# Patient Record
Sex: FEMALE | Race: WHITE | Employment: OTHER | URBAN - METROPOLITAN AREA
[De-identification: names, ages, dates, MRNs, and addresses within clinical notes are randomized per-mention and may not be internally consistent; named-entity substitution may affect disease eponyms.]

---

## 2017-01-23 ENCOUNTER — TELEPHONE (OUTPATIENT)
Dept: INTERNAL MEDICINE CLINIC | Facility: CLINIC | Age: 72
End: 2017-01-23

## 2017-01-25 ENCOUNTER — CHARTING TRANS (OUTPATIENT)
Dept: OTHER | Age: 72
End: 2017-01-25

## 2017-01-25 ASSESSMENT — PAIN SCALES - GENERAL: PAINLEVEL_OUTOF10: 2

## 2017-01-31 ENCOUNTER — OFFICE VISIT (OUTPATIENT)
Dept: INTERNAL MEDICINE CLINIC | Facility: CLINIC | Age: 72
End: 2017-01-31

## 2017-01-31 VITALS
SYSTOLIC BLOOD PRESSURE: 154 MMHG | BODY MASS INDEX: 23 KG/M2 | RESPIRATION RATE: 14 BRPM | TEMPERATURE: 99 F | HEIGHT: 62 IN | HEART RATE: 78 BPM | WEIGHT: 125 LBS | OXYGEN SATURATION: 96 % | DIASTOLIC BLOOD PRESSURE: 90 MMHG

## 2017-01-31 DIAGNOSIS — J31.0 RHINITIS, UNSPECIFIED TYPE: ICD-10-CM

## 2017-01-31 DIAGNOSIS — R03.0 ELEVATED BLOOD PRESSURE, SITUATIONAL: ICD-10-CM

## 2017-01-31 DIAGNOSIS — J06.9 VIRAL UPPER RESPIRATORY INFECTION: Primary | ICD-10-CM

## 2017-01-31 DIAGNOSIS — H69.81 EUSTACHIAN TUBE DYSFUNCTION, RIGHT: ICD-10-CM

## 2017-01-31 PROCEDURE — 99213 OFFICE O/P EST LOW 20 MIN: CPT | Performed by: FAMILY MEDICINE

## 2017-01-31 RX ORDER — PREDNISONE 20 MG/1
20 TABLET ORAL DAILY
Qty: 5 TABLET | Refills: 0 | Status: SHIPPED | OUTPATIENT
Start: 2017-01-31 | End: 2017-02-05

## 2017-01-31 NOTE — PROGRESS NOTES
CC:  URI      Hx of CC:  10 + DAYS WITH COLD SYMPTOMS. MOSTLY NASAL AND RIGHT EAR CONGESTION AT THIS TIME, INTERMITTENT FRONTAL HEADACHE WHICH RESOLVES WITH DECONGESTANT (ON [facility]). MILD RESIDUAL COUGH.     PATIENT HAS H/O SITUATIONAL

## 2017-02-06 ENCOUNTER — OFFICE VISIT (OUTPATIENT)
Dept: INTERNAL MEDICINE CLINIC | Facility: CLINIC | Age: 72
End: 2017-02-06

## 2017-02-06 VITALS
RESPIRATION RATE: 16 BRPM | BODY MASS INDEX: 22.32 KG/M2 | HEART RATE: 73 BPM | SYSTOLIC BLOOD PRESSURE: 150 MMHG | WEIGHT: 126 LBS | HEIGHT: 63 IN | DIASTOLIC BLOOD PRESSURE: 82 MMHG | TEMPERATURE: 98 F | OXYGEN SATURATION: 99 %

## 2017-02-06 DIAGNOSIS — J01.00 SUBACUTE MAXILLARY SINUSITIS: Primary | ICD-10-CM

## 2017-02-06 PROCEDURE — 99213 OFFICE O/P EST LOW 20 MIN: CPT | Performed by: INTERNAL MEDICINE

## 2017-02-06 RX ORDER — AMOXICILLIN 500 MG/1
500 CAPSULE ORAL 3 TIMES DAILY
Qty: 30 CAPSULE | Refills: 0 | Status: SHIPPED | OUTPATIENT
Start: 2017-02-06 | End: 2017-02-16

## 2017-02-06 NOTE — PROGRESS NOTES
HPI:    Patient ID: Ines Jaimes is a 80year old female. HPIpt here for fu on URI. Finished up a small course of prednisone. Mucinex  Was helpful. Review of Systems   HENT: Positive for ear pain, hearing loss and postnasal drip.     Respiratory:

## 2017-03-15 ENCOUNTER — TELEPHONE (OUTPATIENT)
Dept: INTERNAL MEDICINE CLINIC | Facility: CLINIC | Age: 72
End: 2017-03-15

## 2017-03-31 ENCOUNTER — OFFICE VISIT (OUTPATIENT)
Dept: INTERNAL MEDICINE CLINIC | Facility: CLINIC | Age: 72
End: 2017-03-31

## 2017-03-31 VITALS
TEMPERATURE: 98 F | RESPIRATION RATE: 17 BRPM | WEIGHT: 126.81 LBS | BODY MASS INDEX: 22.47 KG/M2 | HEART RATE: 83 BPM | OXYGEN SATURATION: 98 % | DIASTOLIC BLOOD PRESSURE: 68 MMHG | SYSTOLIC BLOOD PRESSURE: 122 MMHG | HEIGHT: 63 IN

## 2017-03-31 DIAGNOSIS — Z00.00 MEDICARE ANNUAL WELLNESS VISIT, SUBSEQUENT: Primary | ICD-10-CM

## 2017-03-31 PROCEDURE — 96160 PT-FOCUSED HLTH RISK ASSMT: CPT | Performed by: INTERNAL MEDICINE

## 2017-03-31 PROCEDURE — G0439 PPPS, SUBSEQ VISIT: HCPCS | Performed by: INTERNAL MEDICINE

## 2017-03-31 RX ORDER — MONTELUKAST SODIUM 10 MG/1
10 TABLET ORAL NIGHTLY
Qty: 30 TABLET | Refills: 1 | Status: SHIPPED | OUTPATIENT
Start: 2017-03-31 | End: 2018-04-11 | Stop reason: ALTCHOICE

## 2017-03-31 NOTE — PROGRESS NOTES
HPI:    Patient ID: Tameka Gross is a 80year old female. HPIPt here for a MA supervisit. Only complaints are of runny nose and congestion. Bp is well controlled off of all rx.     Review of Systems           Current Outpatient Prescriptions:  aspir without help    Managing money/bills: Able without help    Taking medications as prescribed: Able without help    Are you able to afford your medications?: Yes    Hearing Problems?: No     Functional Status     Hearing Problems?: No    Vision Problems? : N (hypertension)    • Allergic rhinitis    • Sinusitis    • Cough due to ACE inhibitor    • Seasonal allergies    • Osteopenia    • Vitamin D deficiency    • Hyperlipidemia    • Insomnia       No past surgical history on file.    Family History   Problem Rela no apparent distress  SKIN: no rashes, no suspicious lesions  HEENT: atraumatic, normocephalic, ears and throat are clear     EYES: PERRLA, EOMI, conjunctiva are clear  Right Eye Visual Acuity:  (pt declined screening)           NECK: supple, no adenopathy flowsheet data found. Glaucoma Screening      Ophthalmology Visit Annually     Bone Density Screening      Dexascan Every two years No results found for this or any previous visit. No flowsheet data found.     Pap and Pelvic      Pap  Annually if high ri No flowsheet data found. Dilated Eye exam  Annually No flowsheet data found. No flowsheet data found. COPD      Spirometry Testing Annually No results found for this or any previous visit. No flowsheet data found.       SCREENING SCHEDULE - FEMAL

## 2017-04-01 ENCOUNTER — NURSE ONLY (OUTPATIENT)
Dept: INTERNAL MEDICINE CLINIC | Facility: CLINIC | Age: 72
End: 2017-04-01

## 2017-04-01 DIAGNOSIS — Z00.00 MEDICARE ANNUAL WELLNESS VISIT, SUBSEQUENT: ICD-10-CM

## 2017-04-01 PROCEDURE — 84443 ASSAY THYROID STIM HORMONE: CPT | Performed by: INTERNAL MEDICINE

## 2017-04-01 PROCEDURE — 80061 LIPID PANEL: CPT | Performed by: INTERNAL MEDICINE

## 2017-04-01 PROCEDURE — 36415 COLL VENOUS BLD VENIPUNCTURE: CPT | Performed by: INTERNAL MEDICINE

## 2017-04-01 PROCEDURE — 85027 COMPLETE CBC AUTOMATED: CPT | Performed by: INTERNAL MEDICINE

## 2017-04-01 PROCEDURE — 80053 COMPREHEN METABOLIC PANEL: CPT | Performed by: INTERNAL MEDICINE

## 2017-04-01 NOTE — PROGRESS NOTES
Pt presented to clinic today for blood draw. Per physician able to draw orders. Orders  documented within chart. Pt tolerated lab draw well.  verified.   Orders drawn include: CBC, CMP, lipid, TSH  Site of draw: right arm  JATIN Ward

## 2017-12-20 ENCOUNTER — OFFICE VISIT (OUTPATIENT)
Dept: INTERNAL MEDICINE CLINIC | Facility: CLINIC | Age: 72
End: 2017-12-20

## 2017-12-20 VITALS
OXYGEN SATURATION: 100 % | RESPIRATION RATE: 17 BRPM | HEART RATE: 68 BPM | SYSTOLIC BLOOD PRESSURE: 118 MMHG | TEMPERATURE: 98 F | WEIGHT: 125 LBS | DIASTOLIC BLOOD PRESSURE: 76 MMHG | BODY MASS INDEX: 23.6 KG/M2 | HEIGHT: 61 IN

## 2017-12-20 DIAGNOSIS — Z23 NEEDS FLU SHOT: ICD-10-CM

## 2017-12-20 DIAGNOSIS — J01.10 SUBACUTE FRONTAL SINUSITIS: Primary | ICD-10-CM

## 2017-12-20 DIAGNOSIS — Z23 NEED FOR VACCINATION FOR STREP PNEUMONIAE: ICD-10-CM

## 2017-12-20 DIAGNOSIS — J31.0 OTHER CHRONIC RHINITIS: ICD-10-CM

## 2017-12-20 PROCEDURE — 90732 PPSV23 VACC 2 YRS+ SUBQ/IM: CPT | Performed by: INTERNAL MEDICINE

## 2017-12-20 PROCEDURE — G0008 ADMIN INFLUENZA VIRUS VAC: HCPCS | Performed by: INTERNAL MEDICINE

## 2017-12-20 PROCEDURE — G0009 ADMIN PNEUMOCOCCAL VACCINE: HCPCS | Performed by: INTERNAL MEDICINE

## 2017-12-20 PROCEDURE — 99213 OFFICE O/P EST LOW 20 MIN: CPT | Performed by: INTERNAL MEDICINE

## 2017-12-20 PROCEDURE — 90653 IIV ADJUVANT VACCINE IM: CPT | Performed by: INTERNAL MEDICINE

## 2017-12-20 RX ORDER — FLUTICASONE PROPIONATE 50 MCG
2 SPRAY, SUSPENSION (ML) NASAL DAILY
Qty: 3 BOTTLE | Refills: 3 | Status: SHIPPED | OUTPATIENT
Start: 2017-12-20 | End: 2018-04-11 | Stop reason: ALTCHOICE

## 2017-12-20 RX ORDER — AMOXICILLIN AND CLAVULANATE POTASSIUM 875; 125 MG/1; MG/1
1 TABLET, FILM COATED ORAL 2 TIMES DAILY
Qty: 14 TABLET | Refills: 0 | Status: SHIPPED | OUTPATIENT
Start: 2017-12-20 | End: 2017-12-30

## 2017-12-20 NOTE — PROGRESS NOTES
HPI:    Patient ID: Alfred Michelle is a 80year old female. HPI     Chronic sinusitis, worsen with weather, winter, fall, spring. No allergy. Nose bleed at times. Has postnasal dri and uncontrollable rhinorrhea. . Tried vaseline topically.  Symptoms to light. No scleral icterus. Neck: Normal range of motion. No thyromegaly present. Cardiovascular: Normal rate, regular rhythm, normal heart sounds and intact distal pulses.     Pulmonary/Chest: Effort normal and breath sounds normal.   Abdominal: Soft

## 2017-12-20 NOTE — PROGRESS NOTES
Vaccination administered in left arm IM  Patient tolerated well no reaction at this time, no blood at injection site band aid applied.   Vaccine given:  PCV-23  Orders per Doctor Co    Flu shot administered in left arm IM  Patient denies allergy to eggs, fl

## 2018-01-16 ENCOUNTER — HOSPITAL ENCOUNTER (EMERGENCY)
Facility: HOSPITAL | Age: 73
Discharge: HOME | End: 2018-01-16
Attending: EMERGENCY MEDICINE
Payer: MEDICARE

## 2018-01-16 VITALS
TEMPERATURE: 98 F | DIASTOLIC BLOOD PRESSURE: 88 MMHG | HEART RATE: 81 BPM | HEIGHT: 61 IN | OXYGEN SATURATION: 97 % | RESPIRATION RATE: 18 BRPM | SYSTOLIC BLOOD PRESSURE: 183 MMHG | BODY MASS INDEX: 23.6 KG/M2 | WEIGHT: 125 LBS
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
## 2018-01-16 DIAGNOSIS — S61.451A DOG BITE OF RIGHT HAND, INITIAL ENCOUNTER: Primary | ICD-10-CM

## 2018-01-16 PROCEDURE — 99283 EMERGENCY DEPT VISIT LOW MDM: CPT

## 2018-01-16 PROCEDURE — 90471 IMMUNIZATION ADMIN: CPT

## 2018-01-16 RX ORDER — AMOXICILLIN AND CLAVULANATE POTASSIUM 875; 125 MG/1; MG/1
1 TABLET, FILM COATED ORAL 2 TIMES DAILY
Qty: 14 TABLET | Refills: 0 | Status: SHIPPED | OUTPATIENT
Start: 2018-01-16 | End: 2018-01-19

## 2018-01-16 NOTE — ED INITIAL ASSESSMENT (HPI)
Pt came in for dog bite to right hand. Pt knows the dog, up to date on immunizations per pt. Last tetanus shot unknown. CMS intact, radial pulse palpable, able to complete ROM.  RR jay and nonlabored, speaking in full sentences, ambulatory with steady gait

## 2018-01-17 NOTE — ED PROVIDER NOTES
Patient Seen in: [facility] AND [facility] Emergency Department    History   Patient presents with:  Bite (integumentary)      HPI    Patient presents after being bit on her right hand by her neighbor's dog. This occurred 1 hour ago.   Patient states the dog is stairs. ROS  Pertinent Positives: Dog bite  All other organ systems are reviewed and are negative. Constitutional and vital signs reviewed.       Social History and Family History elements reviewed from today, pertinent positives to the presenting tears    Medical Record Review: I personally reviewed available prior medical records for any recent pertinent discharge summaries, testing, and procedures and reviewed those reports. Complicating Factors:  The patient already has has White matter diseas

## 2018-01-17 NOTE — ED NOTES
DOG BITE BY HER FRIEND'S DOG AT 2PM TO THE RIGHT WRIST, + PUNCTURE WOUND WITH MILD AMOUNT OF BLEEDING. WOUND CARE DONE BY PATIENT.  PATIENT FILLING OUT DOG BITE PAPERWORK, AWAITS MD EXAM

## 2018-01-19 ENCOUNTER — OFFICE VISIT (OUTPATIENT)
Dept: INTERNAL MEDICINE CLINIC | Facility: CLINIC | Age: 73
End: 2018-01-19

## 2018-01-19 VITALS
OXYGEN SATURATION: 99 % | RESPIRATION RATE: 17 BRPM | SYSTOLIC BLOOD PRESSURE: 128 MMHG | TEMPERATURE: 98 F | WEIGHT: 126 LBS | HEIGHT: 61 IN | DIASTOLIC BLOOD PRESSURE: 70 MMHG | HEART RATE: 74 BPM | BODY MASS INDEX: 23.79 KG/M2

## 2018-01-19 DIAGNOSIS — L03.113 CELLULITIS OF RIGHT HAND: ICD-10-CM
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
## 2018-01-19 PROBLEM — H40.013 OPEN ANGLE WITH BORDERLINE FINDINGS, LOW RISK, BILATERAL: Status: ACTIVE | Noted: 2017-09-29

## 2018-01-19 PROCEDURE — 99213 OFFICE O/P EST LOW 20 MIN: CPT | Performed by: INTERNAL MEDICINE

## 2018-01-19 PROCEDURE — 87205 SMEAR GRAM STAIN: CPT | Performed by: INTERNAL MEDICINE

## 2018-01-19 PROCEDURE — 87070 CULTURE OTHR SPECIMN AEROBIC: CPT | Performed by: INTERNAL MEDICINE

## 2018-01-19 RX ORDER — AMOXICILLIN AND CLAVULANATE POTASSIUM 875; 125 MG/1; MG/1
1 TABLET, FILM COATED ORAL 2 TIMES DAILY
Qty: 14 TABLET | Refills: 0 | Status: SHIPPED | OUTPATIENT
Start: 2018-01-19 | End: 2018-01-26

## 2018-01-19 NOTE — PATIENT INSTRUCTIONS
Dog Bite  A dog bite can cause a wound deep enough to break the skin. In such cases, the wound is cleaned and sometimes closed. If the wound is closed, it is usually not completely closed.  This is so that fluid can drain if the wound becomes infected. Of · If someone’s pet dog has bitten you, it should be kept in a secure area for the next 10 days to watch for signs of illness.  (If the pet owner won’t allow this, contact your local animal control center.) If the dog becomes ill or dies during that time, co Cellulitis is an infection of the deep layers of skin. A break in the skin, such as a cut or scratch, can let bacteria under the skin. If the bacteria get to deep layers of the skin, it can be serious.  If not treated, cellulitis can get into the bloodstrea Date Last Reviewed: 9/1/2016  © 2587-8378 [employer] [address]. [address] [facility], [address] [place]. All rights reserved. This information is not intended as a substitute for professional medical care.  Always follow your healthcare professional'

## 2018-01-19 NOTE — PROGRESS NOTES
HPI:    Patient ID: Jennifer Bowden is a 71year old female. HPI     R hand was bitten  by 's dog 3 days ago. Dog have complete immunization including Rabies. Seen at ED and received Tdap. Prescription of augmentin was given.  Patient had

## 2018-01-23 ENCOUNTER — OFFICE VISIT (OUTPATIENT)
Dept: OTOLARYNGOLOGY | Facility: CLINIC | Age: 73
End: 2018-01-23

## 2018-01-23 VITALS
SYSTOLIC BLOOD PRESSURE: 167 MMHG | BODY MASS INDEX: 23.79 KG/M2 | WEIGHT: 126 LBS | DIASTOLIC BLOOD PRESSURE: 87 MMHG | HEIGHT: 61 IN

## 2018-01-23 DIAGNOSIS — J32.9 CHRONIC SINUSITIS, UNSPECIFIED LOCATION: Primary | ICD-10-CM

## 2018-01-23 PROCEDURE — G0463 HOSPITAL OUTPT CLINIC VISIT: HCPCS | Performed by: OTOLARYNGOLOGY

## 2018-01-23 PROCEDURE — 99203 OFFICE O/P NEW LOW 30 MIN: CPT | Performed by: OTOLARYNGOLOGY

## 2018-01-23 NOTE — PROGRESS NOTES
Alric  is a 71year old female.   Patient presents with:  Sinus Problem: nasal congestion and clear drainage from both nostrils, and post nasal drip for several years       HISTORY OF PRESENT ILLNESS  13 year history of chronic nasal congestion and • Allergic rhinitis    • Bronchitis    • Cough due to ACE inhibitor    • HTN (hypertension)    • Hyperlipidemia    • Insomnia    • Osteopenia    • Seasonal allergies    • Sinusitis    • Vitamin D deficiency        History reviewed.  No pertinent surgical Submandibular. Anterior cervical. Posterior cervical. Supraclavicular.         Nose/Mouth/Throat Normal External nose - Normal. Lips/teeth/gums - Normal. Tonsils - Normal. Oropharynx - Normal.   Nose/Mouth/Throat Normal Nares - Right: Normal Left: Normal. S

## 2018-01-30 ENCOUNTER — HOSPITAL ENCOUNTER (OUTPATIENT)
Dept: CT IMAGING | Facility: HOSPITAL | Age: 73
Discharge: HOME | End: 2018-01-30
Attending: OTOLARYNGOLOGY
Payer: MEDICARE

## 2018-01-30 DIAGNOSIS — J32.9 CHRONIC SINUSITIS, UNSPECIFIED LOCATION: ICD-10-CM

## 2018-01-30 PROCEDURE — 70486 CT MAXILLOFACIAL W/O DYE: CPT | Performed by: OTOLARYNGOLOGY

## 2018-02-02 ENCOUNTER — TELEPHONE (OUTPATIENT)
Dept: INTERNAL MEDICINE CLINIC | Facility: CLINIC | Age: 73
End: 2018-02-02

## 2018-02-02 DIAGNOSIS — Z09 SURGICAL FOLLOWUP: Primary | ICD-10-CM

## 2018-02-08 ENCOUNTER — OFFICE VISIT (OUTPATIENT)
Dept: OTOLARYNGOLOGY | Facility: CLINIC | Age: 73
End: 2018-02-08

## 2018-02-08 VITALS
HEIGHT: 61 IN | DIASTOLIC BLOOD PRESSURE: 80 MMHG | TEMPERATURE: 99 F | SYSTOLIC BLOOD PRESSURE: 160 MMHG | BODY MASS INDEX: 23.79 KG/M2 | WEIGHT: 126 LBS

## 2018-02-08 DIAGNOSIS — J32.9 CHRONIC SINUSITIS, UNSPECIFIED LOCATION: Primary | ICD-10-CM

## 2018-02-08 PROCEDURE — 99214 OFFICE O/P EST MOD 30 MIN: CPT | Performed by: OTOLARYNGOLOGY

## 2018-02-08 PROCEDURE — G0463 HOSPITAL OUTPT CLINIC VISIT: HCPCS | Performed by: OTOLARYNGOLOGY

## 2018-02-08 RX ORDER — METHSCOPOLAMINE BROMIDE 2.5 MG/1
2.5 TABLET ORAL 2 TIMES DAILY
Qty: 60 TABLET | Refills: 3 | Status: SHIPPED | OUTPATIENT
Start: 2018-02-08 | End: 2018-04-11

## 2018-02-19 NOTE — PROGRESS NOTES
David Corey is a 71year old female. Patient presents with: Follow - Up: CT scan result done on 1/30/18      HISTORY OF PRESENT ILLNESS  13 year history of chronic nasal congestion and clear drainage from both nostrils.   Chronic postnasal drip for se Problem Relation Age of Onset   • Heart Attack Father    • Diabetes Father    • Stroke Mother 80   • Arthritis Mother    • High Blood Pressure Mother    • Musculo-skelatal Disorder Mother      osteoporosis   • Cancer Brother        Past Medical History: Oropharynx - Normal.   Ears Normal Inspection - Right: Normal, Left: Normal. Canal - Right: Normal, Left: Normal. TM - Right: Normal, Left: Normal.   Skin Normal Inspection - Normal.        Lymph Detail Normal Submental. Submandibular.  Anterior cervical. P

## 2018-02-21 ENCOUNTER — TELEPHONE (OUTPATIENT)
Dept: INTERNAL MEDICINE CLINIC | Facility: CLINIC | Age: 73
End: 2018-02-21

## 2018-04-11 ENCOUNTER — OFFICE VISIT (OUTPATIENT)
Dept: INTERNAL MEDICINE CLINIC | Facility: CLINIC | Age: 73
End: 2018-04-11

## 2018-04-11 VITALS
DIASTOLIC BLOOD PRESSURE: 66 MMHG | HEART RATE: 71 BPM | HEIGHT: 61 IN | RESPIRATION RATE: 18 BRPM | TEMPERATURE: 99 F | WEIGHT: 126 LBS | BODY MASS INDEX: 23.79 KG/M2 | SYSTOLIC BLOOD PRESSURE: 120 MMHG | OXYGEN SATURATION: 98 %

## 2018-04-11 DIAGNOSIS — J30.0 VASOMOTOR RHINITIS: ICD-10-CM

## 2018-04-11 DIAGNOSIS — Z00.00 MEDICARE ANNUAL WELLNESS VISIT, SUBSEQUENT: Primary | ICD-10-CM

## 2018-04-11 DIAGNOSIS — M85.80 OSTEOPENIA, UNSPECIFIED LOCATION: ICD-10-CM

## 2018-04-11 DIAGNOSIS — Z12.31 SCREENING MAMMOGRAM, ENCOUNTER FOR: ICD-10-CM

## 2018-04-11 DIAGNOSIS — J30.2 SEASONAL ALLERGIC RHINITIS, UNSPECIFIED TRIGGER: ICD-10-CM

## 2018-04-11 PROCEDURE — G0439 PPPS, SUBSEQ VISIT: HCPCS | Performed by: INTERNAL MEDICINE

## 2018-04-11 PROCEDURE — 96160 PT-FOCUSED HLTH RISK ASSMT: CPT | Performed by: INTERNAL MEDICINE

## 2018-04-11 NOTE — PROGRESS NOTES
HPI:    Patient ID: Karthik Aden is a 71year old female. Patient here for a MA supervisit and fu on chronic runny nose and isolated elevated bp in the past.  Saw ent and was prescribed methoscpalamine for the vasomotor rhinitis.     HPI:   Sammie Sultana WEEKS   Little interest or pleasure in doing things (over the last two weeks)?: Not at all    Feeling down, depressed, or hopeless (over the last two weeks)?: Not at all    PHQ-2 SCORE: 0        Advance Directives     Do you have a healthcare power of atto vision or double vision  HEENT: denies nasal congestion, sinus pain or ST  Chronic rhinitis  LUNGS: denies shortness of breath with exertion  CARDIOVASCULAR: denies chest pain on exertion  GI: denies abdominal pain, denies heartburn  : denies dysuria, va old female who presents for a Medicare Assessment.      PLAN SUMMARY:   Diagnoses and all orders for this visit:    Medicare annual wellness visit, subsequent    Vasomotor rhinitis    Screening mammogram, encounter for  -     [facility] SCREENING BILAT (CPT=77067) Update Health Maintenance if applicable   Immunizations      Influenza No orders found for this or any previous visit.  Update Immunization Activity if applicable    Pneumococcal   Orders placed or performed in visit on 12/20/17  -PNEUMOCOCCAL IMM (Chun [place] (H)      Colonoscopy All Patients q10 years Colonoscopy,10 Years due on 12/29/1996   FBS All Patients q1 year No results found for: GLUCOSE   Glaucoma If at high risk q1 year    Pap If at high risk q1 year There are no preventive care reminders to display unspecified location  dexa scan    No orders of the defined types were placed in this encounter.       Meds This Visit:  No prescriptions requested or ordered in this encounter    Imaging & Referrals:  [facility] SCREENING BILAT (CPT=77067)  XR DEXA BONE DENSITOME

## 2018-04-27 ENCOUNTER — HOSPITAL ENCOUNTER (OUTPATIENT)
Dept: MAMMOGRAPHY | Age: 73
Discharge: HOME | End: 2018-04-27
Attending: INTERNAL MEDICINE
Payer: MEDICARE

## 2018-04-27 DIAGNOSIS — Z12.31 SCREENING MAMMOGRAM, ENCOUNTER FOR: ICD-10-CM

## 2018-04-27 PROCEDURE — 77067 SCR MAMMO BI INCL CAD: CPT | Performed by: INTERNAL MEDICINE

## 2018-04-30 ENCOUNTER — LAB ENCOUNTER (OUTPATIENT)
Dept: LAB | Facility: HOSPITAL | Age: 73
End: 2018-04-30
Attending: INTERNAL MEDICINE
Payer: MEDICARE

## 2018-04-30 DIAGNOSIS — Z00.00 MEDICARE ANNUAL WELLNESS VISIT, SUBSEQUENT: ICD-10-CM

## 2018-04-30 PROCEDURE — 36415 COLL VENOUS BLD VENIPUNCTURE: CPT

## 2018-04-30 PROCEDURE — 82306 VITAMIN D 25 HYDROXY: CPT

## 2018-04-30 PROCEDURE — 80061 LIPID PANEL: CPT

## 2018-04-30 PROCEDURE — 85025 COMPLETE CBC W/AUTO DIFF WBC: CPT

## 2018-04-30 PROCEDURE — 80053 COMPREHEN METABOLIC PANEL: CPT

## 2018-05-02 ENCOUNTER — TELEPHONE (OUTPATIENT)
Dept: INTERNAL MEDICINE CLINIC | Facility: CLINIC | Age: 73
End: 2018-05-02

## 2018-06-13 ENCOUNTER — OFFICE VISIT (OUTPATIENT)
Dept: INTERNAL MEDICINE CLINIC | Facility: CLINIC | Age: 73
End: 2018-06-13

## 2018-06-13 VITALS
HEART RATE: 78 BPM | SYSTOLIC BLOOD PRESSURE: 140 MMHG | TEMPERATURE: 98 F | HEIGHT: 61 IN | DIASTOLIC BLOOD PRESSURE: 82 MMHG | RESPIRATION RATE: 17 BRPM | OXYGEN SATURATION: 98 % | WEIGHT: 126 LBS | BODY MASS INDEX: 23.79 KG/M2

## 2018-06-13 DIAGNOSIS — I10 ESSENTIAL HYPERTENSION: Primary | ICD-10-CM

## 2018-06-13 DIAGNOSIS — L30.9 ECZEMA, UNSPECIFIED TYPE: ICD-10-CM

## 2018-06-13 PROCEDURE — 99213 OFFICE O/P EST LOW 20 MIN: CPT | Performed by: INTERNAL MEDICINE

## 2018-06-13 RX ORDER — MOMETASONE FUROATE 1 MG/G
CREAM TOPICAL
Qty: 15 G | Refills: 3 | Status: SHIPPED | OUTPATIENT
Start: 2018-06-13 | End: 2019-05-15

## 2018-06-13 RX ORDER — HYDROCHLOROTHIAZIDE 25 MG/1
25 TABLET ORAL DAILY
Qty: 90 TABLET | Refills: 3 | Status: SHIPPED | OUTPATIENT
Start: 2018-06-13 | End: 2019-05-15

## 2018-06-13 NOTE — PROGRESS NOTES
HPI:    Patient ID: Sindy Dempsey is a 71year old female. Pt here for eval of some elevated bp readings at home kelli in the pms and evenings. Has had hx of htn in past treated with ACe. Has been prone to some fluid retention in the ankles.     Review

## 2018-11-06 VITALS
WEIGHT: 122 LBS | BODY MASS INDEX: 21.62 KG/M2 | HEART RATE: 88 BPM | RESPIRATION RATE: 16 BRPM | SYSTOLIC BLOOD PRESSURE: 150 MMHG | DIASTOLIC BLOOD PRESSURE: 80 MMHG | TEMPERATURE: 98.9 F | HEIGHT: 63 IN

## 2018-11-14 ENCOUNTER — OFFICE VISIT (OUTPATIENT)
Dept: INTERNAL MEDICINE CLINIC | Facility: CLINIC | Age: 73
End: 2018-11-14

## 2018-11-14 VITALS
OXYGEN SATURATION: 98 % | WEIGHT: 123 LBS | RESPIRATION RATE: 17 BRPM | SYSTOLIC BLOOD PRESSURE: 160 MMHG | TEMPERATURE: 99 F | BODY MASS INDEX: 23 KG/M2 | DIASTOLIC BLOOD PRESSURE: 86 MMHG | HEART RATE: 86 BPM

## 2018-11-14 DIAGNOSIS — I10 ESSENTIAL HYPERTENSION: Primary | ICD-10-CM

## 2018-11-14 DIAGNOSIS — J30.2 SEASONAL ALLERGIC RHINITIS, UNSPECIFIED TRIGGER: ICD-10-CM

## 2018-11-14 PROCEDURE — G0008 ADMIN INFLUENZA VIRUS VAC: HCPCS | Performed by: INTERNAL MEDICINE

## 2018-11-14 PROCEDURE — 90653 IIV ADJUVANT VACCINE IM: CPT | Performed by: INTERNAL MEDICINE

## 2018-11-14 PROCEDURE — 99214 OFFICE O/P EST MOD 30 MIN: CPT | Performed by: INTERNAL MEDICINE

## 2018-11-14 NOTE — PROGRESS NOTES
HPI:    Patient ID: Steve Browning is a 71year old female. Pt here for a fu on htn - claims to be getting lower back pains related to intake of Hctz . Continues to have nasal mucous problems. Has carpal tu hand. nnel symptoms in right     Review of S regular rhythm and normal heart sounds. Pulmonary/Chest: Effort normal and breath sounds normal. She has no wheezes. She has no rales. Abdominal: Soft. Bowel sounds are normal. There is no tenderness. Musculoskeletal: Normal range of motion.    Lymph

## 2019-03-18 ENCOUNTER — OFFICE VISIT (OUTPATIENT)
Dept: INTERNAL MEDICINE CLINIC | Facility: CLINIC | Age: 74
End: 2019-03-18
Payer: COMMERCIAL

## 2019-03-18 VITALS
TEMPERATURE: 98 F | WEIGHT: 124 LBS | RESPIRATION RATE: 20 BRPM | HEIGHT: 61 IN | DIASTOLIC BLOOD PRESSURE: 70 MMHG | OXYGEN SATURATION: 98 % | SYSTOLIC BLOOD PRESSURE: 126 MMHG | BODY MASS INDEX: 23.41 KG/M2 | HEART RATE: 76 BPM

## 2019-03-18 DIAGNOSIS — I10 ESSENTIAL HYPERTENSION: ICD-10-CM

## 2019-03-18 DIAGNOSIS — J01.10 ACUTE NON-RECURRENT FRONTAL SINUSITIS: Primary | ICD-10-CM

## 2019-03-18 PROCEDURE — 99213 OFFICE O/P EST LOW 20 MIN: CPT | Performed by: INTERNAL MEDICINE

## 2019-03-18 RX ORDER — AMOXICILLIN AND CLAVULANATE POTASSIUM 875; 125 MG/1; MG/1
1 TABLET, FILM COATED ORAL 2 TIMES DAILY
Qty: 14 TABLET | Refills: 0 | Status: SHIPPED | OUTPATIENT
Start: 2019-03-18 | End: 2019-03-27 | Stop reason: ALTCHOICE

## 2019-03-18 RX ORDER — FLUCONAZOLE 150 MG/1
150 TABLET ORAL ONCE
Qty: 1 TABLET | Refills: 0 | Status: SHIPPED | OUTPATIENT
Start: 2019-03-18 | End: 2019-03-18

## 2019-03-18 RX ORDER — GUAIFENESIN 600 MG
600 TABLET, EXTENDED RELEASE 12 HR ORAL 2 TIMES DAILY
Qty: 18 TABLET | Refills: 0 | Status: SHIPPED | OUTPATIENT
Start: 2019-03-18 | End: 2019-03-27 | Stop reason: ALTCHOICE

## 2019-03-18 RX ORDER — AMOXICILLIN AND CLAVULANATE POTASSIUM 875; 125 MG/1; MG/1
1 TABLET, FILM COATED ORAL 2 TIMES DAILY
Qty: 14 TABLET | Refills: 0 | Status: SHIPPED | OUTPATIENT
Start: 2019-03-18 | End: 2019-03-18

## 2019-03-18 RX ORDER — GUAIFENESIN 600 MG
600 TABLET, EXTENDED RELEASE 12 HR ORAL 2 TIMES DAILY
Qty: 18 TABLET | Refills: 0 | Status: SHIPPED | OUTPATIENT
Start: 2019-03-18 | End: 2019-03-18

## 2019-03-18 NOTE — PROGRESS NOTES
HPI:    Patient ID: David Corey is a 68year old female. Sinusitis   This is a new problem. The current episode started in the past 7 days. The problem has been gradually worsening since onset. There has been no fever. The pain is moderate.  Associat Oropharynx is clear and moist.   Swelling of nasal turbinates with yellow  Thick mucus. Post nasal drip. Frontal sinus tenderness   Eyes: Conjunctivae and EOM are normal. Pupils are equal, round, and reactive to light. No scleral icterus.    Neck: Normal ran

## 2019-03-27 ENCOUNTER — OFFICE VISIT (OUTPATIENT)
Dept: INTERNAL MEDICINE CLINIC | Facility: CLINIC | Age: 74
End: 2019-03-27
Payer: COMMERCIAL

## 2019-03-27 VITALS
BODY MASS INDEX: 23.79 KG/M2 | OXYGEN SATURATION: 98 % | WEIGHT: 126 LBS | DIASTOLIC BLOOD PRESSURE: 80 MMHG | HEIGHT: 61 IN | HEART RATE: 82 BPM | SYSTOLIC BLOOD PRESSURE: 134 MMHG

## 2019-03-27 DIAGNOSIS — J01.10 ACUTE NON-RECURRENT FRONTAL SINUSITIS: Primary | ICD-10-CM

## 2019-03-27 PROCEDURE — 99213 OFFICE O/P EST LOW 20 MIN: CPT | Performed by: INTERNAL MEDICINE

## 2019-03-27 RX ORDER — METHYLPREDNISOLONE 4 MG/1
TABLET ORAL
Qty: 1 KIT | Refills: 0 | Status: SHIPPED | OUTPATIENT
Start: 2019-03-27 | End: 2019-05-15 | Stop reason: ALTCHOICE

## 2019-03-27 NOTE — PROGRESS NOTES
HPI:    Patient ID: Slime Reese is a 68year old female. Pt here for fu on sinus congestion and thick post nasal drip- took 7 days of augmentin -finished 2 days ago. Has partially improved but does not feel all the way cured.  Has been doing sinus ri

## 2019-05-15 ENCOUNTER — OFFICE VISIT (OUTPATIENT)
Dept: INTERNAL MEDICINE CLINIC | Facility: CLINIC | Age: 74
End: 2019-05-15
Payer: MEDICARE

## 2019-05-15 VITALS
WEIGHT: 123.63 LBS | OXYGEN SATURATION: 99 % | SYSTOLIC BLOOD PRESSURE: 138 MMHG | HEIGHT: 61 IN | HEART RATE: 95 BPM | DIASTOLIC BLOOD PRESSURE: 78 MMHG | BODY MASS INDEX: 23.34 KG/M2

## 2019-05-15 DIAGNOSIS — Z00.00 MEDICARE ANNUAL WELLNESS VISIT, SUBSEQUENT: Primary | ICD-10-CM

## 2019-05-15 DIAGNOSIS — Z12.31 SCREENING MAMMOGRAM, ENCOUNTER FOR: ICD-10-CM

## 2019-05-15 DIAGNOSIS — J30.2 SEASONAL ALLERGIC RHINITIS, UNSPECIFIED TRIGGER: ICD-10-CM

## 2019-05-15 DIAGNOSIS — M54.12 CERVICAL RADICULOPATHY: ICD-10-CM

## 2019-05-15 DIAGNOSIS — I10 ESSENTIAL HYPERTENSION: ICD-10-CM

## 2019-05-15 PROCEDURE — 80053 COMPREHEN METABOLIC PANEL: CPT | Performed by: INTERNAL MEDICINE

## 2019-05-15 PROCEDURE — 80061 LIPID PANEL: CPT | Performed by: INTERNAL MEDICINE

## 2019-05-15 PROCEDURE — 84443 ASSAY THYROID STIM HORMONE: CPT | Performed by: INTERNAL MEDICINE

## 2019-05-15 PROCEDURE — 96160 PT-FOCUSED HLTH RISK ASSMT: CPT | Performed by: INTERNAL MEDICINE

## 2019-05-15 PROCEDURE — 84439 ASSAY OF FREE THYROXINE: CPT | Performed by: INTERNAL MEDICINE

## 2019-05-15 PROCEDURE — G0439 PPPS, SUBSEQ VISIT: HCPCS | Performed by: INTERNAL MEDICINE

## 2019-05-15 PROCEDURE — 99397 PER PM REEVAL EST PAT 65+ YR: CPT | Performed by: INTERNAL MEDICINE

## 2019-05-15 PROCEDURE — 85025 COMPLETE CBC W/AUTO DIFF WBC: CPT | Performed by: INTERNAL MEDICINE

## 2019-05-15 RX ORDER — HYDROCHLOROTHIAZIDE 25 MG/1
25 TABLET ORAL DAILY
Qty: 90 TABLET | Refills: 3 | Status: SHIPPED | OUTPATIENT
Start: 2019-05-15 | End: 2020-05-28

## 2019-05-15 NOTE — PROGRESS NOTES
HPI:    Patient ID: Tameka Gross is a 68year old female. Pt here for a MA supervisit and fu on mild htn and fu on tingling in the hands. Has been seeing an accupuncture practioner.     HPI:   Tameka Gross is a 68year old female who presents for (PHQ-2/PHQ-9): Over the LAST 2 WEEKS                      Advance Directives     Do you have a healthcare power of ?: Yes    Do you have a living will?: Yes   Was Medicare Assessment Questionnaire completed by patient and sent to HIM for scanning? Brien Carrasco congestion, sinus pain or ST post nasal drip  LUNGS: denies shortness of breath with exertion  CARDIOVASCULAR: denies chest pain on exertion  GI: denies abdominal pain, denies heartburn  : denies dysuria, vaginal discharge or itching, no complaint of uri these issues and agrees to the plan.   The patient is asked to return in 6m for fu       [address] Internal Lab or Procedure External Lab or Procedure   Diabetes Screening      HbgA1C   Annually No results found for: A1 Immunization Activity if applicable    Tetanus No orders found for this or any previous visit.  Update Immunization Activity if applicable        SPECIFIC DISEASE MONITORING Internal Lab or Procedure External Lab or Procedure   Annual Monitoring of Persiste at initial exam    Vaccines:      Pneumococcal All Patients Once per lifetime Orders placed or performed in visit on 12/20/17   • PNEUMOCOCCAL IMM (PNEUMOVAX)   Orders placed or performed in visit on 10/11/16   • PNEUMOCOCCAL VACC, 13 RASHEEDA IM      Influenza

## 2019-05-22 ENCOUNTER — TELEPHONE (OUTPATIENT)
Dept: INTERNAL MEDICINE CLINIC | Facility: CLINIC | Age: 74
End: 2019-05-22

## 2019-05-23 ENCOUNTER — HOSPITAL ENCOUNTER (OUTPATIENT)
Dept: MAMMOGRAPHY | Age: 74
Discharge: HOME | End: 2019-05-23
Attending: INTERNAL MEDICINE
Payer: MEDICARE

## 2019-05-23 DIAGNOSIS — Z12.31 SCREENING MAMMOGRAM, ENCOUNTER FOR: ICD-10-CM

## 2019-05-23 PROCEDURE — 77067 SCR MAMMO BI INCL CAD: CPT | Performed by: INTERNAL MEDICINE

## 2019-05-23 PROCEDURE — 77063 BREAST TOMOSYNTHESIS BI: CPT | Performed by: INTERNAL MEDICINE

## 2019-05-28 ENCOUNTER — TELEPHONE (OUTPATIENT)
Dept: INTERNAL MEDICINE CLINIC | Facility: CLINIC | Age: 74
End: 2019-05-28

## 2019-06-03 ENCOUNTER — TELEPHONE (OUTPATIENT)
Dept: INTERNAL MEDICINE CLINIC | Facility: CLINIC | Age: 74
End: 2019-06-03

## 2019-06-03 NOTE — TELEPHONE ENCOUNTER
[employer] called and requested that we fax over a referral for this patient for Physical Therapy. There is a referral in the system and it is  authorized. Referral faxed over to [facility] @ Fax #[phone].

## 2019-07-01 ENCOUNTER — OFFICE VISIT (OUTPATIENT)
Dept: PHYSICAL THERAPY | Age: 74
End: 2019-07-01
Attending: INTERNAL MEDICINE
Payer: MEDICARE

## 2019-07-01 DIAGNOSIS — M54.12 CERVICAL RADICULOPATHY: ICD-10-CM

## 2019-07-01 PROCEDURE — 97162 PT EVAL MOD COMPLEX 30 MIN: CPT

## 2019-07-01 PROCEDURE — 97110 THERAPEUTIC EXERCISES: CPT

## 2019-07-01 NOTE — PROGRESS NOTES
CERVICAL SPINE EVALUATION:   Referring Physician: Kervin Yang MD    Date of Onset: 2017 Date of Service: 7/1/19   Cervical radiculopathy (M54.12)   SUBJECTIVE:   PATIENT SUMMARY:  Mary Flores is a 68year old y/o female who presents to therapy t evolving symptoms including changing pain levels. Gordo Gallegos would benefit from skilled Physical Therapy to address the above impairments to improve functional mobility.      Precautions: None       OBJECTIVE:   Observation/Posture: fwd head    Cervical AROM for 2x/week or a total of 8 visits over a 90 day period. Treatment will include: Manual Therapy, Neuromuscular Re-education, Therapeutic Activities, Therapeutic Exercise and Home Exercise Program instruction.      Education or treatment limitation: None  Re

## 2019-07-03 ENCOUNTER — OFFICE VISIT (OUTPATIENT)
Dept: PHYSICAL THERAPY | Age: 74
End: 2019-07-03
Attending: INTERNAL MEDICINE
Payer: MEDICARE

## 2019-07-03 PROCEDURE — 97140 MANUAL THERAPY 1/> REGIONS: CPT

## 2019-07-03 PROCEDURE — 97110 THERAPEUTIC EXERCISES: CPT

## 2019-07-03 NOTE — PROGRESS NOTES
Diagnosis: Cervical radiculopathy (M54.12)  Authorized # of Visits:  8 POC [employer]         Next MD visit: none scheduled  Referring physician: Gregor Gastelum  Fall Risk: standard         Precautions:   Medication Changes since last visit?: No  FOTO: init

## 2019-07-09 ENCOUNTER — OFFICE VISIT (OUTPATIENT)
Dept: PHYSICAL THERAPY | Age: 74
End: 2019-07-09
Attending: INTERNAL MEDICINE
Payer: MEDICARE

## 2019-07-09 PROCEDURE — 97110 THERAPEUTIC EXERCISES: CPT

## 2019-07-09 NOTE — PROGRESS NOTES
Diagnosis: Cervical radiculopathy (M54.12)  Authorized # of Visits:  8 POC [employer]         Next MD visit: none scheduled  Referring physician: Cristobal Davies  Fall Risk: standard         Precautions:   Medication Changes since last visit?: No  FOTO: init therapy outcome and self manage symptoms. 2. Pt will report sleeping through the night without wakening due to symptoms in hands.   3. Pt will report decrease in hand symptoms in the morning <3/10 to be able to prepare breakfast.   4. Pt will be able to to

## 2019-07-11 ENCOUNTER — OFFICE VISIT (OUTPATIENT)
Dept: PHYSICAL THERAPY | Age: 74
End: 2019-07-11
Attending: INTERNAL MEDICINE
Payer: MEDICARE

## 2019-07-11 PROCEDURE — 97140 MANUAL THERAPY 1/> REGIONS: CPT

## 2019-07-11 PROCEDURE — 97110 THERAPEUTIC EXERCISES: CPT

## 2019-07-11 NOTE — PROGRESS NOTES
Diagnosis: Cervical radiculopathy (M54.12)  Authorized # of Visits:  8 POC [employer]         Next MD visit: none scheduled  Referring physician: Tara Yoon  Fall Risk: standard         Precautions:   Medication Changes since last visit?: No  FOTO: init

## 2019-07-16 ENCOUNTER — OFFICE VISIT (OUTPATIENT)
Dept: PHYSICAL THERAPY | Age: 74
End: 2019-07-16
Attending: INTERNAL MEDICINE
Payer: MEDICARE

## 2019-07-16 PROCEDURE — 97110 THERAPEUTIC EXERCISES: CPT

## 2019-07-16 PROCEDURE — 97140 MANUAL THERAPY 1/> REGIONS: CPT

## 2019-07-16 NOTE — PROGRESS NOTES
Diagnosis: Cervical radiculopathy (M54.12)  Authorized # of Visits:  8 POC [employer]         Next MD visit: none scheduled  Referring physician: aTra Yoon  Fall Risk: standard         Precautions:   Medication Changes since last visit?: No  FOTO: init

## 2019-07-18 ENCOUNTER — OFFICE VISIT (OUTPATIENT)
Dept: PHYSICAL THERAPY | Age: 74
End: 2019-07-18
Attending: INTERNAL MEDICINE
Payer: MEDICARE

## 2019-07-18 PROCEDURE — 97110 THERAPEUTIC EXERCISES: CPT

## 2019-07-18 PROCEDURE — 97140 MANUAL THERAPY 1/> REGIONS: CPT

## 2019-07-18 NOTE — PROGRESS NOTES
Diagnosis: Cervical radiculopathy (M54.12)  Authorized # of Visits:  8 POC [employer]         Next MD visit: none scheduled  Referring physician: Cecilia Douglas  Fall Risk: standard         Precautions:   Medication Changes since last visit?: No  FOTO: init

## 2019-07-23 ENCOUNTER — OFFICE VISIT (OUTPATIENT)
Dept: PHYSICAL THERAPY | Age: 74
End: 2019-07-23
Attending: INTERNAL MEDICINE
Payer: MEDICARE

## 2019-07-23 PROCEDURE — 97140 MANUAL THERAPY 1/> REGIONS: CPT

## 2019-07-23 PROCEDURE — 97110 THERAPEUTIC EXERCISES: CPT

## 2019-07-23 NOTE — PROGRESS NOTES
Diagnosis: Cervical radiculopathy (M54.12)  Authorized # of Visits:  8 POC [employer]         Next MD visit: none scheduled  Referring physician: Virgen Coppola  Fall Risk: standard         Precautions:   Medication Changes since last visit?: No  FOTO: init MET  2. Pt will report sleeping through the night without wakening due to symptoms in hands.- NOT MET, pt reports waking up only once per night, initially it was 3x/night  3.  Pt will report decrease in hand symptoms in the morning <3/10 to be able to prepa

## 2019-07-25 ENCOUNTER — OFFICE VISIT (OUTPATIENT)
Dept: PHYSICAL THERAPY | Age: 74
End: 2019-07-25
Attending: INTERNAL MEDICINE
Payer: MEDICARE

## 2019-07-25 PROCEDURE — 97110 THERAPEUTIC EXERCISES: CPT

## 2019-07-25 NOTE — PROGRESS NOTES
Diagnosis: Cervical radiculopathy (M54.12)  Authorized # of Visits:  8 POC [employer]         Next MD visit: none scheduled  Referring physician: Mehran Comer  Fall Risk: standard         Precautions:   Medication Changes since last visit?: No  FOTO: init minimally restricted cervical ROM, good shoulder strength, and improving scapular strength, decreased neural tension. Pt has met 1/4 LTGs. Pt has been educated and provided with HEP. Discharge pt to HEP at this time. Goals:   1.  Pt will be I with HEP

## 2019-07-25 NOTE — PROGRESS NOTES
Patient Name: Karthik Aden, : 1945, MRN: [id number]   Date:  2019  Referring Physician:  No ref. provider found    Diagnosis: No diagnosis found. Discharge Summary    Pt has attended 8, cancelled 0 visits in Physical Therapy.      Pro able to prepare breakfast.- NOT MET  4.  Pt will be able to tolerate driving >15 min without symptoms in hands. - NOT MET    FOTO: 87/100        Plan: D/C pt from PT     Patient/Family/Caregiver was advised of these findings, precautions, and treatment opti

## 2019-07-31 ENCOUNTER — TELEPHONE (OUTPATIENT)
Dept: INTERNAL MEDICINE CLINIC | Facility: CLINIC | Age: 74
End: 2019-07-31

## 2019-08-08 ENCOUNTER — TELEPHONE (OUTPATIENT)
Dept: INTERNAL MEDICINE CLINIC | Facility: CLINIC | Age: 74
End: 2019-08-08

## 2019-08-08 NOTE — TELEPHONE ENCOUNTER
LVM:  MRI: cervical spine authorized. The Authorization # is [id number] effective from 08/07/19 - 11/05/19 for CPT 33223.      The patient can schedule at their convenience on or before 11/05/19.

## 2019-08-22 ENCOUNTER — HOSPITAL ENCOUNTER (OUTPATIENT)
Dept: MRI IMAGING | Age: 74
Discharge: HOME | End: 2019-08-22
Attending: INTERNAL MEDICINE
Payer: MEDICARE

## 2019-08-22 DIAGNOSIS — M54.12 CERVICAL RADICULOPATHY: ICD-10-CM

## 2019-08-22 PROCEDURE — 72141 MRI NECK SPINE W/O DYE: CPT | Performed by: INTERNAL MEDICINE

## 2019-10-02 ENCOUNTER — OFFICE VISIT (OUTPATIENT)
Dept: NEUROLOGY | Facility: CLINIC | Age: 74
End: 2019-10-02
Payer: MEDICARE

## 2019-10-02 VITALS
BODY MASS INDEX: 23 KG/M2 | HEART RATE: 88 BPM | HEIGHT: 62 IN | RESPIRATION RATE: 16 BRPM | DIASTOLIC BLOOD PRESSURE: 66 MMHG | WEIGHT: 125 LBS | SYSTOLIC BLOOD PRESSURE: 150 MMHG

## 2019-10-02 DIAGNOSIS — R20.0 NUMBNESS AND TINGLING: Primary | ICD-10-CM

## 2019-10-02 DIAGNOSIS — R20.2 NUMBNESS AND TINGLING: Primary | ICD-10-CM

## 2019-10-02 PROCEDURE — 99203 OFFICE O/P NEW LOW 30 MIN: CPT | Performed by: PHYSICAL MEDICINE & REHABILITATION

## 2019-10-02 NOTE — PROGRESS NOTES
[address]  Progress Note    CHIEF COMPLAINT:  Patient presents with:  Hand Pain: Patient presents for bilateral hand pain, referred by Sina Byrd.  Was told the pain, numbness and tingling was caused from her Cream Apply topically 2 (two) times daily as needed. 45 g 3   • hydrochlorothiazide 25 MG Oral Tab Take 1 tablet (25 mg total) by mouth daily. 90 tablet 3   • aspirin 81 MG Oral Tab Take 81 mg by mouth daily.      • gabapentin 100 MG Oral Cap Take 1 capsule respirations  Extremities: No upper extremity edema bilaterally   Skin: No lesions noted.    Spine:  full and painfree cervical ROM in all directions  Shoulders: full and painfree ROM   Neuro:   Cognition: alert & oriented x 3, attentive, able to follow 2 s

## 2019-10-03 ENCOUNTER — MED REC SCAN ONLY (OUTPATIENT)
Dept: NEUROLOGY | Facility: CLINIC | Age: 74
End: 2019-10-03

## 2019-11-27 ENCOUNTER — OFFICE VISIT (OUTPATIENT)
Dept: INTERNAL MEDICINE CLINIC | Facility: CLINIC | Age: 74
End: 2019-11-27
Payer: MEDICARE

## 2019-11-27 VITALS
HEIGHT: 61 IN | WEIGHT: 117 LBS | OXYGEN SATURATION: 98 % | HEART RATE: 111 BPM | DIASTOLIC BLOOD PRESSURE: 88 MMHG | BODY MASS INDEX: 22.09 KG/M2 | SYSTOLIC BLOOD PRESSURE: 132 MMHG

## 2019-11-27 DIAGNOSIS — M54.12 CERVICAL RADICULOPATHY: ICD-10-CM

## 2019-11-27 DIAGNOSIS — J30.2 SEASONAL ALLERGIC RHINITIS, UNSPECIFIED TRIGGER: ICD-10-CM

## 2019-11-27 DIAGNOSIS — Z23 NEED FOR INFLUENZA VACCINATION: ICD-10-CM

## 2019-11-27 DIAGNOSIS — M54.17 LUMBOSACRAL RADICULOPATHY: Primary | ICD-10-CM

## 2019-11-27 PROCEDURE — 90662 IIV NO PRSV INCREASED AG IM: CPT | Performed by: INTERNAL MEDICINE

## 2019-11-27 PROCEDURE — G0008 ADMIN INFLUENZA VIRUS VAC: HCPCS | Performed by: INTERNAL MEDICINE

## 2019-11-27 PROCEDURE — 99214 OFFICE O/P EST MOD 30 MIN: CPT | Performed by: INTERNAL MEDICINE

## 2019-11-27 RX ORDER — GABAPENTIN 100 MG/1
100 CAPSULE ORAL 2 TIMES DAILY
Qty: 60 CAPSULE | Refills: 2 | Status: SHIPPED | OUTPATIENT
Start: 2019-11-27 | End: 2019-12-23 | Stop reason: DRUGHIGH

## 2019-11-27 NOTE — PROGRESS NOTES
HPI:    Patient ID: Jarrod Crawford is a 68year old female. HPI Pt here for fu on MRI of c spine which shows severe spinal stenosis - pt has had little improvement with PT. Does not want to consider surgical intervention.   Also having some chronic sin

## 2019-11-30 PROBLEM — R20.2 NUMBNESS AND TINGLING: Status: ACTIVE | Noted: 2019-11-30

## 2019-11-30 PROBLEM — R20.0 NUMBNESS AND TINGLING: Status: ACTIVE | Noted: 2019-11-30

## 2019-12-23 ENCOUNTER — TELEPHONE (OUTPATIENT)
Dept: INTERNAL MEDICINE CLINIC | Facility: CLINIC | Age: 74
End: 2019-12-23

## 2019-12-26 NOTE — TELEPHONE ENCOUNTER
Fax receieved from pharmacy, Gabapentin 100mg only covers 3 per day. Please advise.   Current Rx is for   gabapentin 100 MG Oral Cap  2 caps bid, Normal, Disp-120 capsule, R-3  Last Dose:Not Recorded

## 2020-01-13 ENCOUNTER — TELEPHONE (OUTPATIENT)
Dept: INTERNAL MEDICINE CLINIC | Facility: CLINIC | Age: 75
End: 2020-01-13

## 2020-01-13 RX ORDER — GABAPENTIN 100 MG/1
CAPSULE ORAL
Qty: 120 CAPSULE | Refills: 3 | OUTPATIENT
Start: 2020-01-13

## 2020-01-14 ENCOUNTER — TELEPHONE (OUTPATIENT)
Dept: INTERNAL MEDICINE CLINIC | Facility: CLINIC | Age: 75
End: 2020-01-14

## 2020-01-14 NOTE — TELEPHONE ENCOUNTER
Pt called back because she hasn't heard from anyone about her refill request.  I let the pt know about the 3 refills with 120 capsules, and she is currently taking 4 pills a day.   Pt stating that she only received 84 capsules, and the pharmacy told her kwaku

## 2020-03-04 ENCOUNTER — TELEPHONE (OUTPATIENT)
Dept: INTERNAL MEDICINE CLINIC | Facility: CLINIC | Age: 75
End: 2020-03-04

## 2020-03-04 NOTE — TELEPHONE ENCOUNTER
Refill for  triamcinolone acetonide 0.1 % External Cream 45 g 3     Send to   [facility] [address] \[facility]\"" [address], [address], [phone]

## 2020-04-13 ENCOUNTER — TELEPHONE (OUTPATIENT)
Dept: INTERNAL MEDICINE CLINIC | Facility: CLINIC | Age: 75
End: 2020-04-13

## 2020-04-13 DIAGNOSIS — M54.17 LUMBOSACRAL RADICULOPATHY: Primary | ICD-10-CM

## 2020-05-07 ENCOUNTER — TELEPHONE (OUTPATIENT)
Dept: INTERNAL MEDICINE CLINIC | Facility: CLINIC | Age: 75
End: 2020-05-07

## 2020-05-07 DIAGNOSIS — M54.17 LUMBOSACRAL RADICULOPATHY: Primary | ICD-10-CM

## 2020-05-07 NOTE — TELEPHONE ENCOUNTER
Patient states she is still in a lot of pain and would like to take something alil bit stronger then what she's taking

## 2020-05-28 RX ORDER — HYDROCHLOROTHIAZIDE 25 MG/1
TABLET ORAL
Qty: 90 TABLET | Refills: 0 | Status: SHIPPED | OUTPATIENT
Start: 2020-05-28 | End: 2020-07-27

## 2020-07-27 RX ORDER — HYDROCHLOROTHIAZIDE 25 MG/1
TABLET ORAL
Qty: 90 TABLET | Refills: 0 | Status: SHIPPED | OUTPATIENT
Start: 2020-07-27 | End: 2020-08-19

## 2020-08-19 ENCOUNTER — OFFICE VISIT (OUTPATIENT)
Dept: INTERNAL MEDICINE CLINIC | Facility: CLINIC | Age: 75
End: 2020-08-19
Payer: MEDICARE

## 2020-08-19 VITALS
WEIGHT: 122.19 LBS | BODY MASS INDEX: 23.07 KG/M2 | SYSTOLIC BLOOD PRESSURE: 152 MMHG | TEMPERATURE: 98 F | HEIGHT: 61 IN | DIASTOLIC BLOOD PRESSURE: 96 MMHG

## 2020-08-19 DIAGNOSIS — M54.12 CERVICAL RADICULOPATHY: ICD-10-CM

## 2020-08-19 DIAGNOSIS — M54.17 LUMBOSACRAL RADICULOPATHY: ICD-10-CM

## 2020-08-19 DIAGNOSIS — Z00.00 MEDICARE ANNUAL WELLNESS VISIT, SUBSEQUENT: Primary | ICD-10-CM

## 2020-08-19 DIAGNOSIS — I10 ESSENTIAL HYPERTENSION: ICD-10-CM

## 2020-08-19 DIAGNOSIS — Z12.31 SCREENING MAMMOGRAM, ENCOUNTER FOR: ICD-10-CM

## 2020-08-19 PROCEDURE — 3080F DIAST BP >= 90 MM HG: CPT | Performed by: INTERNAL MEDICINE

## 2020-08-19 PROCEDURE — 3008F BODY MASS INDEX DOCD: CPT | Performed by: INTERNAL MEDICINE

## 2020-08-19 PROCEDURE — 3077F SYST BP >= 140 MM HG: CPT | Performed by: INTERNAL MEDICINE

## 2020-08-19 PROCEDURE — 96160 PT-FOCUSED HLTH RISK ASSMT: CPT | Performed by: INTERNAL MEDICINE

## 2020-08-19 PROCEDURE — 99397 PER PM REEVAL EST PAT 65+ YR: CPT | Performed by: INTERNAL MEDICINE

## 2020-08-19 PROCEDURE — G0439 PPPS, SUBSEQ VISIT: HCPCS | Performed by: INTERNAL MEDICINE

## 2020-08-19 RX ORDER — EUCALYPTUS/PEPPERMINT OIL
1 SOLUTION, NON-ORAL NASAL 2 TIMES DAILY
Qty: 30 ML | Refills: 2 | Status: SHIPPED | OUTPATIENT
Start: 2020-08-19 | End: 2020-11-05

## 2020-08-19 RX ORDER — GABAPENTIN 300 MG/1
300 CAPSULE ORAL 3 TIMES DAILY
Qty: 90 CAPSULE | Refills: 5 | Status: SHIPPED | OUTPATIENT
Start: 2020-08-19 | End: 2020-12-02

## 2020-08-19 RX ORDER — HYDROCHLOROTHIAZIDE 25 MG/1
25 TABLET ORAL DAILY
Qty: 90 TABLET | Refills: 3 | Status: SHIPPED | OUTPATIENT
Start: 2020-08-19 | End: 2021-11-16

## 2020-08-19 NOTE — PROGRESS NOTES
HPI:    Patient ID: Bernard Hunter is a 69year old female. HPIPt here for a MA supervisit and fu on htn and cervical radiculopathy. HPI:   Bernard Hunter is a 69year old female who presents for a MA Supervisit.     Patient Active Problem List: Problem Relation Age of Onset   • Heart Attack Father    • Diabetes Father    • Stroke Mother 80   • Arthritis Mother    • High Blood Pressure Mother    • Musculo-skelatal Disorder Mother         osteoporosis   • Cancer Brother       SOCIAL HISTORY:   So chest tenderness  BREAST: no masses, no discharge or no axillary pendulouss lymphadenopathy   LUNGS: clear to auscultation  CARDIO: RRR without murmur  GI: good BS's, no masses, HSM or tenderness  : deferred  RECTAL: deferred  MUSCULOSKELETAL: back is no Update Health Maintenance if applicable    Pap  Every two years There are no preventive care reminders to display for this patient.  Update Health Maintenance if applicable    Chlamydia  Annually if high risk No results found for: CHLAMYDIA No flowsheet laureano No flowsheet data found.       SCREENING SCHEDULE - FEMALE      SCREEN COVERAGE SCHEDULE  (If Indicated) LAST DONE   Dexa If at Risk q2 years    Lipids All Patients q5 years LDL Cholesterol (mg/dL)   Date Value   05/15/2019 97      Colonoscopy All Patients Cap Take 1 capsule (300 mg total) by mouth 3 (three) times daily. 90 capsule 5   • triamcinolone acetonide 0.1 % External Cream Apply topically 2 (two) times daily as needed. 45 g 3   • aspirin 81 MG Oral Tab Take 81 mg by mouth daily.        Allergies:No K

## 2020-08-20 LAB
ABSOLUTE BASOPHILS: 122 CELLS/UL (ref 0–200)
ABSOLUTE EOSINOPHILS: 426 CELLS/UL (ref 15–500)
ABSOLUTE LYMPHOCYTES: 2810 CELLS/UL (ref 850–3900)
ABSOLUTE MONOCYTES: 574 CELLS/UL (ref 200–950)
ABSOLUTE NEUTROPHILS: 4768 CELLS/UL (ref 1500–7800)
ALBUMIN/GLOBULIN RATIO: 1.4 (CALC) (ref 1–2.5)
ALBUMIN: 4.6 G/DL (ref 3.6–5.1)
ALKALINE PHOSPHATASE: 69 U/L (ref 37–153)
ALT: 20 U/L (ref 6–29)
AST: 22 U/L (ref 10–35)
BASOPHILS: 1.4 %
BILIRUBIN, TOTAL: 0.9 MG/DL (ref 0.2–1.2)
BUN: 14 MG/DL (ref 7–25)
CALCIUM: 10.3 MG/DL (ref 8.6–10.4)
CARBON DIOXIDE: 28 MMOL/L (ref 20–32)
CHLORIDE: 100 MMOL/L (ref 98–110)
CHOL/HDLC RATIO: 2.9 (CALC)
CHOLESTEROL, TOTAL: 203 MG/DL
CREATININE: 0.69 MG/DL (ref 0.6–0.93)
EGFR IF AFRICN AM: 100 ML/MIN/1.73M2
EGFR IF NONAFRICN AM: 86 ML/MIN/1.73M2
EOSINOPHILS: 4.9 %
GLOBULIN: 3.2 G/DL (CALC) (ref 1.9–3.7)
GLUCOSE: 82 MG/DL (ref 65–99)
HDL CHOLESTEROL: 71 MG/DL
HEMATOCRIT: 35.8 % (ref 35–45)
HEMOGLOBIN: 12.4 G/DL (ref 11.7–15.5)
LDL-CHOLESTEROL: 117 MG/DL (CALC)
LYMPHOCYTES: 32.3 %
MCH: 32.8 PG (ref 27–33)
MCHC: 34.6 G/DL (ref 32–36)
MCV: 94.7 FL (ref 80–100)
MONOCYTES: 6.6 %
MPV: 10.7 FL (ref 7.5–12.5)
NEUTROPHILS: 54.8 %
NON-HDL CHOLESTEROL: 132 MG/DL (CALC)
PLATELET COUNT: 310 THOUSAND/UL (ref 140–400)
POTASSIUM: 3.8 MMOL/L (ref 3.5–5.3)
PROTEIN, TOTAL: 7.8 G/DL (ref 6.1–8.1)
RDW: 12.4 % (ref 11–15)
RED BLOOD CELL COUNT: 3.78 MILLION/UL (ref 3.8–5.1)
SODIUM: 137 MMOL/L (ref 135–146)
TRIGLYCERIDES: 56 MG/DL
WHITE BLOOD CELL COUNT: 8.7 THOUSAND/UL (ref 3.8–10.8)

## 2020-08-31 ENCOUNTER — TELEPHONE (OUTPATIENT)
Dept: INTERNAL MEDICINE CLINIC | Facility: CLINIC | Age: 75
End: 2020-08-31

## 2020-08-31 NOTE — TELEPHONE ENCOUNTER
Phone line went to fast busy.     Sent approval via [employer]    MRI Lumbar Spine  [employer] / [facility]  AUTH# [id number]  E/D 08/28/20 - 02/24/2021  CPT 62114

## 2020-09-08 ENCOUNTER — HOSPITAL ENCOUNTER (OUTPATIENT)
Dept: MRI IMAGING | Age: 75
Discharge: HOME | End: 2020-09-08
Attending: INTERNAL MEDICINE
Payer: MEDICARE

## 2020-09-08 DIAGNOSIS — M54.17 LUMBOSACRAL RADICULOPATHY: ICD-10-CM

## 2020-09-08 PROCEDURE — 72148 MRI LUMBAR SPINE W/O DYE: CPT | Performed by: INTERNAL MEDICINE

## 2020-09-09 ENCOUNTER — HOSPITAL ENCOUNTER (OUTPATIENT)
Dept: MAMMOGRAPHY | Age: 75
Discharge: HOME | End: 2020-09-09
Attending: INTERNAL MEDICINE
Payer: MEDICARE

## 2020-09-09 DIAGNOSIS — Z12.31 SCREENING MAMMOGRAM, ENCOUNTER FOR: ICD-10-CM

## 2020-09-09 PROCEDURE — 77067 SCR MAMMO BI INCL CAD: CPT | Performed by: INTERNAL MEDICINE

## 2020-09-09 PROCEDURE — 77063 BREAST TOMOSYNTHESIS BI: CPT | Performed by: INTERNAL MEDICINE

## 2020-09-28 ENCOUNTER — TELEPHONE (OUTPATIENT)
Dept: INTERNAL MEDICINE CLINIC | Facility: CLINIC | Age: 75
End: 2020-09-28

## 2020-09-28 DIAGNOSIS — M48.02 CERVICAL STENOSIS OF SPINAL CANAL: Primary | ICD-10-CM

## 2020-09-28 DIAGNOSIS — M54.12 CERVICAL RADICULOPATHY: Primary | ICD-10-CM

## 2020-09-28 NOTE — TELEPHONE ENCOUNTER
Pt states that she is having extreme pain in her hands from her spinal stenosis and Dr Vipul Blevins told her the next step would be injections.  I offered an appointment but Pt wanted to see what Dr Vipul Blevins had to say first.

## 2020-11-05 ENCOUNTER — OFFICE VISIT (OUTPATIENT)
Dept: PAIN CLINIC | Facility: HOSPITAL | Age: 75
End: 2020-11-05
Attending: ANESTHESIOLOGY
Payer: MEDICARE

## 2020-11-05 VITALS — HEIGHT: 61 IN | WEIGHT: 122 LBS | BODY MASS INDEX: 23.03 KG/M2 | RESPIRATION RATE: 18 BRPM

## 2020-11-05 DIAGNOSIS — M48.02 CERVICAL STENOSIS OF SPINAL CANAL: ICD-10-CM

## 2020-11-05 DIAGNOSIS — M54.10 RADICULOPATHY AFFECTING UPPER EXTREMITY: ICD-10-CM

## 2020-11-05 DIAGNOSIS — M54.12 NEUROPATHY, CERVICAL (RADICULAR): ICD-10-CM

## 2020-11-05 DIAGNOSIS — M48.02 NEUROFORAMINAL STENOSIS OF CERVICAL SPINE: Primary | ICD-10-CM

## 2020-11-05 PROCEDURE — 99201 HC OUTPT EVAL AND MGNT NEW PT LEVEL 1: CPT

## 2020-11-05 NOTE — PROGRESS NOTES
11/5/2020-Presents ambulatory to [facility] to establish care;  NEW CONSULT C/ORLBP INTO RT GROIN & DOWN RT THIGH sevedre NUMB/TING in bilat. Hands; Pt states this is an ongoing issue;   For her hands she tried acupuncture  Which helped in the beginning; no so no

## 2020-11-05 NOTE — CHRONIC PAIN
[facility]  Pain Consultation     HISTORY OF PRESENT ILLNESS:  Tameka Gross is a 69year old old female referred to the pain clinic by Dr. Tony Chapa for Neuroforaminal stenosis of cervical spine  (primary encounter diagnosis)  Cervical sten Bowel/Bladder Incontinence: as above  Coughing/sneezing/straining does not exacerbate the pain.   Numbness/tingling: as above  Weakness: as above  Weight Loss: Negative   Fever: Negative   Cardiovascular:  No current chest pain or palpitations   Respirato Smokeless tobacco: Never Used    Substance and Sexual Activity      Alcohol use:  Yes        Alcohol/week: 0.0 standard drinks        Comment: occ      Drug use: No      Sexual activity: Not Currently    Lifestyle      Physical activity        Days per week Trapezius and neck paraspinal muscles    Skin - normal     Temperature:  normal to touch bilateral upper and lower extremities  Edema - Absent  Sensation (light touch/pinprick/temperature):     Right Upper Extremity:  Decreased hand  Left Upper Extremity: involving the myah. CERVICAL DISC LEVELS:  C2-C3:  Mild right neural foraminal stenosis related to asymmetric uncovertebral joint hypertrophy and facet arthropathy. No additional neural compromise.   C3-C4:  Posterior disc-osteophyte complex with mild findings as above.         Dictated by (CST): Jc Alamo MD on 8/22/2019 at 15:38       Approved by (CST): Jc Alamo MD on 8/22/2019 at 15:42            LABS:  Lab Results   Component Value Date    WBC 8.7 08/19/2020    RBC 3.78 (L) 08/19/2020 agreeable to discussion plan.  Greater than 50% of the time was spent with counseling (nature of discussion centered around pain, therapy, and treatment options), face to face time, time spent reviewing data, obtaining patient information and discussing the

## 2020-11-23 ENCOUNTER — TELEPHONE (OUTPATIENT)
Dept: INTERNAL MEDICINE CLINIC | Facility: CLINIC | Age: 75
End: 2020-11-23

## 2020-12-02 ENCOUNTER — OFFICE VISIT (OUTPATIENT)
Dept: INTERNAL MEDICINE CLINIC | Facility: CLINIC | Age: 75
End: 2020-12-02
Payer: MEDICARE

## 2020-12-02 VITALS
HEIGHT: 61 IN | WEIGHT: 119 LBS | SYSTOLIC BLOOD PRESSURE: 140 MMHG | BODY MASS INDEX: 22.47 KG/M2 | DIASTOLIC BLOOD PRESSURE: 82 MMHG

## 2020-12-02 DIAGNOSIS — H65.01 NON-RECURRENT ACUTE SEROUS OTITIS MEDIA OF RIGHT EAR: Primary | ICD-10-CM

## 2020-12-02 PROCEDURE — 99213 OFFICE O/P EST LOW 20 MIN: CPT | Performed by: INTERNAL MEDICINE

## 2020-12-02 PROCEDURE — 3079F DIAST BP 80-89 MM HG: CPT | Performed by: INTERNAL MEDICINE

## 2020-12-02 PROCEDURE — 3077F SYST BP >= 140 MM HG: CPT | Performed by: INTERNAL MEDICINE

## 2020-12-02 PROCEDURE — 3008F BODY MASS INDEX DOCD: CPT | Performed by: INTERNAL MEDICINE

## 2020-12-02 RX ORDER — AZITHROMYCIN 250 MG/1
TABLET, FILM COATED ORAL
Qty: 6 TABLET | Refills: 0 | Status: SHIPPED | OUTPATIENT
Start: 2020-12-02 | End: 2020-12-03

## 2020-12-02 RX ORDER — GABAPENTIN 300 MG/1
300 CAPSULE ORAL 3 TIMES DAILY
Qty: 90 CAPSULE | Refills: 5 | Status: SHIPPED | OUTPATIENT
Start: 2020-12-02 | End: 2021-10-25

## 2020-12-02 NOTE — PROGRESS NOTES
HPI:    Patient ID: Sindy Dempsey is a 69year old female. HPIPt here for evaluation of some right ear congestion and a screeching sound in that ear. Has been using mucinex with some partial relief. No fever or chills. No cough or chest congestion.

## 2020-12-03 ENCOUNTER — TELEPHONE (OUTPATIENT)
Dept: INTERNAL MEDICINE CLINIC | Facility: CLINIC | Age: 75
End: 2020-12-03

## 2020-12-03 RX ORDER — AMOXICILLIN 500 MG/1
500 CAPSULE ORAL 3 TIMES DAILY
Qty: 21 CAPSULE | Refills: 0 | Status: SHIPPED | OUTPATIENT
Start: 2020-12-03 | End: 2020-12-10

## 2020-12-03 NOTE — TELEPHONE ENCOUNTER
Stated the diarrhea as been very bad and probably has \"nothing left to clear out\". Denies any reactions to medications in the past.  Asked her to not take anymore zpack and not take anything to counter the diarrhea until further notice.   Keep hydrated a

## 2020-12-03 NOTE — TELEPHONE ENCOUNTER
Pt was given a Z-pack yesterday and she took the medication at 8:30am and then at 10:30am she started to have stomach cramps with diarrhea and its just getting worse. Please advise.

## 2021-01-13 ENCOUNTER — TELEPHONE (OUTPATIENT)
Dept: INTERNAL MEDICINE CLINIC | Facility: CLINIC | Age: 76
End: 2021-01-13

## 2021-01-18 NOTE — TELEPHONE ENCOUNTER
No additional referrals noted for pain doctor. One was approved and she saw that physician already. If its referring to pain management program, that is in the hands of the specialty. If any other questions, call our office.

## 2021-03-12 DIAGNOSIS — Z23 NEED FOR VACCINATION: ICD-10-CM

## 2021-03-16 ENCOUNTER — IMMUNIZATION (OUTPATIENT)
Dept: LAB | Facility: HOSPITAL | Age: 76
End: 2021-03-16
Attending: HOSPITALIST
Payer: MEDICARE

## 2021-03-16 DIAGNOSIS — Z23 NEED FOR VACCINATION: Primary | ICD-10-CM

## 2021-03-16 PROCEDURE — 0011A SARSCOV2 VAC 100MCG/0.5ML IM: CPT

## 2021-04-13 ENCOUNTER — IMMUNIZATION (OUTPATIENT)
Dept: LAB | Facility: HOSPITAL | Age: 76
End: 2021-04-13
Attending: EMERGENCY MEDICINE
Payer: MEDICARE

## 2021-04-13 DIAGNOSIS — Z23 NEED FOR VACCINATION: Primary | ICD-10-CM

## 2021-04-13 PROCEDURE — 0012A SARSCOV2 VAC 100MCG/0.5ML IM: CPT

## 2021-04-15 ENCOUNTER — OFFICE VISIT (OUTPATIENT)
Dept: INTERNAL MEDICINE CLINIC | Facility: CLINIC | Age: 76
End: 2021-04-15
Payer: MEDICARE

## 2021-04-15 VITALS
HEIGHT: 61 IN | DIASTOLIC BLOOD PRESSURE: 80 MMHG | SYSTOLIC BLOOD PRESSURE: 150 MMHG | BODY MASS INDEX: 22.47 KG/M2 | WEIGHT: 119 LBS | OXYGEN SATURATION: 94 % | HEART RATE: 79 BPM

## 2021-04-15 DIAGNOSIS — M54.12 CERVICAL RADICULOPATHY: Primary | ICD-10-CM

## 2021-04-15 DIAGNOSIS — M54.16 LUMBAR RADICULOPATHY: ICD-10-CM

## 2021-04-15 PROCEDURE — 99214 OFFICE O/P EST MOD 30 MIN: CPT | Performed by: INTERNAL MEDICINE

## 2021-04-15 PROCEDURE — 3077F SYST BP >= 140 MM HG: CPT | Performed by: INTERNAL MEDICINE

## 2021-04-15 PROCEDURE — 3079F DIAST BP 80-89 MM HG: CPT | Performed by: INTERNAL MEDICINE

## 2021-04-15 PROCEDURE — 3008F BODY MASS INDEX DOCD: CPT | Performed by: INTERNAL MEDICINE

## 2021-04-15 NOTE — PROGRESS NOTES
HPI/Subjective:   Patient ID: Jaqui Flores is a 77year old female. HPIPatient here for fu on cervical spinal stenosis causing compressive symptoms in the hands. Has not had any injection therapy.   Pain is bad  inspite of 300mg of gabapentin    Hist

## 2021-04-26 ENCOUNTER — TELEPHONE (OUTPATIENT)
Dept: INTERNAL MEDICINE CLINIC | Facility: CLINIC | Age: 76
End: 2021-04-26

## 2021-04-26 NOTE — TELEPHONE ENCOUNTER
Patient saw Dr. Tamela Garcia in 2018 and the office made reference to that visit. You referred her this time to [facility]. Patient wants to know who YOU suggest she should see going forward. Informed her it's up to her, but she wants PCP's advice.   Will send rep

## 2021-04-27 ENCOUNTER — TELEPHONE (OUTPATIENT)
Dept: NEUROLOGY | Facility: CLINIC | Age: 76
End: 2021-04-27

## 2021-05-04 ENCOUNTER — OFFICE VISIT (OUTPATIENT)
Dept: NEUROLOGY | Facility: CLINIC | Age: 76
End: 2021-05-04
Payer: MEDICARE

## 2021-05-04 VITALS — WEIGHT: 118 LBS | HEIGHT: 61 IN | BODY MASS INDEX: 22.28 KG/M2

## 2021-05-04 DIAGNOSIS — R20.0 NUMBNESS AND TINGLING: Primary | ICD-10-CM

## 2021-05-04 DIAGNOSIS — G56.03 CARPAL TUNNEL SYNDROME ON BOTH SIDES: ICD-10-CM

## 2021-05-04 DIAGNOSIS — R20.2 NUMBNESS AND TINGLING: Primary | ICD-10-CM

## 2021-05-04 PROCEDURE — 99214 OFFICE O/P EST MOD 30 MIN: CPT | Performed by: PHYSICAL MEDICINE & REHABILITATION

## 2021-05-04 PROCEDURE — 3008F BODY MASS INDEX DOCD: CPT | Performed by: PHYSICAL MEDICINE & REHABILITATION

## 2021-05-04 RX ORDER — MELOXICAM 15 MG/1
TABLET ORAL
Qty: 30 TABLET | Refills: 0 | Status: SHIPPED | OUTPATIENT
Start: 2021-05-04 | End: 2021-09-20

## 2021-05-04 NOTE — PATIENT INSTRUCTIONS
If Doctor has ordered an injection or MRI and if you do not hear from us within 3 business days please call the office or send a message through [address] and ask if the injection/MRI has been approved

## 2021-05-05 NOTE — PROGRESS NOTES
Progress note    C/C: bilateral hand numbness, tingling, pain    HPI: 80-year-old female presents for follow-up; previously seen by my partner Dr. Tamela Garcia.   Was diagnosed with carpal tunnel syndrome, instructed to wear wrist braces, which she attempted to do cyst    Due to worsening symptoms despite gabapentin and a trial of wrist bracing would recommend EMG of the bilateral upper extremities to confirm the diagnosis of carpal tunnel syndrome and to evaluate the severity of the findings, given the slight weakn

## 2021-06-02 ENCOUNTER — PROCEDURE VISIT (OUTPATIENT)
Dept: NEUROLOGY | Facility: CLINIC | Age: 76
End: 2021-06-02
Payer: MEDICARE

## 2021-06-02 DIAGNOSIS — R20.2 NUMBNESS AND TINGLING: ICD-10-CM

## 2021-06-02 DIAGNOSIS — R20.0 NUMBNESS AND TINGLING: ICD-10-CM

## 2021-06-02 PROCEDURE — 95910 NRV CNDJ TEST 7-8 STUDIES: CPT | Performed by: PHYSICAL MEDICINE & REHABILITATION

## 2021-06-02 PROCEDURE — 95885 MUSC TST DONE W/NERV TST LIM: CPT | Performed by: PHYSICAL MEDICINE & REHABILITATION

## 2021-06-02 NOTE — PROCEDURES
[address]  Phone: [phone]  Fax: [phone]    ELECTRODIAGNOSTIC REPORT          Patient: Justice Lau Hand Dominance:  right   Patient ID: [id number] Referring Dr:  Dr. Post Digit II (Antidromic) study  o the response was considered absent for Wrist stimulation  • In the R Ulnar - Digit V (Antidromic) study  o the peak latency was increased for Wrist stimulation  o the peak amplitude was reduced for Wrist stimulation  • In the ADM 8        B. Elbow ADM 6.35 9.7 101 5.52 B. Elbow - Wrist 22 3.85 57      A. Elbow ADM 7.76 9.5 98.2 5.57 A. Elbow - B. Elbow 10 1.41 71       Sensory NCS      Nerve / Sites Rec.  Site Onset Lat Peak Lat NP Amp PP Amp Segments Distance Velocity     ms ms µV µ

## 2021-06-02 NOTE — PROGRESS NOTES
Findings were discussed at the conclusion of the visit. Moderate-severe carpal tunnel on the right, severe on the left with active findings of denervation in the left abductor pollicis brevis. No abnormal findings in the right abductor pollicis brevis.

## 2021-09-20 ENCOUNTER — OFFICE VISIT (OUTPATIENT)
Dept: INTERNAL MEDICINE CLINIC | Facility: CLINIC | Age: 76
End: 2021-09-20
Payer: MEDICARE

## 2021-09-20 VITALS
HEIGHT: 61 IN | HEART RATE: 76 BPM | DIASTOLIC BLOOD PRESSURE: 84 MMHG | WEIGHT: 118.81 LBS | BODY MASS INDEX: 22.43 KG/M2 | SYSTOLIC BLOOD PRESSURE: 142 MMHG | OXYGEN SATURATION: 99 %

## 2021-09-20 DIAGNOSIS — M54.12 CERVICAL RADICULOPATHY: ICD-10-CM

## 2021-09-20 DIAGNOSIS — Z00.00 MEDICARE ANNUAL WELLNESS VISIT, SUBSEQUENT: Primary | ICD-10-CM

## 2021-09-20 DIAGNOSIS — Z12.31 SCREENING MAMMOGRAM, ENCOUNTER FOR: ICD-10-CM

## 2021-09-20 DIAGNOSIS — I10 ESSENTIAL HYPERTENSION: ICD-10-CM

## 2021-09-20 DIAGNOSIS — M54.16 LUMBAR RADICULOPATHY: ICD-10-CM

## 2021-09-20 PROCEDURE — G0439 PPPS, SUBSEQ VISIT: HCPCS | Performed by: INTERNAL MEDICINE

## 2021-09-20 PROCEDURE — 99397 PER PM REEVAL EST PAT 65+ YR: CPT | Performed by: INTERNAL MEDICINE

## 2021-09-20 PROCEDURE — 3077F SYST BP >= 140 MM HG: CPT | Performed by: INTERNAL MEDICINE

## 2021-09-20 PROCEDURE — 96160 PT-FOCUSED HLTH RISK ASSMT: CPT | Performed by: INTERNAL MEDICINE

## 2021-09-20 PROCEDURE — 3079F DIAST BP 80-89 MM HG: CPT | Performed by: INTERNAL MEDICINE

## 2021-09-20 PROCEDURE — 3008F BODY MASS INDEX DOCD: CPT | Performed by: INTERNAL MEDICINE

## 2021-09-20 NOTE — PROGRESS NOTES
Subjective:   Patient ID: Branden Denny is a 77year old female. HPI Pt here for MA supervisit and fu on spinal stenosis and seasonal allergies. HPI:   Branden Denny is a 77year old female who presents for a MA Supervisit.     Patient Active Prob affect your day to day activities?: 0-No     Have you had any memory issues?: 0-No    Fall/Risk Scorin          Depression Screening (PHQ-2/PHQ-9): Over the LAST 2 WEEKS                      Advance Directives     Do you have a healthcare power of atto Comment: occ    Drug use: No    Occ: retired : yes      REVIEW OF SYSTEMS:   GENERAL: feels well otherwise  SKIN: denies any unusual skin lesions  EYES: denies blurred vision or double vision  HEENT: denies nasal congestion, sinus pain or ST post na female who presents for a Medicare Assessment.      PLAN SUMMARY:   Diagnoses and all orders for this visit:    Medicare annual wellness visit, subsequent    Lumbar radiculopathy    Cervical radiculopathy    Essential hypertension    Other orders  -     OCC any previous visit.  Update Immunization Activity if applicable    Pneumococcal Orders placed or performed in visit on 12/20/17   • PNEUMOCOCCAL IMM (PNEUMOVAX)   Orders placed or performed in visit on 10/11/16   • PNEUMOCOCCAL VACC, 13 RASHEEDA IM    Update Imm Patients q2 year if indicated No recommendations at this time   Mammogram Age > 44 q1 year Mammogram due on 09/09/2021   EKG All Patients One at initial exam    Vaccines:      Pneumococcal All Patients Once per lifetime Orders placed or performed in visit for mammogram  Lumbar radiculopathy on Gabapentin  Cervical radiculopathy as above  Essential hypertension controlled with hctz    No orders of the defined types were placed in this encounter.       Meds This Visit:  Requested Prescriptions      No prescrip

## 2021-10-06 ENCOUNTER — NURSE ONLY (OUTPATIENT)
Dept: INTERNAL MEDICINE CLINIC | Facility: CLINIC | Age: 76
End: 2021-10-06
Payer: MEDICARE

## 2021-10-22 ENCOUNTER — HOSPITAL ENCOUNTER (OUTPATIENT)
Dept: MAMMOGRAPHY | Age: 76
Discharge: HOME | End: 2021-10-22
Attending: INTERNAL MEDICINE
Payer: MEDICARE

## 2021-10-22 DIAGNOSIS — Z12.31 SCREENING MAMMOGRAM, ENCOUNTER FOR: ICD-10-CM

## 2021-10-22 PROCEDURE — 77063 BREAST TOMOSYNTHESIS BI: CPT | Performed by: INTERNAL MEDICINE

## 2021-10-22 PROCEDURE — 77067 SCR MAMMO BI INCL CAD: CPT | Performed by: INTERNAL MEDICINE

## 2021-10-25 RX ORDER — GABAPENTIN 300 MG/1
CAPSULE ORAL
Qty: 90 CAPSULE | Refills: 0 | Status: SHIPPED | OUTPATIENT
Start: 2021-10-25 | End: 2021-11-22

## 2021-10-25 NOTE — TELEPHONE ENCOUNTER
Requested Prescriptions     Pending Prescriptions Disp Refills   • GABAPENTIN 300 MG Oral Cap [Pharmacy Med Name: Gabapentin 300 MG Oral Capsule] 90 capsule 0     Sig: TAKE 1 CAPSULE BY MOUTH THREE TIMES DAILY     Last office visit: 9-20-21  Medication las

## 2021-11-16 RX ORDER — HYDROCHLOROTHIAZIDE 25 MG/1
TABLET ORAL
Qty: 90 TABLET | Refills: 0 | Status: SHIPPED | OUTPATIENT
Start: 2021-11-16

## 2021-11-16 NOTE — TELEPHONE ENCOUNTER
Requested Prescriptions     Pending Prescriptions Disp Refills   • HYDROCHLOROTHIAZIDE 25 MG Oral Tab [Pharmacy Med Name: hydroCHLOROthiazide 25 MG Oral Tablet] 90 tablet 0     Sig: Take 1 tablet by mouth once daily     Last office visit: 9-20-21  [address] [place] [address]

## 2021-11-22 RX ORDER — GABAPENTIN 300 MG/1
CAPSULE ORAL
Qty: 90 CAPSULE | Refills: 0 | Status: SHIPPED | OUTPATIENT
Start: 2021-11-22 | End: 2021-12-20

## 2021-12-20 RX ORDER — GABAPENTIN 300 MG/1
CAPSULE ORAL
Qty: 90 CAPSULE | Refills: 0 | Status: SHIPPED | OUTPATIENT
Start: 2021-12-20 | End: 2022-01-21

## 2022-01-21 RX ORDER — GABAPENTIN 300 MG/1
CAPSULE ORAL
Qty: 90 CAPSULE | Refills: 0 | Status: SHIPPED | OUTPATIENT
Start: 2022-01-21

## 2022-02-14 RX ORDER — HYDROCHLOROTHIAZIDE 25 MG/1
TABLET ORAL
Qty: 90 TABLET | Refills: 0 | Status: SHIPPED | OUTPATIENT
Start: 2022-02-14

## 2022-02-14 NOTE — TELEPHONE ENCOUNTER
Hypertension Medications Protocol Failed 02/14/2022 09:13 AM   Protocol Details  CMP or BMP in past 12 months    Last serum creatinine< 2.0    Appointment in past 6 or next 3 months     Last OV 9/20/21   No Future appt

## 2022-02-21 RX ORDER — GABAPENTIN 300 MG/1
CAPSULE ORAL
Qty: 90 CAPSULE | Refills: 0 | Status: SHIPPED | OUTPATIENT
Start: 2022-02-21 | End: 2022-03-23

## 2022-03-23 RX ORDER — GABAPENTIN 300 MG/1
CAPSULE ORAL
Qty: 90 CAPSULE | Refills: 0 | Status: SHIPPED | OUTPATIENT
Start: 2022-03-23

## 2022-04-22 RX ORDER — GABAPENTIN 300 MG/1
CAPSULE ORAL
Qty: 90 CAPSULE | Refills: 0 | Status: SHIPPED | OUTPATIENT
Start: 2022-04-22

## 2022-05-17 RX ORDER — HYDROCHLOROTHIAZIDE 25 MG/1
TABLET ORAL
Qty: 90 TABLET | Refills: 0 | Status: SHIPPED | OUTPATIENT
Start: 2022-05-17

## 2022-05-23 RX ORDER — GABAPENTIN 300 MG/1
CAPSULE ORAL
Qty: 90 CAPSULE | Refills: 0 | Status: SHIPPED | OUTPATIENT
Start: 2022-05-23

## 2022-06-21 RX ORDER — GABAPENTIN 300 MG/1
CAPSULE ORAL
Qty: 90 CAPSULE | Refills: 0 | Status: SHIPPED | OUTPATIENT
Start: 2022-06-21

## 2022-07-21 ENCOUNTER — OFFICE VISIT (OUTPATIENT)
Dept: INTERNAL MEDICINE CLINIC | Facility: CLINIC | Age: 77
End: 2022-07-21
Payer: MEDICARE

## 2022-07-21 VITALS
SYSTOLIC BLOOD PRESSURE: 150 MMHG | BODY MASS INDEX: 22.31 KG/M2 | HEIGHT: 61 IN | HEART RATE: 76 BPM | OXYGEN SATURATION: 96 % | WEIGHT: 118.19 LBS | DIASTOLIC BLOOD PRESSURE: 84 MMHG

## 2022-07-21 DIAGNOSIS — M54.12 CERVICAL RADICULOPATHY: ICD-10-CM

## 2022-07-21 DIAGNOSIS — I10 ESSENTIAL HYPERTENSION: ICD-10-CM

## 2022-07-21 DIAGNOSIS — Z00.00 MEDICARE ANNUAL WELLNESS VISIT, SUBSEQUENT: Primary | ICD-10-CM

## 2022-07-21 DIAGNOSIS — Z12.31 VISIT FOR SCREENING MAMMOGRAM: ICD-10-CM

## 2022-07-21 DIAGNOSIS — M54.16 LUMBAR RADICULOPATHY: ICD-10-CM

## 2022-07-21 PROCEDURE — 3008F BODY MASS INDEX DOCD: CPT | Performed by: INTERNAL MEDICINE

## 2022-07-21 PROCEDURE — 96160 PT-FOCUSED HLTH RISK ASSMT: CPT | Performed by: INTERNAL MEDICINE

## 2022-07-21 PROCEDURE — 3077F SYST BP >= 140 MM HG: CPT | Performed by: INTERNAL MEDICINE

## 2022-07-21 PROCEDURE — 1125F AMNT PAIN NOTED PAIN PRSNT: CPT | Performed by: INTERNAL MEDICINE

## 2022-07-21 PROCEDURE — G0439 PPPS, SUBSEQ VISIT: HCPCS | Performed by: INTERNAL MEDICINE

## 2022-07-21 PROCEDURE — 3079F DIAST BP 80-89 MM HG: CPT | Performed by: INTERNAL MEDICINE

## 2022-07-21 PROCEDURE — 99397 PER PM REEVAL EST PAT 65+ YR: CPT | Performed by: INTERNAL MEDICINE

## 2022-07-21 RX ORDER — MONTELUKAST SODIUM 10 MG/1
10 TABLET ORAL NIGHTLY
Qty: 30 TABLET | Refills: 2 | Status: SHIPPED | OUTPATIENT
Start: 2022-07-21

## 2022-07-22 RX ORDER — GABAPENTIN 300 MG/1
CAPSULE ORAL
Qty: 90 CAPSULE | Refills: 0 | Status: SHIPPED | OUTPATIENT
Start: 2022-07-22

## 2022-08-16 ENCOUNTER — TELEPHONE (OUTPATIENT)
Dept: INTERNAL MEDICINE CLINIC | Facility: CLINIC | Age: 77
End: 2022-08-16

## 2022-08-16 RX ORDER — HYDROCHLOROTHIAZIDE 25 MG/1
TABLET ORAL
Qty: 90 TABLET | Refills: 0 | Status: SHIPPED | OUTPATIENT
Start: 2022-08-16

## 2022-08-16 NOTE — TELEPHONE ENCOUNTER
1) Montelukast: 7/21/22 ordered. Started on 7/22/22. Nares are red and sore; blowing nose has some blood stain in it. Actually feels dry so she uses nasal spray before blowing her nose. Nose, face, forehead, neck all are itchy. 2) Gabapentin: Has been reading about the potential side-effects. She does get dizzy when she takes it and occasional brain fog (at a loss for words). Should she be concerned?

## 2022-08-16 NOTE — TELEPHONE ENCOUNTER
Patient would like a call back has some questions regarding montelukast 10 MG Oral Tab and what she is experiencing on the medication.

## 2022-08-22 RX ORDER — GABAPENTIN 300 MG/1
CAPSULE ORAL
Qty: 90 CAPSULE | Refills: 0 | Status: SHIPPED | OUTPATIENT
Start: 2022-08-22

## 2022-09-20 RX ORDER — GABAPENTIN 300 MG/1
CAPSULE ORAL
Qty: 90 CAPSULE | Refills: 0 | Status: SHIPPED | OUTPATIENT
Start: 2022-09-20

## 2022-10-19 RX ORDER — GABAPENTIN 300 MG/1
CAPSULE ORAL
Qty: 90 CAPSULE | Refills: 0 | Status: SHIPPED | OUTPATIENT
Start: 2022-10-19

## 2022-10-26 ENCOUNTER — HOSPITAL ENCOUNTER (OUTPATIENT)
Dept: MAMMOGRAPHY | Age: 77
Discharge: HOME | End: 2022-10-26
Attending: INTERNAL MEDICINE
Payer: MEDICARE

## 2022-10-26 DIAGNOSIS — Z12.31 VISIT FOR SCREENING MAMMOGRAM: ICD-10-CM

## 2022-10-26 PROCEDURE — 77063 BREAST TOMOSYNTHESIS BI: CPT | Performed by: INTERNAL MEDICINE

## 2022-10-26 PROCEDURE — 77067 SCR MAMMO BI INCL CAD: CPT | Performed by: INTERNAL MEDICINE

## 2022-11-14 RX ORDER — HYDROCHLOROTHIAZIDE 25 MG/1
TABLET ORAL
Qty: 90 TABLET | Refills: 0 | Status: SHIPPED | OUTPATIENT
Start: 2022-11-14

## 2022-11-15 ENCOUNTER — TELEPHONE (OUTPATIENT)
Dept: INTERNAL MEDICINE CLINIC | Facility: CLINIC | Age: 77
End: 2022-11-15

## 2022-11-15 NOTE — TELEPHONE ENCOUNTER
Patient is calling in stating she is having trouble with her sinuses. States she would like medication. Please call to advise.

## 2022-11-21 RX ORDER — GABAPENTIN 300 MG/1
CAPSULE ORAL
Qty: 90 CAPSULE | Refills: 0 | Status: SHIPPED | OUTPATIENT
Start: 2022-11-21

## 2022-12-20 RX ORDER — TRIAMCINOLONE ACETONIDE 1 MG/G
CREAM TOPICAL
Qty: 60 G | Refills: 0 | Status: SHIPPED | OUTPATIENT
Start: 2022-12-20

## 2022-12-20 RX ORDER — GABAPENTIN 300 MG/1
CAPSULE ORAL
Qty: 90 CAPSULE | Refills: 0 | Status: SHIPPED | OUTPATIENT
Start: 2022-12-20

## 2023-01-17 RX ORDER — GABAPENTIN 300 MG/1
CAPSULE ORAL
Qty: 90 CAPSULE | Refills: 0 | Status: SHIPPED | OUTPATIENT
Start: 2023-01-17

## 2023-02-13 ENCOUNTER — TELEPHONE (OUTPATIENT)
Dept: INTERNAL MEDICINE CLINIC | Facility: CLINIC | Age: 78
End: 2023-02-13

## 2023-02-13 RX ORDER — HYDROCHLOROTHIAZIDE 25 MG/1
25 TABLET ORAL DAILY
Qty: 30 TABLET | Refills: 0 | Status: SHIPPED | OUTPATIENT
Start: 2023-02-13

## 2023-02-13 NOTE — TELEPHONE ENCOUNTER
Refill sent for 30 days as a courtesy, Dr Jhony Johnson would like a visit to provide further refills.

## 2023-02-20 RX ORDER — GABAPENTIN 300 MG/1
CAPSULE ORAL
Qty: 90 CAPSULE | Refills: 0 | Status: SHIPPED | OUTPATIENT
Start: 2023-02-20

## 2023-02-20 NOTE — TELEPHONE ENCOUNTER
Patient called, as she checked with her Pharmacy and no medication has been sent. Patient said she will be out of Gabapentin tomorrow.

## 2023-03-13 RX ORDER — HYDROCHLOROTHIAZIDE 25 MG/1
TABLET ORAL
Qty: 30 TABLET | Refills: 0 | Status: SHIPPED | OUTPATIENT
Start: 2023-03-13

## 2023-03-21 RX ORDER — GABAPENTIN 300 MG/1
CAPSULE ORAL
Qty: 90 CAPSULE | Refills: 0 | Status: SHIPPED | OUTPATIENT
Start: 2023-03-21

## 2023-04-12 RX ORDER — HYDROCHLOROTHIAZIDE 25 MG/1
TABLET ORAL
Qty: 30 TABLET | Refills: 0 | Status: SHIPPED | OUTPATIENT
Start: 2023-04-12

## 2023-04-20 RX ORDER — GABAPENTIN 300 MG/1
CAPSULE ORAL
Qty: 90 CAPSULE | Refills: 0 | Status: SHIPPED | OUTPATIENT
Start: 2023-04-20

## 2023-05-11 RX ORDER — HYDROCHLOROTHIAZIDE 25 MG/1
TABLET ORAL
Qty: 30 TABLET | Refills: 0 | Status: SHIPPED | OUTPATIENT
Start: 2023-05-11

## 2023-05-22 ENCOUNTER — OFFICE VISIT (OUTPATIENT)
Dept: INTERNAL MEDICINE CLINIC | Facility: CLINIC | Age: 78
End: 2023-05-22
Payer: MEDICARE

## 2023-05-22 VITALS
DIASTOLIC BLOOD PRESSURE: 82 MMHG | WEIGHT: 115 LBS | BODY MASS INDEX: 21.71 KG/M2 | HEIGHT: 61 IN | HEART RATE: 83 BPM | OXYGEN SATURATION: 94 % | SYSTOLIC BLOOD PRESSURE: 162 MMHG

## 2023-05-22 DIAGNOSIS — L85.3 DRY SKIN: ICD-10-CM

## 2023-05-22 DIAGNOSIS — R42 INTERMITTENT LIGHTHEADEDNESS: Primary | ICD-10-CM

## 2023-05-22 PROCEDURE — 3079F DIAST BP 80-89 MM HG: CPT

## 2023-05-22 PROCEDURE — 1159F MED LIST DOCD IN RCRD: CPT

## 2023-05-22 PROCEDURE — 99213 OFFICE O/P EST LOW 20 MIN: CPT

## 2023-05-22 PROCEDURE — 3077F SYST BP >= 140 MM HG: CPT

## 2023-05-22 PROCEDURE — 3008F BODY MASS INDEX DOCD: CPT

## 2023-05-22 PROCEDURE — 1160F RVW MEDS BY RX/DR IN RCRD: CPT

## 2023-05-22 RX ORDER — TRIAMCINOLONE ACETONIDE 1 MG/G
1 CREAM TOPICAL 2 TIMES DAILY PRN
Qty: 60 G | Refills: 0 | Status: SHIPPED | OUTPATIENT
Start: 2023-05-22

## 2023-05-22 RX ORDER — GABAPENTIN 300 MG/1
300 CAPSULE ORAL 3 TIMES DAILY
Qty: 90 CAPSULE | Refills: 2 | Status: SHIPPED | OUTPATIENT
Start: 2023-05-22

## 2023-06-07 ENCOUNTER — TELEPHONE (OUTPATIENT)
Dept: INTERNAL MEDICINE CLINIC | Facility: CLINIC | Age: 78
End: 2023-06-07

## 2023-07-03 RX ORDER — HYDROCHLOROTHIAZIDE 25 MG/1
25 TABLET ORAL DAILY
Qty: 30 TABLET | Refills: 0 | Status: SHIPPED | OUTPATIENT
Start: 2023-07-03

## 2023-08-10 RX ORDER — HYDROCHLOROTHIAZIDE 25 MG/1
25 TABLET ORAL DAILY
Qty: 30 TABLET | Refills: 0 | Status: SHIPPED | OUTPATIENT
Start: 2023-08-10

## 2023-08-10 NOTE — TELEPHONE ENCOUNTER
A refill request was received for:  Requested Prescriptions     Pending Prescriptions Disp Refills    HYDROCHLOROTHIAZIDE 25 MG Oral Tab [Pharmacy Med Name: hydroCHLOROthiazide 25 MG Oral Tablet] 30 tablet 0     Sig: Take 1 tablet by mouth once daily     Last refill date:  7/3/23    Last office visit: 5/22/23      Future Appointments   Date Time Provider Ashley Antonio   8/15/2023  1:30 PM Gerhardt Slocumb, MD [address]

## 2023-08-15 ENCOUNTER — OFFICE VISIT (OUTPATIENT)
Dept: INTERNAL MEDICINE CLINIC | Facility: CLINIC | Age: 78
End: 2023-08-15
Payer: MEDICARE

## 2023-08-15 VITALS
TEMPERATURE: 100 F | WEIGHT: 115 LBS | DIASTOLIC BLOOD PRESSURE: 80 MMHG | SYSTOLIC BLOOD PRESSURE: 130 MMHG | BODY MASS INDEX: 21.71 KG/M2 | HEART RATE: 85 BPM | HEIGHT: 61 IN | OXYGEN SATURATION: 97 %

## 2023-08-15 DIAGNOSIS — Z12.31 SCREENING MAMMOGRAM, ENCOUNTER FOR: ICD-10-CM

## 2023-08-15 DIAGNOSIS — M54.12 CERVICAL RADICULOPATHY: ICD-10-CM

## 2023-08-15 DIAGNOSIS — Z00.00 MEDICARE ANNUAL WELLNESS VISIT, SUBSEQUENT: Primary | ICD-10-CM

## 2023-08-15 DIAGNOSIS — I10 ESSENTIAL HYPERTENSION: ICD-10-CM

## 2023-08-15 RX ORDER — AMLODIPINE BESYLATE 2.5 MG/1
2.5 TABLET ORAL DAILY
Qty: 30 TABLET | Refills: 11 | Status: SHIPPED | OUTPATIENT
Start: 2023-08-15

## 2023-08-21 RX ORDER — GABAPENTIN 300 MG/1
300 CAPSULE ORAL 3 TIMES DAILY
Qty: 90 CAPSULE | Refills: 2 | Status: SHIPPED | OUTPATIENT
Start: 2023-08-21

## 2023-08-21 NOTE — TELEPHONE ENCOUNTER
Per LOV note 8/15/23:  Cervical radiculopathy pain control with gabapentin       Rx pended, routed to MD.

## 2023-08-21 NOTE — TELEPHONE ENCOUNTER
Patient called to request a refill of:    gabapentin 300 MG Oral Cap     Patient last seen for annual physical on 08/15/2023. If approved, please send refill to:     [facility] DR ASYA SOLOMON [address] [facility], [address], [phone]     Any questions or concerns, please call patient at:    Reddy Caro

## 2023-09-15 ENCOUNTER — TELEPHONE (OUTPATIENT)
Dept: INTERNAL MEDICINE CLINIC | Facility: CLINIC | Age: 78
End: 2023-09-15

## 2023-10-28 ENCOUNTER — HOSPITAL ENCOUNTER (OUTPATIENT)
Dept: MAMMOGRAPHY | Age: 78
Discharge: HOME | End: 2023-10-28
Attending: INTERNAL MEDICINE

## 2023-10-28 DIAGNOSIS — Z12.31 SCREENING MAMMOGRAM, ENCOUNTER FOR: ICD-10-CM

## 2023-10-28 PROCEDURE — 77063 BREAST TOMOSYNTHESIS BI: CPT | Performed by: INTERNAL MEDICINE

## 2023-10-28 PROCEDURE — 77067 SCR MAMMO BI INCL CAD: CPT | Performed by: INTERNAL MEDICINE

## 2023-11-03 ENCOUNTER — APPOINTMENT (OUTPATIENT)
Dept: GENERAL RADIOLOGY | Age: 78
End: 2023-11-03
Attending: NURSE PRACTITIONER
Payer: MEDICARE

## 2023-11-03 ENCOUNTER — HOSPITAL ENCOUNTER (OUTPATIENT)
Age: 78
Discharge: HOME | End: 2023-11-03
Payer: MEDICARE

## 2023-11-03 ENCOUNTER — NURSE TRIAGE (OUTPATIENT)
Dept: INTERNAL MEDICINE CLINIC | Facility: CLINIC | Age: 78
End: 2023-11-03

## 2023-11-03 VITALS
OXYGEN SATURATION: 98 % | SYSTOLIC BLOOD PRESSURE: 151 MMHG | DIASTOLIC BLOOD PRESSURE: 70 MMHG | TEMPERATURE: 99 F | HEART RATE: 95 BPM | RESPIRATION RATE: 18 BRPM

## 2023-11-03 DIAGNOSIS — M54.50 ACUTE BILATERAL LOW BACK PAIN WITHOUT SCIATICA: Primary | ICD-10-CM

## 2023-11-03 PROCEDURE — 99213 OFFICE O/P EST LOW 20 MIN: CPT

## 2023-11-03 PROCEDURE — 99204 OFFICE O/P NEW MOD 45 MIN: CPT

## 2023-11-03 PROCEDURE — 72100 X-RAY EXAM L-S SPINE 2/3 VWS: CPT | Performed by: NURSE PRACTITIONER

## 2023-11-03 RX ORDER — LIDOCAINE 50 MG/G
1 PATCH TOPICAL EVERY 24 HOURS
Qty: 10 PATCH | Refills: 0 | Status: SHIPPED | OUTPATIENT
Start: 2023-11-03 | End: 2023-11-12

## 2023-11-03 RX ORDER — METHYLPREDNISOLONE 4 MG/1
TABLET ORAL
Qty: 1 EACH | Refills: 0 | Status: SHIPPED | OUTPATIENT
Start: 2023-11-03 | End: 2023-11-12

## 2023-11-03 NOTE — ED INITIAL ASSESSMENT (HPI)
Pt complaining of lower back pain since Monday. Denies injury. Pain does not radiate down legs, no numbness/tingling, no issues with bowel or bladder.

## 2023-11-03 NOTE — DISCHARGE INSTRUCTIONS
Ice to the back. Gentle stretching exercises. Take the medications as prescribed. Follow-up with your primary care doctor. For any worsening or concerning symptoms, go to the nearest emergency department for further evaluation.

## 2023-11-03 NOTE — TELEPHONE ENCOUNTER
Pt will go to [place] [id number] today for her back pain x 3 weeks. Reason for Disposition   SEVERE back pain (e.g., excruciating, unable to do any normal activities) and not improved after pain medicine and CARE ADVICE    Answer Assessment - Initial Assessment Questions  1. ONSET: \"When did the pain begin? \"       3 week  2. LOCATION: \"Where does it hurt? \" (upper, mid or lower back)     Lower back to both legs  3. SEVERITY: \"How bad is the pain? \"  (e.g., Scale 1-10; mild, moderate, or severe)    - MILD (1-3): doesn't interfere with normal activities     - MODERATE (4-7): interferes with normal activities or awakens from sleep     - SEVERE (8-10): excruciating pain, unable to do any normal activities       9/10  4. PATTERN: \"Is the pain constant? \" (e.g., yes, no; constant, intermittent)        Constant  5. RADIATION: \"Does the pain shoot into your legs or elsewhere? \"      Both legs  6. CAUSE:  \"What do you think is causing the back pain? \"       unsure  7. BACK OVERUSE:  Shannan Reel recent lifting of heavy objects, strenuous work or exercise?\"        8. MEDICATIONS: \"What have you taken so far for the pain? \" (e.g., nothing, acetaminophen, NSAIDS)      Mucinex/Tylenol   9. NEUROLOGIC SYMPTOMS: \"Do you have any weakness, numbness, or problems with bowel/bladder control? \"      no  10. OTHER SYMPTOMS: \"Do you have any other symptoms? \" (e.g., fever, abdominal pain, burning with urination, blood in urine)        Sinus problems/ nose bleeds  11. PREGNANCY: \"Is there any chance you are pregnant? \" (e.g., yes, no; LMP)        NA    Protocols used: Back Pain-A-[place]

## 2023-11-03 NOTE — TELEPHONE ENCOUNTER
Patient called the office asking to speak to a nurse. She is experiencing lower back pain for a while, but it seems to be getting worse. She would like to discuss her options to help elevate the pain.

## 2023-11-07 ENCOUNTER — HOSPITAL ENCOUNTER (EMERGENCY)
Facility: HOSPITAL | Age: 78
Discharge: HOME | End: 2023-11-07
Attending: STUDENT IN AN ORGANIZED HEALTH CARE EDUCATION/TRAINING PROGRAM
Payer: MEDICARE

## 2023-11-07 ENCOUNTER — HOSPITAL ENCOUNTER (EMERGENCY)
Facility: HOSPITAL | Age: 78
Discharge: HOME | DRG: 552 | End: 2023-11-07
Attending: STUDENT IN AN ORGANIZED HEALTH CARE EDUCATION/TRAINING PROGRAM
Payer: MEDICARE

## 2023-11-07 VITALS
DIASTOLIC BLOOD PRESSURE: 80 MMHG | TEMPERATURE: 98 F | SYSTOLIC BLOOD PRESSURE: 158 MMHG | RESPIRATION RATE: 18 BRPM | OXYGEN SATURATION: 99 % | HEART RATE: 76 BPM

## 2023-11-07 DIAGNOSIS — M54.40 BACK PAIN OF LUMBAR REGION WITH SCIATICA: Primary | ICD-10-CM

## 2023-11-07 PROCEDURE — 99284 EMERGENCY DEPT VISIT MOD MDM: CPT

## 2023-11-07 PROCEDURE — 99283 EMERGENCY DEPT VISIT LOW MDM: CPT

## 2023-11-07 PROCEDURE — 96372 THER/PROPH/DIAG INJ SC/IM: CPT

## 2023-11-07 RX ORDER — KETOROLAC TROMETHAMINE 15 MG/ML
30 INJECTION, SOLUTION INTRAMUSCULAR; INTRAVENOUS ONCE
Status: COMPLETED | OUTPATIENT
Start: 2023-11-07 | End: 2023-11-07

## 2023-11-07 NOTE — ED INITIAL ASSESSMENT (HPI)
Patient arrives via EMS for worsening lower back pain that radiates down right leg. Patient has chronic pain and currently take gabapentin.

## 2023-11-08 ENCOUNTER — HOSPITAL ENCOUNTER (INPATIENT)
Facility: HOSPITAL | Age: 78
LOS: 4 days | Discharge: HOME HEALTH | End: 2023-11-12
Attending: EMERGENCY MEDICINE | Admitting: STUDENT IN AN ORGANIZED HEALTH CARE EDUCATION/TRAINING PROGRAM
Payer: MEDICARE

## 2023-11-08 ENCOUNTER — HOSPITAL ENCOUNTER (INPATIENT)
Facility: HOSPITAL | Age: 78
LOS: 4 days | Discharge: HOME | End: 2023-11-12
Attending: EMERGENCY MEDICINE | Admitting: STUDENT IN AN ORGANIZED HEALTH CARE EDUCATION/TRAINING PROGRAM
Payer: MEDICARE

## 2023-11-08 ENCOUNTER — HOSPITAL ENCOUNTER (INPATIENT)
Facility: HOSPITAL | Age: 78
LOS: 4 days | Discharge: HOME HEALTH | DRG: 552 | End: 2023-11-12
Attending: EMERGENCY MEDICINE | Admitting: STUDENT IN AN ORGANIZED HEALTH CARE EDUCATION/TRAINING PROGRAM
Payer: MEDICARE

## 2023-11-08 DIAGNOSIS — M54.9 BACK PAIN WITH RADIATION: Primary | ICD-10-CM

## 2023-11-08 LAB
ANION GAP SERPL CALC-SCNC: 8 MMOL/L (ref 0–18)
BASOPHILS # BLD AUTO: 0.03 X10(3) UL (ref 0–0.2)
BASOPHILS NFR BLD AUTO: 0.2 %
BUN BLD-MCNC: 23 MG/DL (ref 9–23)
BUN/CREAT SERPL: 39 (ref 10–20)
CALCIUM BLD-MCNC: 9.9 MG/DL (ref 8.7–10.4)
CHLORIDE SERPL-SCNC: 106 MMOL/L (ref 98–112)
CO2 SERPL-SCNC: 27 MMOL/L (ref 21–32)
CREAT BLD-MCNC: 0.59 MG/DL
DEPRECATED RDW RBC AUTO: 43.8 FL (ref 35.1–46.3)
EGFRCR SERPLBLD CKD-EPI 2021: 93 ML/MIN/1.73M2 (ref 60–?)
EOSINOPHIL # BLD AUTO: 0.01 X10(3) UL (ref 0–0.7)
EOSINOPHIL NFR BLD AUTO: 0.1 %
ERYTHROCYTE [DISTWIDTH] IN BLOOD BY AUTOMATED COUNT: 11.9 % (ref 11–15)
GLUCOSE BLD-MCNC: 106 MG/DL (ref 70–99)
HCT VFR BLD AUTO: 35.1 %
HGB BLD-MCNC: 11.8 G/DL
IMM GRANULOCYTES # BLD AUTO: 0.05 X10(3) UL (ref 0–1)
IMM GRANULOCYTES NFR BLD: 0.3 %
LYMPHOCYTES # BLD AUTO: 2.85 X10(3) UL (ref 1–4)
LYMPHOCYTES NFR BLD AUTO: 19 %
MCH RBC QN AUTO: 33.8 PG (ref 26–34)
MCHC RBC AUTO-ENTMCNC: 33.6 G/DL (ref 31–37)
MCV RBC AUTO: 100.6 FL
MONOCYTES # BLD AUTO: 1.31 X10(3) UL (ref 0.1–1)
MONOCYTES NFR BLD AUTO: 8.7 %
NEUTROPHILS # BLD AUTO: 10.78 X10 (3) UL (ref 1.5–7.7)
NEUTROPHILS # BLD AUTO: 10.78 X10(3) UL (ref 1.5–7.7)
NEUTROPHILS NFR BLD AUTO: 71.7 %
OSMOLALITY SERPL CALC.SUM OF ELEC: 296 MOSM/KG (ref 275–295)
PLATELET # BLD AUTO: 398 10(3)UL (ref 150–450)
POTASSIUM SERPL-SCNC: 3.5 MMOL/L (ref 3.5–5.1)
RBC # BLD AUTO: 3.49 X10(6)UL
SODIUM SERPL-SCNC: 141 MMOL/L (ref 136–145)
WBC # BLD AUTO: 15 X10(3) UL (ref 4–11)

## 2023-11-08 PROCEDURE — 99222 1ST HOSP IP/OBS MODERATE 55: CPT | Performed by: HOSPITALIST

## 2023-11-08 RX ORDER — GABAPENTIN 300 MG/1
300 CAPSULE ORAL 3 TIMES DAILY
Status: DISCONTINUED | OUTPATIENT
Start: 2023-11-08 | End: 2023-11-12

## 2023-11-08 RX ORDER — HYDROCODONE BITARTRATE AND ACETAMINOPHEN 5; 325 MG/1; MG/1
2 TABLET ORAL EVERY 4 HOURS PRN
Status: DISCONTINUED | OUTPATIENT
Start: 2023-11-08 | End: 2023-11-10

## 2023-11-08 RX ORDER — MORPHINE SULFATE 4 MG/ML
4 INJECTION, SOLUTION INTRAMUSCULAR; INTRAVENOUS EVERY 2 HOUR PRN
Status: DISCONTINUED | OUTPATIENT
Start: 2023-11-08 | End: 2023-11-10

## 2023-11-08 RX ORDER — HYDROCODONE BITARTRATE AND ACETAMINOPHEN 5; 325 MG/1; MG/1
1 TABLET ORAL EVERY 4 HOURS PRN
Status: DISCONTINUED | OUTPATIENT
Start: 2023-11-08 | End: 2023-11-10

## 2023-11-08 RX ORDER — ONDANSETRON 2 MG/ML
4 INJECTION INTRAMUSCULAR; INTRAVENOUS EVERY 6 HOURS PRN
Status: DISCONTINUED | OUTPATIENT
Start: 2023-11-08 | End: 2023-11-12

## 2023-11-08 RX ORDER — MORPHINE SULFATE 2 MG/ML
1 INJECTION, SOLUTION INTRAMUSCULAR; INTRAVENOUS EVERY 2 HOUR PRN
Status: DISCONTINUED | OUTPATIENT
Start: 2023-11-08 | End: 2023-11-10

## 2023-11-08 RX ORDER — ACETAMINOPHEN 500 MG
500 TABLET ORAL EVERY 4 HOURS PRN
Status: DISCONTINUED | OUTPATIENT
Start: 2023-11-08 | End: 2023-11-10

## 2023-11-08 RX ORDER — MORPHINE SULFATE 4 MG/ML
4 INJECTION, SOLUTION INTRAMUSCULAR; INTRAVENOUS ONCE
Status: COMPLETED | OUTPATIENT
Start: 2023-11-08 | End: 2023-11-08

## 2023-11-08 RX ORDER — HYDROCODONE BITARTRATE AND ACETAMINOPHEN 5; 325 MG/1; MG/1
1 TABLET ORAL EVERY 6 HOURS PRN
Qty: 10 TABLET | Refills: 0 | Status: SHIPPED | OUTPATIENT
Start: 2023-11-08 | End: 2023-11-17

## 2023-11-08 RX ORDER — KETOROLAC TROMETHAMINE 10 MG/1
10 TABLET, FILM COATED ORAL EVERY 6 HOURS PRN
Qty: 20 TABLET | Refills: 0 | Status: SHIPPED | OUTPATIENT
Start: 2023-11-08 | End: 2023-11-17

## 2023-11-08 RX ORDER — MORPHINE SULFATE 2 MG/ML
2 INJECTION, SOLUTION INTRAMUSCULAR; INTRAVENOUS EVERY 2 HOUR PRN
Status: DISCONTINUED | OUTPATIENT
Start: 2023-11-08 | End: 2023-11-10

## 2023-11-08 RX ORDER — ACETAMINOPHEN 325 MG/1
650 TABLET ORAL EVERY 4 HOURS PRN
Status: DISCONTINUED | OUTPATIENT
Start: 2023-11-08 | End: 2023-11-10

## 2023-11-08 RX ORDER — METHYLPREDNISOLONE SODIUM SUCCINATE 40 MG/ML
40 INJECTION, POWDER, LYOPHILIZED, FOR SOLUTION INTRAMUSCULAR; INTRAVENOUS EVERY 12 HOURS
Status: DISCONTINUED | OUTPATIENT
Start: 2023-11-08 | End: 2023-11-10

## 2023-11-08 RX ORDER — TEMAZEPAM 15 MG/1
15 CAPSULE ORAL NIGHTLY PRN
Status: DISCONTINUED | OUTPATIENT
Start: 2023-11-08 | End: 2023-11-12

## 2023-11-08 RX ORDER — METOCLOPRAMIDE HYDROCHLORIDE 5 MG/ML
10 INJECTION INTRAMUSCULAR; INTRAVENOUS EVERY 8 HOURS PRN
Status: DISCONTINUED | OUTPATIENT
Start: 2023-11-08 | End: 2023-11-12

## 2023-11-08 RX ORDER — AMLODIPINE BESYLATE 2.5 MG/1
2.5 TABLET ORAL DAILY
Status: DISCONTINUED | OUTPATIENT
Start: 2023-11-09 | End: 2023-11-12

## 2023-11-08 NOTE — ED INITIAL ASSESSMENT (HPI)
Patient with hx of spinal stenosis, states on Wednesday her back pain became more severe. Had an xray done Friday and was told she has arthritis, has been taking a steroid pack with no relief. C/o lower back pain with radiation to right leg. Denies bowel/ bladder dysfunction.

## 2023-11-09 ENCOUNTER — APPOINTMENT (OUTPATIENT)
Dept: CT IMAGING | Facility: HOSPITAL | Age: 78
End: 2023-11-09
Attending: HOSPITALIST
Payer: MEDICARE

## 2023-11-09 ENCOUNTER — APPOINTMENT (OUTPATIENT)
Dept: MRI IMAGING | Facility: HOSPITAL | Age: 78
End: 2023-11-09
Attending: HOSPITALIST
Payer: MEDICARE

## 2023-11-09 ENCOUNTER — APPOINTMENT (OUTPATIENT)
Dept: CT IMAGING | Facility: HOSPITAL | Age: 78
DRG: 552 | End: 2023-11-09
Attending: HOSPITALIST
Payer: MEDICARE

## 2023-11-09 ENCOUNTER — APPOINTMENT (OUTPATIENT)
Dept: MRI IMAGING | Facility: HOSPITAL | Age: 78
DRG: 552 | End: 2023-11-09
Attending: HOSPITALIST
Payer: MEDICARE

## 2023-11-09 LAB
ANION GAP SERPL CALC-SCNC: 6 MMOL/L (ref 0–18)
BASOPHILS # BLD AUTO: 0.02 X10(3) UL (ref 0–0.2)
BASOPHILS NFR BLD AUTO: 0.1 %
BUN BLD-MCNC: 21 MG/DL (ref 9–23)
BUN/CREAT SERPL: 38.2 (ref 10–20)
CALCIUM BLD-MCNC: 9.6 MG/DL (ref 8.7–10.4)
CHLORIDE SERPL-SCNC: 109 MMOL/L (ref 98–112)
CO2 SERPL-SCNC: 27 MMOL/L (ref 21–32)
CREAT BLD-MCNC: 0.55 MG/DL
DEPRECATED RDW RBC AUTO: 42.5 FL (ref 35.1–46.3)
EGFRCR SERPLBLD CKD-EPI 2021: 95 ML/MIN/1.73M2 (ref 60–?)
EOSINOPHIL # BLD AUTO: 0 X10(3) UL (ref 0–0.7)
EOSINOPHIL NFR BLD AUTO: 0 %
ERYTHROCYTE [DISTWIDTH] IN BLOOD BY AUTOMATED COUNT: 11.7 % (ref 11–15)
GLUCOSE BLD-MCNC: 121 MG/DL (ref 70–99)
HCT VFR BLD AUTO: 35.3 %
HGB BLD-MCNC: 11.9 G/DL
IMM GRANULOCYTES # BLD AUTO: 0.06 X10(3) UL (ref 0–1)
IMM GRANULOCYTES NFR BLD: 0.4 %
LYMPHOCYTES # BLD AUTO: 0.78 X10(3) UL (ref 1–4)
LYMPHOCYTES NFR BLD AUTO: 5.4 %
MCH RBC QN AUTO: 33.8 PG (ref 26–34)
MCHC RBC AUTO-ENTMCNC: 33.7 G/DL (ref 31–37)
MCV RBC AUTO: 100.3 FL
MONOCYTES # BLD AUTO: 0.79 X10(3) UL (ref 0.1–1)
MONOCYTES NFR BLD AUTO: 5.5 %
NEUTROPHILS # BLD AUTO: 12.8 X10 (3) UL (ref 1.5–7.7)
NEUTROPHILS # BLD AUTO: 12.8 X10(3) UL (ref 1.5–7.7)
NEUTROPHILS NFR BLD AUTO: 88.6 %
OSMOLALITY SERPL CALC.SUM OF ELEC: 298 MOSM/KG (ref 275–295)
PLATELET # BLD AUTO: 349 10(3)UL (ref 150–450)
POTASSIUM SERPL-SCNC: 3.8 MMOL/L (ref 3.5–5.1)
RBC # BLD AUTO: 3.52 X10(6)UL
SODIUM SERPL-SCNC: 142 MMOL/L (ref 136–145)
VIT D+METAB SERPL-MCNC: 38.3 NG/ML (ref 30–100)
WBC # BLD AUTO: 14.5 X10(3) UL (ref 4–11)

## 2023-11-09 PROCEDURE — 99233 SBSQ HOSP IP/OBS HIGH 50: CPT | Performed by: HOSPITALIST

## 2023-11-09 PROCEDURE — 74176 CT ABD & PELVIS W/O CONTRAST: CPT | Performed by: HOSPITALIST

## 2023-11-09 PROCEDURE — 72148 MRI LUMBAR SPINE W/O DYE: CPT | Performed by: HOSPITALIST

## 2023-11-09 RX ORDER — POLYETHYLENE GLYCOL 3350 17 G/17G
17 POWDER, FOR SOLUTION ORAL
Status: DISCONTINUED | OUTPATIENT
Start: 2023-11-09 | End: 2023-11-11

## 2023-11-09 RX ORDER — TRAMADOL HYDROCHLORIDE 50 MG/1
50 TABLET ORAL EVERY 6 HOURS PRN
Status: DISCONTINUED | OUTPATIENT
Start: 2023-11-09 | End: 2023-11-10

## 2023-11-09 NOTE — PHYSICAL THERAPY NOTE
PT orders received and chart reviewed. Patient admitted with severe back pain radiating into the right leg. MRI of lumbar spine significant for bilateral sacral insufficiency fractures. Communicated with MD via [address] [place] [address] who is requesting hold off on PT evaluation at this time pending further medical work up/plan of care. Will initiate PT evaluation when medically appropriate.      Ligia Abel, PT, DPT  [facility]  Ext: [phone]

## 2023-11-09 NOTE — ED PROVIDER NOTES
Received sign out from Dr. Alfonzo Zheng. On reevaluation, patient is unable to ambulate secondary to pain. She has no neurologic deficits on my examination. No red flags or neurologic deficits to suggest cauda equina or epidural abscess. Provided with additional pain medication and admitted to and discussed with . Labs reviewed, unremarkable for acute findings on my interpretation. Pain management consultation also placed.

## 2023-11-09 NOTE — H&P
[facility]    PATIENT'S NAME: Evaristo Ochoa   ATTENDING PHYSICIAN: Chuyita Kevin MD   PATIENT ACCOUNT#:   [id number]    LOCATION:  [address]  MEDICAL RECORD #:   [id number]       YOB: 1945  ADMISSION DATE:       11/08/2023    HISTORY AND PHYSICAL EXAMINATION    CHIEF COMPLAINT:  Intractable lumbar radiculopathy. 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 Started having lumbar back pain radiating to her right posterior thigh 5 days ago. She was started on Medrol Dosepak without significant improvement of her pain. Today, came into the emergency department for evaluation. CBC showed white blood cell count of 15.0 with left shift. Chemistry was unremarkable. Multiple rounds of pain medications failed to improve her pain. She will be admitted to the hospital for further management. PAST MEDICAL HISTORY:  Degenerative joint disease of lumbar spine, hypertension, hyperlipidemia, generalized osteoarthritis. PAST SURGICAL HISTORY:  None. MEDICATIONS:  Please see medication reconciliation list.     ALLERGIES:  No known drug allergies. FAMILY HISTORY:  Mother had cerebrovascular accident and hypertension. Father had coronary artery disease and diabetes mellitus type 2. SOCIAL HISTORY:  Ex-tobacco user Social alcohol. No drug use. Lives with her family. Independent for basic activities of daily living. REVIEW OF SYSTEMS:  Pain radiates from the lumbosacral area on the right side toward her right posterior thigh. Does not cross the knee level. Patient cannot find a comfortable position. Specifically, sitting up or walking produces more pain. No change in urine or bowel habits. Other 12-point review of systems is negative. No recent trauma.         PHYSICAL EXAMINATION:    GENERAL:  Alert and oriented to time, place and person. Moderate distress. VITAL SIGNS:  Temperature 99.1, pulse 67, respiratory rate 16, blood pressure 160/68, pulse ox 97% on room air. HEENT:  Atraumatic. Oropharynx clear. Moist mucous membranes. Normal hard and soft palate. Eyes:  Anicteric sclerae. NECK:  Supple. No lymphadenopathy. Trachea midline. Full range of motion. LUNGS:  Clear to auscultation bilaterally. Normal respiratory effort. HEART:  Regular rate and rhythm. S1 and S2 auscultated. No murmur. ABDOMEN:  Soft, nondistended. No tenderness. Positive bowel sounds. EXTREMITIES:  No peripheral edema, clubbing or cyanosis. NEUROLOGIC:  No saddle paresthesia. Straight leg raising on the right side reproduces pain around 45 degrees. Neurologic exam otherwise intact. ASSESSMENT:    1.   Lumbar radiculopathy, possible distribution of L4-5, right, failed outpatient management. 2.   Hypertension. PLAN:  Patient will be admitted to general medical floor. IV Solu-Medrol. Increase gabapentin dose from 100 to 200 mg p.o. t.i.d.  Hold aspirin. Start her on pain medications. Obtain pain service consult and obtain MRI scan of the lumbar spine. Further recommendations to follow.      Dictated By Renetta Washington MD  d: 11/08/2023 19:26:30  t: 11/08/2023 19:43:54  Job [id number]  AB/

## 2023-11-09 NOTE — PLAN OF CARE
Problem: Patient Centered Care  Goal: Patient preferences are identified and integrated in the patient's plan of care  Description: Interventions:  - What would you like us to know as we care for you? I live at home alone. I have 2 dogs at home w/ me.   - Provide timely, complete, and accurate information to patient/family  - Incorporate patient and family knowledge, values, beliefs, and cultural backgrounds into the planning and delivery of care  - Encourage patient/family to participate in care and decision-making at the level they choose  - Honor patient and family perspectives and choices  Outcome: Progressing     Problem: Patient/Family Goals  Goal: Patient/Family Long Term Goal  Description: Patient's Long Term Goal: To feel better & return back home, w/ possible home health care services. Interventions:  -Pain Management Consult  -Administer IV Solu-Medrol as ordered  -Pain Management  -Diagnostic exams  -Routine blood work, report abnormal values to MD(s)  -Assist in Discharge planning  -Update pt/family on plan of care & give clinical updates. - See additional Care Plan goals for specific interventions  Outcome: Progressing  Goal: Patient/Family Short Term Goal  Description: Patient's Short Term Goal: To treat back pain, get MRI of back completed. Interventions:   -Pain Management Consult  -Administer IV Solu-Medrol as ordered  -Pain Management  -Diagnostic exams  -Routine blood work, report abnormal values to MD(s)  -Assist in Discharge planning  -Update pt/family on plan of care & give clinical updates.    - See additional Care Plan goals for specific interventions  Outcome: Progressing     Problem: PAIN - ADULT  Goal: Verbalizes/displays adequate comfort level or patient's stated pain goal  Description: INTERVENTIONS:  - Encourage pt to monitor pain and request assistance  - Assess pain using appropriate pain scale  - Administer analgesics based on type and severity of pain and evaluate response  - Implement non-pharmacological measures as appropriate and evaluate response  - Consider cultural and social influences on pain and pain management  - Manage/alleviate anxiety  - Utilize distraction and/or relaxation techniques  - Monitor for opioid side effects  - Notify MD/LIP if interventions unsuccessful or patient reports new pain  - Anticipate increased pain with activity and pre-medicate as appropriate  Outcome: Progressing     Problem: SAFETY ADULT - FALL  Goal: Free from fall injury  Description: INTERVENTIONS:  - Assess pt frequently for physical needs  - Identify cognitive and physical deficits and behaviors that affect risk of falls.   - [place] fall precautions as indicated by assessment.  - Educate pt/family on patient safety including physical limitations  - Instruct pt to call for assistance with activity based on assessment  - Modify environment to reduce risk of injury  - Provide assistive devices as appropriate  - Consider OT/PT consult to assist with strengthening/mobility  - Encourage toileting schedule  Outcome: Progressing     Problem: MUSCULOSKELETAL - ADULT  Goal: Return mobility to safest level of function  Description: INTERVENTIONS:  - Assess patient stability and activity tolerance for standing, transferring and ambulating w/ or w/o assistive devices  - Assist with transfers and ambulation using safe patient handling equipment as needed  - Ensure adequate protection for wounds/incisions during mobilization  - Obtain PT/OT consults as needed  - Advance activity as appropriate  - Communicate ordered activity level and limitations with patient/family  Outcome: Progressing  Goal: Maintain proper alignment of affected body part  Description: INTERVENTIONS:  - Support and protect limb and body alignment per provider's orders  - Instruct and reinforce with patient and family use of appropriate assistive device and precautions (e.g. spinal or hip dislocation precautions)  Outcome: Progressing     Problem: Impaired Functional Mobility  Goal: Achieve highest/safest level of mobility/gait  Description: Interventions:  - Assess patient's functional ability and stability  - Promote increasing activity/tolerance for mobility and gait  - Educate and engage patient/family in tolerated activity level and precautions    Outcome: Progressing

## 2023-11-10 ENCOUNTER — APPOINTMENT (OUTPATIENT)
Dept: GENERAL RADIOLOGY | Facility: HOSPITAL | Age: 78
End: 2023-11-10
Attending: STUDENT IN AN ORGANIZED HEALTH CARE EDUCATION/TRAINING PROGRAM
Payer: MEDICARE

## 2023-11-10 ENCOUNTER — APPOINTMENT (OUTPATIENT)
Dept: GENERAL RADIOLOGY | Facility: HOSPITAL | Age: 78
DRG: 552 | End: 2023-11-10
Attending: STUDENT IN AN ORGANIZED HEALTH CARE EDUCATION/TRAINING PROGRAM
Payer: MEDICARE

## 2023-11-10 LAB
ANION GAP SERPL CALC-SCNC: 3 MMOL/L (ref 0–18)
BUN BLD-MCNC: 32 MG/DL (ref 9–23)
BUN/CREAT SERPL: 44.4 (ref 10–20)
CALCIUM BLD-MCNC: 9.8 MG/DL (ref 8.7–10.4)
CHLORIDE SERPL-SCNC: 105 MMOL/L (ref 98–112)
CO2 SERPL-SCNC: 29 MMOL/L (ref 21–32)
CREAT BLD-MCNC: 0.72 MG/DL
DEPRECATED RDW RBC AUTO: 43.1 FL (ref 35.1–46.3)
EGFRCR SERPLBLD CKD-EPI 2021: 87 ML/MIN/1.73M2 (ref 60–?)
ERYTHROCYTE [DISTWIDTH] IN BLOOD BY AUTOMATED COUNT: 11.6 % (ref 11–15)
GLUCOSE BLD-MCNC: 149 MG/DL (ref 70–99)
HCT VFR BLD AUTO: 35.7 %
HGB BLD-MCNC: 11.8 G/DL
MCH RBC QN AUTO: 33.3 PG (ref 26–34)
MCHC RBC AUTO-ENTMCNC: 33.1 G/DL (ref 31–37)
MCV RBC AUTO: 100.8 FL
OSMOLALITY SERPL CALC.SUM OF ELEC: 294 MOSM/KG (ref 275–295)
PLATELET # BLD AUTO: 375 10(3)UL (ref 150–450)
POTASSIUM SERPL-SCNC: 4.8 MMOL/L (ref 3.5–5.1)
RBC # BLD AUTO: 3.54 X10(6)UL
SODIUM SERPL-SCNC: 137 MMOL/L (ref 136–145)
VIT D+METAB SERPL-MCNC: 37.4 NG/ML (ref 30–100)
WBC # BLD AUTO: 18.2 X10(3) UL (ref 4–11)

## 2023-11-10 PROCEDURE — 99233 SBSQ HOSP IP/OBS HIGH 50: CPT | Performed by: HOSPITALIST

## 2023-11-10 PROCEDURE — 3E0S33Z INTRODUCTION OF ANTI-INFLAMMATORY INTO EPIDURAL SPACE, PERCUTANEOUS APPROACH: ICD-10-PCS | Performed by: STUDENT IN AN ORGANIZED HEALTH CARE EDUCATION/TRAINING PROGRAM

## 2023-11-10 RX ORDER — TRAMADOL HYDROCHLORIDE 50 MG/1
50 TABLET ORAL EVERY 6 HOURS PRN
Status: DISCONTINUED | OUTPATIENT
Start: 2023-11-10 | End: 2023-11-12

## 2023-11-10 RX ORDER — METHYLPREDNISOLONE ACETATE 80 MG/ML
INJECTION, SUSPENSION INTRA-ARTICULAR; INTRALESIONAL; INTRAMUSCULAR; SOFT TISSUE AS NEEDED
Status: DISCONTINUED | OUTPATIENT
Start: 2023-11-10 | End: 2023-11-10 | Stop reason: HOSPADM

## 2023-11-10 RX ORDER — IOPAMIDOL 408 MG/ML
INJECTION, SOLUTION INTRATHECAL AS NEEDED
Status: DISCONTINUED | OUTPATIENT
Start: 2023-11-10 | End: 2023-11-10 | Stop reason: HOSPADM

## 2023-11-10 RX ORDER — ACETAMINOPHEN 325 MG/1
650 TABLET ORAL EVERY 8 HOURS
Status: DISCONTINUED | OUTPATIENT
Start: 2023-11-10 | End: 2023-11-12

## 2023-11-10 RX ORDER — HYDROCODONE BITARTRATE AND ACETAMINOPHEN 5; 325 MG/1; MG/1
1 TABLET ORAL EVERY 4 HOURS PRN
Status: DISCONTINUED | OUTPATIENT
Start: 2023-11-10 | End: 2023-11-12

## 2023-11-10 RX ORDER — LIDOCAINE HYDROCHLORIDE 10 MG/ML
INJECTION, SOLUTION EPIDURAL; INFILTRATION; INTRACAUDAL; PERINEURAL AS NEEDED
Status: DISCONTINUED | OUTPATIENT
Start: 2023-11-10 | End: 2023-11-10 | Stop reason: HOSPADM

## 2023-11-10 NOTE — H&P
Please refer to Chronic pain consult from 11/10/23  No interval changes. I discussed with the patient and/or legal representative the potential benefits, risks and side effects of this procedure; the likelihood of the patient achieving goals; and potential problems that might occur during recuperation. I discussed reasonable alternatives to the procedure, including risks, benefits and side effects related to the alternatives and risks related to not receiving this procedure. We will proceed with procedure as planned.      Harvey Chowdary MD, 11/10/23, 4:19 PM

## 2023-11-10 NOTE — OPERATIVE REPORT
Diagnosis: Lumbar Radiculopathy     Procedure(s):  L5/S1   Lumbar Interlaminar Epidural Steroid injection    Anesthesia Type: local only    Complications:   none    Follow-up Plan:   Clinic follow up as scheduled    Procedure: This patient was seen earlier for a comprehensive evaluation of their painful condition. After discussing treatment options, the patient elected to proceed with a lumbar interlaminar epidural steroid injection. Written, informed consent was obtained before the start of the procedure. The patient's history of present illness, past medical history (including current medications and allergies), and physical examination were reviewed with the patient immediately before the procedure, and it was confirmed directly with the patient that they desired to proceed. The patient ambulated to the operating room and was placed in the prone position with pressure points padded. A time out was performed, confirming the patient's identification, allergy status, the side(s) of the procedure, and the procedure(s) to be performed. All operators were wearing hats, masks and sterile gloves. The patient underwent ChloraPrep skin prep followed by sterile drape. The  interlaminar space was then identified by fluoroscopy and marked. The patient received 2% lidocaine for local anesthesia subcutaneously. An 20-gauge 9 cm Tuohy needle was then advanced under fluoroscopic guidance using a coaxial approach, loss of resistance to saline until the epidural space was entered. After negative aspiration, under live fluoroscopy in the anterior-posterior position, 1 mL of contrast solution was injected. This demonstrated appropriate epidural spread and was negative for intravascular or intrathecal flow. After confirmation of proper positioning of the needle within the epidural space 80 mg of DepoMedrol was injected with 3 mL of normal saline. The stylette was replaced and the needle was then removed.   Sterile bandage was applied over the puncture site. Following completion of the procedure, the patient was transported to the PACU, then was later discharged in stable condition.

## 2023-11-10 NOTE — CDS QUERY
Sally Win    Dear Doctor Bere Collazo:  Clinical information (provided below) suggests a condition associated with or contributing to the fracture. PLEASE (X) ALL THAT APPLY TO A FRACTURE INVOLVING:     THIS SECTION FOR PROVIDER DOCUMENTATION ONLY  (X  ) Pathological Sacral Fracture (please specify): Acute_________  Chronic____________osteoperosis?____  (  ) Traumatic Sacral Fracture Unrelated to another Condition   (  ) Other (please specify): ___________________________________      Clinical Indicators-  Unmanageable pain at home    Risk Factors-  Back pain with radiation  Spinal Stenosis  Chronic Lumbar Pain  - pain control with IV pain meds and lidocaine patch PN 11/9 Kelly  CT of abdomen and pelvis reveals multiple sacral fractures PN 11/9  Kelly  PMH - Osteopenia    Treatments-  Pain Control  Consult Pain Management  CT Abd/Pelvis            If you have any questions, please contact Clinical :  Gonzales Marino RN  BSN CDS  at [email or web address]. Thank You!   THIS FORM IS A PERMANENT PART OF THE MEDICAL RECORD

## 2023-11-10 NOTE — PLAN OF CARE
Received pt in stable condition. VSS, on RA. Tolerating scheduled medications, no complications noted. Pt on IV Solu-medrol Q 12 hrs. No pain voiced from pt at this time. Frequent rounding on pt. All fall & safety precautions in place for pt. Plan for Lumbar spine epidural injection tomorrow. Call light placed within pt.'s reach. Will continue to monitor for any new, acute changes. Problem: Patient Centered Care  Goal: Patient preferences are identified and integrated in the patient's plan of care  Description: Interventions:  - What would you like us to know as we care for you? I live at home alone. I have 2 dogs at home w/ me.   - Provide timely, complete, and accurate information to patient/family  - Incorporate patient and family knowledge, values, beliefs, and cultural backgrounds into the planning and delivery of care  - Encourage patient/family to participate in care and decision-making at the level they choose  - Honor patient and family perspectives and choices  Outcome: Progressing     Problem: Patient/Family Goals  Goal: Patient/Family Long Term Goal  Description: Patient's Long Term Goal: To feel better & return back home, w/ possible home health care services. Interventions:  -Pain Management Consult  -Administer IV Solu-Medrol as ordered  -Pain Management  -Diagnostic exams  -Routine blood work, report abnormal values to MD(s)  -Assist in Discharge planning  -Update pt/family on plan of care & give clinical updates. - See additional Care Plan goals for specific interventions  Outcome: Progressing  Goal: Patient/Family Short Term Goal  Description: Patient's Short Term Goal: To treat back pain, get MRI of back completed. Interventions:   -Pain Management Consult  -Administer IV Solu-Medrol as ordered  -Pain Management  -Diagnostic exams  -Routine blood work, report abnormal values to MD(s)  -Assist in Discharge planning  -Update pt/family on plan of care & give clinical updates.    - See additional Care Plan goals for specific interventions  Outcome: Progressing     Problem: PAIN - ADULT  Goal: Verbalizes/displays adequate comfort level or patient's stated pain goal  Description: INTERVENTIONS:  - Encourage pt to monitor pain and request assistance  - Assess pain using appropriate pain scale  - Administer analgesics based on type and severity of pain and evaluate response  - Implement non-pharmacological measures as appropriate and evaluate response  - Consider cultural and social influences on pain and pain management  - Manage/alleviate anxiety  - Utilize distraction and/or relaxation techniques  - Monitor for opioid side effects  - Notify MD/LIP if interventions unsuccessful or patient reports new pain  - Anticipate increased pain with activity and pre-medicate as appropriate  Outcome: Progressing     Problem: SAFETY ADULT - FALL  Goal: Free from fall injury  Description: INTERVENTIONS:  - Assess pt frequently for physical needs  - Identify cognitive and physical deficits and behaviors that affect risk of falls.   - [place] fall precautions as indicated by assessment.  - Educate pt/family on patient safety including physical limitations  - Instruct pt to call for assistance with activity based on assessment  - Modify environment to reduce risk of injury  - Provide assistive devices as appropriate  - Consider OT/PT consult to assist with strengthening/mobility  - Encourage toileting schedule  Outcome: Progressing     Problem: MUSCULOSKELETAL - ADULT  Goal: Return mobility to safest level of function  Description: INTERVENTIONS:  - Assess patient stability and activity tolerance for standing, transferring and ambulating w/ or w/o assistive devices  - Assist with transfers and ambulation using safe patient handling equipment as needed  - Ensure adequate protection for wounds/incisions during mobilization  - Obtain PT/OT consults as needed  - Advance activity as appropriate  - Communicate ordered activity level and limitations with patient/family  Outcome: Progressing  Goal: Maintain proper alignment of affected body part  Description: INTERVENTIONS:  - Support and protect limb and body alignment per provider's orders  - Instruct and reinforce with patient and family use of appropriate assistive device and precautions (e.g. spinal or hip dislocation precautions)  Outcome: Progressing     Problem: Impaired Functional Mobility  Goal: Achieve highest/safest level of mobility/gait  Description: Interventions:  - Assess patient's functional ability and stability  - Promote increasing activity/tolerance for mobility and gait  - Educate and engage patient/family in tolerated activity level and precautions    Outcome: Progressing     Problem: Impaired Activities of Daily Living  Goal: Achieve highest/safest level of independence in self care  Description: Interventions:  - Assess ability and encourage patient to participate in ADLs to maximize function  - Promote sitting position while performing ADLs such as feeding, grooming, and bathing  - Educate and encourage patient/family in tolerated functional activity level and precautions during self-care    Outcome: Progressing

## 2023-11-10 NOTE — PHYSICAL THERAPY NOTE
Chart reviewed for Pt eval. Patient still in severe pain in bed, getting up with RN staff at times but not able to move well. Spoke with MD Mendoza who would like therapy to hold until tomorrow morning given level of pain. Will plan to see tomorrow after LESI. SARAH Fuentes aware.

## 2023-11-10 NOTE — PLAN OF CARE
Patient A/OX4. Vital signs stable. Assist in room with ADLs. Chair fast minimal movement severe back pain with movement medicated and as ordered. Pain service at bedside today. Updated with plan of care. Fall precautions in place and call light within reach. Will continue to monitor  Problem: Patient Centered Care  Goal: Patient preferences are identified and integrated in the patient's plan of care  Description: Interventions:  - What would you like us to know as we care for you? I live at home alone. I have 2 dogs at home w/ me.   - Provide timely, complete, and accurate information to patient/family  - Incorporate patient and family knowledge, values, beliefs, and cultural backgrounds into the planning and delivery of care  - Encourage patient/family to participate in care and decision-making at the level they choose  - Honor patient and family perspectives and choices  Outcome: Progressing     Problem: Patient/Family Goals  Goal: Patient/Family Long Term Goal  Description: Patient's Long Term Goal: To feel better & return back home, w/ possible home health care services. Interventions:  -Pain Management Consult  -Administer IV Solu-Medrol as ordered  -Pain Management  -Diagnostic exams  -Routine blood work, report abnormal values to MD(s)  -Assist in Discharge planning  -Update pt/family on plan of care & give clinical updates. - See additional Care Plan goals for specific interventions  Outcome: Progressing  Goal: Patient/Family Short Term Goal  Description: Patient's Short Term Goal: To treat back pain, get MRI of back completed. Interventions:   -Pain Management Consult  -Administer IV Solu-Medrol as ordered  -Pain Management  -Diagnostic exams  -Routine blood work, report abnormal values to MD(s)  -Assist in Discharge planning  -Update pt/family on plan of care & give clinical updates.    - See additional Care Plan goals for specific interventions  Outcome: Progressing     Problem: PAIN - ADULT  Goal: Verbalizes/displays adequate comfort level or patient's stated pain goal  Description: INTERVENTIONS:  - Encourage pt to monitor pain and request assistance  - Assess pain using appropriate pain scale  - Administer analgesics based on type and severity of pain and evaluate response  - Implement non-pharmacological measures as appropriate and evaluate response  - Consider cultural and social influences on pain and pain management  - Manage/alleviate anxiety  - Utilize distraction and/or relaxation techniques  - Monitor for opioid side effects  - Notify MD/LIP if interventions unsuccessful or patient reports new pain  - Anticipate increased pain with activity and pre-medicate as appropriate  Outcome: Progressing     Problem: SAFETY ADULT - FALL  Goal: Free from fall injury  Description: INTERVENTIONS:  - Assess pt frequently for physical needs  - Identify cognitive and physical deficits and behaviors that affect risk of falls.   - [place] fall precautions as indicated by assessment.  - Educate pt/family on patient safety including physical limitations  - Instruct pt to call for assistance with activity based on assessment  - Modify environment to reduce risk of injury  - Provide assistive devices as appropriate  - Consider OT/PT consult to assist with strengthening/mobility  - Encourage toileting schedule  Outcome: Progressing     Problem: MUSCULOSKELETAL - ADULT  Goal: Return mobility to safest level of function  Description: INTERVENTIONS:  - Assess patient stability and activity tolerance for standing, transferring and ambulating w/ or w/o assistive devices  - Assist with transfers and ambulation using safe patient handling equipment as needed  - Ensure adequate protection for wounds/incisions during mobilization  - Obtain PT/OT consults as needed  - Advance activity as appropriate  - Communicate ordered activity level and limitations with patient/family  Outcome: Progressing  Goal: Maintain proper alignment of affected body part  Description: INTERVENTIONS:  - Support and protect limb and body alignment per provider's orders  - Instruct and reinforce with patient and family use of appropriate assistive device and precautions (e.g. spinal or hip dislocation precautions)  Outcome: Progressing     Problem: Impaired Functional Mobility  Goal: Achieve highest/safest level of mobility/gait  Description: Interventions:  - Assess patient's functional ability and stability  - Promote increasing activity/tolerance for mobility and gait  - Educate and engage patient/family in tolerated activity level and precautions  - Recommend use of sit-stand lift for transfers  Outcome: Progressing     Problem: Impaired Activities of Daily Living  Goal: Achieve highest/safest level of independence in self care  Description: Interventions:  - Assess ability and encourage patient to participate in ADLs to maximize function  - Promote sitting position while performing ADLs such as feeding, grooming, and bathing  - Educate and encourage patient/family in tolerated functional activity level and precautions during self-care  - Encourage patient to incorporate impaired side during daily activities to promote function  Outcome: Progressing

## 2023-11-11 LAB
ANION GAP SERPL CALC-SCNC: 4 MMOL/L (ref 0–18)
BUN BLD-MCNC: 22 MG/DL (ref 9–23)
BUN/CREAT SERPL: 37.3 (ref 10–20)
CALCIUM BLD-MCNC: 9.5 MG/DL (ref 8.7–10.4)
CHLORIDE SERPL-SCNC: 106 MMOL/L (ref 98–112)
CO2 SERPL-SCNC: 28 MMOL/L (ref 21–32)
CREAT BLD-MCNC: 0.59 MG/DL
DEPRECATED RDW RBC AUTO: 42.4 FL (ref 35.1–46.3)
EGFRCR SERPLBLD CKD-EPI 2021: 93 ML/MIN/1.73M2 (ref 60–?)
ERYTHROCYTE [DISTWIDTH] IN BLOOD BY AUTOMATED COUNT: 11.6 % (ref 11–15)
GLUCOSE BLD-MCNC: 101 MG/DL (ref 70–99)
HCT VFR BLD AUTO: 38.6 %
HGB BLD-MCNC: 12.8 G/DL
MCH RBC QN AUTO: 33.2 PG (ref 26–34)
MCHC RBC AUTO-ENTMCNC: 33.2 G/DL (ref 31–37)
MCV RBC AUTO: 100.3 FL
OSMOLALITY SERPL CALC.SUM OF ELEC: 289 MOSM/KG (ref 275–295)
PLATELET # BLD AUTO: 434 10(3)UL (ref 150–450)
POTASSIUM SERPL-SCNC: 4.2 MMOL/L (ref 3.5–5.1)
RBC # BLD AUTO: 3.85 X10(6)UL
SODIUM SERPL-SCNC: 138 MMOL/L (ref 136–145)
WBC # BLD AUTO: 12.9 X10(3) UL (ref 4–11)

## 2023-11-11 PROCEDURE — 99233 SBSQ HOSP IP/OBS HIGH 50: CPT | Performed by: HOSPITALIST

## 2023-11-11 RX ORDER — BISACODYL 5 MG/1
5 TABLET, DELAYED RELEASE ORAL 2 TIMES DAILY
Status: DISCONTINUED | OUTPATIENT
Start: 2023-11-11 | End: 2023-11-12

## 2023-11-11 NOTE — PLAN OF CARE
Problem: Patient Centered Care  Goal: Patient preferences are identified and integrated in the patient's plan of care  Description: Interventions:  - What would you like us to know as we care for you? I live at home alone. I have 2 dogs at home w/ me.   - Provide timely, complete, and accurate information to patient/family  - Incorporate patient and family knowledge, values, beliefs, and cultural backgrounds into the planning and delivery of care  - Encourage patient/family to participate in care and decision-making at the level they choose  - Honor patient and family perspectives and choices  Outcome: Progressing     Problem: Patient/Family Goals  Goal: Patient/Family Long Term Goal  Description: Patient's Long Term Goal: To feel better & return back home, w/ possible home health care services. Interventions:  -Pain Management Consult  -Administer IV Solu-Medrol as ordered  -Pain Management  -Diagnostic exams  -Routine blood work, report abnormal values to MD(s)  -Assist in Discharge planning  -Update pt/family on plan of care & give clinical updates. - See additional Care Plan goals for specific interventions  Outcome: Progressing  Goal: Patient/Family Short Term Goal  Description: Patient's Short Term Goal: To treat back pain, get MRI of back completed. Interventions:   -Pain Management Consult  -Administer IV Solu-Medrol as ordered  -Pain Management  -Diagnostic exams  -Routine blood work, report abnormal values to MD(s)  -Assist in Discharge planning  -Update pt/family on plan of care & give clinical updates.    - See additional Care Plan goals for specific interventions  Outcome: Progressing     Problem: PAIN - ADULT  Goal: Verbalizes/displays adequate comfort level or patient's stated pain goal  Description: INTERVENTIONS:  - Encourage pt to monitor pain and request assistance  - Assess pain using appropriate pain scale  - Administer analgesics based on type and severity of pain and evaluate response  - Implement non-pharmacological measures as appropriate and evaluate response  - Consider cultural and social influences on pain and pain management  - Manage/alleviate anxiety  - Utilize distraction and/or relaxation techniques  - Monitor for opioid side effects  - Notify MD/LIP if interventions unsuccessful or patient reports new pain  - Anticipate increased pain with activity and pre-medicate as appropriate  Outcome: Progressing     Problem: SAFETY ADULT - FALL  Goal: Free from fall injury  Description: INTERVENTIONS:  - Assess pt frequently for physical needs  - Identify cognitive and physical deficits and behaviors that affect risk of falls.   - [place] fall precautions as indicated by assessment.  - Educate pt/family on patient safety including physical limitations  - Instruct pt to call for assistance with activity based on assessment  - Modify environment to reduce risk of injury  - Provide assistive devices as appropriate  - Consider OT/PT consult to assist with strengthening/mobility  - Encourage toileting schedule  Outcome: Progressing     Problem: MUSCULOSKELETAL - ADULT  Goal: Return mobility to safest level of function  Description: INTERVENTIONS:  - Assess patient stability and activity tolerance for standing, transferring and ambulating w/ or w/o assistive devices  - Assist with transfers and ambulation using safe patient handling equipment as needed  - Ensure adequate protection for wounds/incisions during mobilization  - Obtain PT/OT consults as needed  - Advance activity as appropriate  - Communicate ordered activity level and limitations with patient/family  Outcome: Progressing  Goal: Maintain proper alignment of affected body part  Description: INTERVENTIONS:  - Support and protect limb and body alignment per provider's orders  - Instruct and reinforce with patient and family use of appropriate assistive device and precautions (e.g. spinal or hip dislocation precautions)  Outcome: Progressing     Problem: Impaired Functional Mobility  Goal: Achieve highest/safest level of mobility/gait  Description: Interventions:  - Assess patient's functional ability and stability  - Promote increasing activity/tolerance for mobility and gait  - Educate and engage patient/family in tolerated activity level and precautions  - Recommend use of  RW for transfers and ambulation  Outcome: Progressing     Problem: Impaired Activities of Daily Living  Goal: Achieve highest/safest level of independence in self care  Description: Interventions:  - Assess ability and encourage patient to participate in ADLs to maximize function  - Promote sitting position while performing ADLs such as feeding, grooming, and bathing  - Educate and encourage patient/family in tolerated functional activity level and precautions during self-care  - Provide support for low back pain  Outcome: Progressing

## 2023-11-11 NOTE — CM/SW NOTE
11/11/23 1500   /SW Referral Data   Referral Source Social Work (self-referral)   Reason for Referral Discharge planning   Informant EMR; Other   Medical Hx   Does patient have an established PCP? Yes  (Dr. Aubree Hernandez)   Patient [address]   Number of Levels in Home 2   Number of Stair in Home 16   Patient lives with Alone   Patient Status Prior to Admission   Independent with ADLs and Mobility Yes   Services in place prior to admission DME/Supplies at home   Type of DME/Supplies [employer]ator Walker   Discharge Needs   Anticipated D/C needs Home health care   Choice of Post-Acute Provider   Informed patient of right to choose their preferred provider Yes   Patient/family choice Pending referral review     Therapy recommending HHC, PT/OT. F2F orders placed for home RN/PT/OT.  requested [address] to send [place] [address] referrals for review. Will need to follow up w/ pt regarding choice.      Caregiver resource list given, per pt request.    PLAN: Home w/ HHC, agency pending review and pt choice    Georgina Orlando, [address] [facility]

## 2023-11-11 NOTE — CM/SW NOTE
Department  notified of request for MARIA E vincent referrals started. Assigned CM/SW to follow up with pt/family on further discharge planning.      [place] PRECIADO [facility]

## 2023-11-11 NOTE — PHYSICAL THERAPY NOTE
PHYSICAL THERAPY EVALUATION - INPATIENT     Room Number: 544/544-A  Evaluation Date: 11/11/2023  Type of Evaluation: Initial   Physician Order: PT Eval and Treat    Presenting Problem: Back pain with radiation, bilateral sacral insufficiency fx, S/P L5-S1 LESI 11/10/23  Co-Morbidities : DJD, HTN, HLD, OA, osteopenia  Reason for Therapy: Mobility Dysfunction and Discharge Planning    PHYSICAL THERAPY ASSESSMENT     Patient is a 69year old female admitted 11/8/2023 for back pain with radiation S/P LESI 11/10/23. Pt with bilateral sacral insufficiency fx, cleared for therapy following LESI. Patient received semi-fowlers in bed, agreeable to physical therapy evaluation. Vital signs monitored as noted below, no adverse symptoms and patient stable during session. Education with patient provided on Spine precautions, Physical therapy plan of care, physiological benefits of out of bed mobility, and home safety and pain management . Pt demonstrating understanding of spinal precautions with minimal verbal reminders throughout. Pt tolerating household distances with rolling walker, able to perform stairs required to enter home. Pt lives alone and expresses interest in having temporary caregiver assist with IADLs.      MOBILITY:  ROLLING: contact guard assist - log roll  SUPINE TO SIT: contact guard assist - log roll  SIT TO STAND: contact guard assist - progressed to supervision  STAND TO SIT: contact guard assist   GAIT  Gait Assistance: Contact guard assist;Supervision  Distance (ft): 120'+20'x2  Assistive Device: Rolling walker  Pattern: Within Functional Limits (narrow ADAN with tentative steps, decreased step length, slow-moderate pace progressing to step-through pattern, light use of BUEs through RW, no LOB)   STAIR NEGOTIATION: standby assist - 8 stairs with bilateral handrails, reciprocal pattern, verbally educated patient on performing stoop step with rolling walker     Patient is currently functioning below baseline with bed mobility, transfers, gait, and stair negotiation as a result of the following impairments: pain, limited spinal ROM, and anxiety. Next session anticipate to progress bed mobility, gait, and stair negotiation. Patient history and/or personal factors that may impact the plan of care include home accessibility concerns, limited social support, co-morbidities (DJD, HTN, HLD, OA, osteopenia) affecting activity tolerance, medical status, pain levels, and longstanding history of pain. Based on the physical therapy examination of the noted systems and functional activity/participation limitations, the patient presentation is evolving given the patient demonstrates worsening of previously stable condition and demonstrates worsening of co-morbidities. Patient would benefit from continued skilled physical therapy interventions to maximize patient safety and functional independence. Updated nurse on results of session, patient left seated in bedside chair, all lines intact, all needs met with call light in reach. The patient's Approx Degree of Impairment: 46.58% has been calculated based on documentation in the [facility] '6 clicks' Inpatient Basic Mobility Short Form. Research supports that patients with this level of impairment may benefit from home with home-health PT. Patient will benefit from continued IP PT services to address these deficits in preparation for discharge. DISCHARGE RECOMMENDATIONS  PT Discharge Recommendations: Intermittent Supervision;Home with home health PT/OT    PLAN  PT Treatment Plan: Body mechanics; Energy conservation;Patient education;Gait training;Strengthening;Stair training;Transfer training;Balance training  Rehab Potential : Good  Frequency (Obs): 5x/week       PHYSICAL THERAPY MEDICAL/SOCIAL HISTORY     Problem List  Principal Problem:    Back pain with radiation      HOME SITUATION  Home Situation  Type of Home: [facility]  Home Layout: Two level  Stairs to Enter : 1  Railing: No  Stairs to Bedroom: 16  Railing: Yes  Lives With: Alone  Drives: Yes  Patient Owned Equipment: Rolling walker     Prior Level of [place]: independent no assistive device, son lives out of state with limited social support    SUBJECTIVE  \"I don't have too much pain right now. \"    PHYSICAL THERAPY EXAMINATION     OBJECTIVE  Precautions: Spine  Fall Risk: Standard fall risk    WEIGHT BEARING RESTRICTION  None    PAIN ASSESSMENT  Ratin  Location: low back down to back of legs, okay at rest  Management Techniques: Body mechanics; Activity promotion    COGNITION  Overall Cognitive Status:  WFL - within functional limits  Memory:  decreased recall of precautions    RANGE OF MOTION AND STRENGTH ASSESSMENT  Upper extremity ROM and strength are within functional limits  BUEs within spinal precautions   Lower extremity ROM is within functional limits  BLEs within spinal precautions   Lower extremity strength is within functional limits  BLEs within spinal precautions     BALANCE  Static Sitting: Normal  Dynamic Sitting: Normal  Static Standing: Good  Dynamic Standing: Good    NEUROLOGICAL FINDINGS        Coordination - Rapid Alternating Movement: Symmetrical  Sensation: WNL          ACTIVITY TOLERANCE  Pulse: 67  Heart Rate Source: Monitor     BP: 123/71  BP Location: Right arm  BP Method: Automatic  Patient Position: Lying    O2 WALK  Oxygen Therapy  SPO2% on Room Air at Rest: 96    AM-PAC '6-Clicks' INPATIENT SHORT FORM - BASIC MOBILITY  How much difficulty does the patient currently have. .. Patient Difficulty: Turning over in bed (including adjusting bedclothes, sheets and blankets)?: A Little   Patient Difficulty: Sitting down on and standing up from a chair with arms (e.g., wheelchair, bedside commode, etc.): A Little   Patient Difficulty: Moving from lying on back to sitting on the side of the bed?: A Little   How much help from another person does the patient currently need. ..    Help from Another: Moving to and from a bed to a chair (including a wheelchair)?: A Little   Help from Another: Need to walk in hospital room?: A Little   Help from Another: Climbing 3-5 steps with a railing?: A Little     AM-PAC Score:  Raw Score: 18   Approx Degree of Impairment: 46.58%   Standardized Score (AM-PAC Scale): 43.63   CMS Modifier (G-Code): CK    FUNCTIONAL ABILITY STATUS  Functional Mobility/Gait Assessment  Gait Assistance: Contact guard assist;Supervision  Distance (ft): 120'+20'x2  Assistive Device: Rolling walker  Pattern: Within Functional Limits (narrow ADAN with tentative steps, decreased step length, slow-moderate pace progressing to step-through pattern, light use of BUEs through RW, no LOB)  Stairs: Stairs  How Many Stairs: 8  Device: 2 Rails  Assist: Contact guard assist  Pattern: Ascend and Descend  Ascend and Descend : Reciprocal      Exercise/Education Provided:  Bed mobility  Body mechanics  Energy conservation  Functional activity tolerated  Gait training  Posture  ROM  Strengthening  Transfer training    Patient End of Session: Up in chair;Needs met;Call light within reach;RN aware of session/findings; All patient questions and concerns addressed    CURRENT GOALS    Goals to be met by: 11/20/23  Patient Goal Patient's self-stated goal is: return home safely   Goal #1 Patient is able to demonstrate supine - sit EOB @ level: modified independent     Goal #1   Current Status    Goal #2 Patient is able to demonstrate transfers EOB to/from [facility] at assistance level: modified independent with walker - rolling     Goal #2  Current Status    Goal #3 Patient is able to ambulate 50 feet with assist device: walker - rolling at assistance level: modified independent   Goal #3   Current Status    Goal #4 Patient will negotiate 12 stairs/one curb w/ assistive device and supervision   Goal #4   Current Status    Goal #5 Patient to demonstrate independence with home activity/exercise instructions provided to patient in preparation for discharge.    Goal #5   Current Status    Goal #6    Goal #6  Current Status      Patient Evaluation Complexity Level:  History Moderate - 1 or 2 personal factors and/or co-morbidities   Examination of body systems Moderate - addressing a total of 3 or more elements   Clinical Presentation Moderate - Evolving   Clinical Decision Making Moderate Complexity       Gait Training: 15 minutes  Therapeutic Activity: 18 minutes      Beverly Redman PT, DPT  [facility]  Inpatient Rehabilitation  Physical Therapy  [phone]

## 2023-11-11 NOTE — PLAN OF CARE
Received pt in stable condition. VSS, on RA. Tolerating scheduled medications, no complications noted. Frequent rounding on pt. All fall & safety precautions in place for pt. Pt had Lumbar spine epidural injection completed today. Plan for PT/OT to work w/ pt tomorrow, post lumbar spine injection. Call light placed within pt.'s reach. Will continue to monitor for any new, acute changes. Problem: Patient Centered Care  Goal: Patient preferences are identified and integrated in the patient's plan of care  Description: Interventions:  - What would you like us to know as we care for you? I live at home alone. I have 2 dogs at home w/ me.   - Provide timely, complete, and accurate information to patient/family  - Incorporate patient and family knowledge, values, beliefs, and cultural backgrounds into the planning and delivery of care  - Encourage patient/family to participate in care and decision-making at the level they choose  - Honor patient and family perspectives and choices  Outcome: Progressing     Problem: Patient/Family Goals  Goal: Patient/Family Long Term Goal  Description: Patient's Long Term Goal: To feel better & return back home, w/ possible home health care services. Interventions:  -Pain Management Consult  -Administer IV Solu-Medrol as ordered  -Pain Management  -Diagnostic exams  -Routine blood work, report abnormal values to MD(s)  -Assist in Discharge planning  -Update pt/family on plan of care & give clinical updates. - See additional Care Plan goals for specific interventions  Outcome: Progressing  Goal: Patient/Family Short Term Goal  Description: Patient's Short Term Goal: To treat back pain, get MRI of back completed. Interventions:   -Pain Management Consult  -Administer IV Solu-Medrol as ordered  -Pain Management  -Diagnostic exams  -Routine blood work, report abnormal values to MD(s)  -Assist in Discharge planning  -Update pt/family on plan of care & give clinical updates.    - See additional Care Plan goals for specific interventions  Outcome: Progressing     Problem: PAIN - ADULT  Goal: Verbalizes/displays adequate comfort level or patient's stated pain goal  Description: INTERVENTIONS:  - Encourage pt to monitor pain and request assistance  - Assess pain using appropriate pain scale  - Administer analgesics based on type and severity of pain and evaluate response  - Implement non-pharmacological measures as appropriate and evaluate response  - Consider cultural and social influences on pain and pain management  - Manage/alleviate anxiety  - Utilize distraction and/or relaxation techniques  - Monitor for opioid side effects  - Notify MD/LIP if interventions unsuccessful or patient reports new pain  - Anticipate increased pain with activity and pre-medicate as appropriate  Outcome: Progressing     Problem: SAFETY ADULT - FALL  Goal: Free from fall injury  Description: INTERVENTIONS:  - Assess pt frequently for physical needs  - Identify cognitive and physical deficits and behaviors that affect risk of falls.   - [place] fall precautions as indicated by assessment.  - Educate pt/family on patient safety including physical limitations  - Instruct pt to call for assistance with activity based on assessment  - Modify environment to reduce risk of injury  - Provide assistive devices as appropriate  - Consider OT/PT consult to assist with strengthening/mobility  - Encourage toileting schedule  Outcome: Progressing     Problem: MUSCULOSKELETAL - ADULT  Goal: Return mobility to safest level of function  Description: INTERVENTIONS:  - Assess patient stability and activity tolerance for standing, transferring and ambulating w/ or w/o assistive devices  - Assist with transfers and ambulation using safe patient handling equipment as needed  - Ensure adequate protection for wounds/incisions during mobilization  - Obtain PT/OT consults as needed  - Advance activity as appropriate  - Communicate ordered activity level and limitations with patient/family  Outcome: Not Progressing  Goal: Maintain proper alignment of affected body part  Description: INTERVENTIONS:  - Support and protect limb and body alignment per provider's orders  - Instruct and reinforce with patient and family use of appropriate assistive device and precautions (e.g. spinal or hip dislocation precautions)  Outcome: Progressing     Problem: Impaired Functional Mobility  Goal: Achieve highest/safest level of mobility/gait  Description: Interventions:  - Assess patient's functional ability and stability  - Promote increasing activity/tolerance for mobility and gait  - Educate and engage patient/family in tolerated activity level and precautions    Outcome: Not Progressing     Problem: Impaired Activities of Daily Living  Goal: Achieve highest/safest level of independence in self care  Description: Interventions:  - Assess ability and encourage patient to participate in ADLs to maximize function  - Promote sitting position while performing ADLs such as feeding, grooming, and bathing  - Educate and encourage patient/family in tolerated functional activity level and precautions during self-care    Outcome: Not Progressing

## 2023-11-12 VITALS
BODY MASS INDEX: 19 KG/M2 | OXYGEN SATURATION: 97 % | HEART RATE: 69 BPM | DIASTOLIC BLOOD PRESSURE: 70 MMHG | RESPIRATION RATE: 16 BRPM | SYSTOLIC BLOOD PRESSURE: 154 MMHG | WEIGHT: 102.5 LBS | TEMPERATURE: 98 F

## 2023-11-12 PROCEDURE — 99239 HOSP IP/OBS DSCHRG MGMT >30: CPT | Performed by: HOSPITALIST

## 2023-11-12 NOTE — DISCHARGE INSTRUCTIONS
[address], [address] [place] [address]  Phone: [phone]  Fax: [phone]

## 2023-11-12 NOTE — CM/SW NOTE
11/12/23 0900   Discharge disposition   Expected discharge disposition Home-Health   Post Acute Care Provider [address]  ([address] [facility])   Discharge transportation Private car     [facility] able to [facility] pt, res via [facility] and notified of DC today. [address]  [place], [address]  Phone: [phone]  Fax: [phone]    Mustapha Burris.  Mariely Moya RN, BSN  Nurse   [phone]

## 2023-11-12 NOTE — PLAN OF CARE
Received pt in stable condition. VSS, on RA. Tolerating scheduled medications, no complications noted. PRN Tramadol given for back & leg pain management. Frequent rounding on pt. All fall & safety precautions in place for pt. Possible discharge back home tomorrow, pending home health agency. Call light placed within pt.'s reach. Will continue to monitor for any new, acute changes. Problem: Patient Centered Care  Goal: Patient preferences are identified and integrated in the patient's plan of care  Description: Interventions:  - What would you like us to know as we care for you? I live at home alone. I have 2 dogs at home w/ me.   - Provide timely, complete, and accurate information to patient/family  - Incorporate patient and family knowledge, values, beliefs, and cultural backgrounds into the planning and delivery of care  - Encourage patient/family to participate in care and decision-making at the level they choose  - Honor patient and family perspectives and choices  Outcome: Progressing     Problem: Patient/Family Goals  Goal: Patient/Family Long Term Goal  Description: Patient's Long Term Goal: To feel better & return back home, w/ possible home health care services. Interventions:  -Pain Management Consult  -Administer IV Solu-Medrol as ordered  -Pain Management  -Diagnostic exams  -Routine blood work, report abnormal values to MD(s)  -Assist in Discharge planning  -Update pt/family on plan of care & give clinical updates. - See additional Care Plan goals for specific interventions  Outcome: Progressing  Goal: Patient/Family Short Term Goal  Description: Patient's Short Term Goal: To treat back pain, get MRI of back completed. Interventions:   -Pain Management Consult  -Administer IV Solu-Medrol as ordered  -Pain Management  -Diagnostic exams  -Routine blood work, report abnormal values to MD(s)  -Assist in Discharge planning  -Update pt/family on plan of care & give clinical updates.    - See additional Care Plan goals for specific interventions  Outcome: Progressing     Problem: PAIN - ADULT  Goal: Verbalizes/displays adequate comfort level or patient's stated pain goal  Description: INTERVENTIONS:  - Encourage pt to monitor pain and request assistance  - Assess pain using appropriate pain scale  - Administer analgesics based on type and severity of pain and evaluate response  - Implement non-pharmacological measures as appropriate and evaluate response  - Consider cultural and social influences on pain and pain management  - Manage/alleviate anxiety  - Utilize distraction and/or relaxation techniques  - Monitor for opioid side effects  - Notify MD/LIP if interventions unsuccessful or patient reports new pain  - Anticipate increased pain with activity and pre-medicate as appropriate  Outcome: Progressing     Problem: SAFETY ADULT - FALL  Goal: Free from fall injury  Description: INTERVENTIONS:  - Assess pt frequently for physical needs  - Identify cognitive and physical deficits and behaviors that affect risk of falls.   - [place] fall precautions as indicated by assessment.  - Educate pt/family on patient safety including physical limitations  - Instruct pt to call for assistance with activity based on assessment  - Modify environment to reduce risk of injury  - Provide assistive devices as appropriate  - Consider OT/PT consult to assist with strengthening/mobility  - Encourage toileting schedule  Outcome: Progressing     Problem: MUSCULOSKELETAL - ADULT  Goal: Return mobility to safest level of function  Description: INTERVENTIONS:  - Assess patient stability and activity tolerance for standing, transferring and ambulating w/ or w/o assistive devices  - Assist with transfers and ambulation using safe patient handling equipment as needed  - Ensure adequate protection for wounds/incisions during mobilization  - Obtain PT/OT consults as needed  - Advance activity as appropriate  - Communicate ordered activity level and limitations with patient/family  Outcome: Progressing  Goal: Maintain proper alignment of affected body part  Description: INTERVENTIONS:  - Support and protect limb and body alignment per provider's orders  - Instruct and reinforce with patient and family use of appropriate assistive device and precautions (e.g. spinal or hip dislocation precautions)  Outcome: Progressing     Problem: Impaired Functional Mobility  Goal: Achieve highest/safest level of mobility/gait  Description: Interventions:  - Assess patient's functional ability and stability  - Promote increasing activity/tolerance for mobility and gait  - Educate and engage patient/family in tolerated activity level and precautions    Outcome: Progressing     Problem: Impaired Activities of Daily Living  Goal: Achieve highest/safest level of independence in self care  Description: Interventions:  - Assess ability and encourage patient to participate in ADLs to maximize function  - Promote sitting position while performing ADLs such as feeding, grooming, and bathing  - Educate and encourage patient/family in tolerated functional activity level and precautions during self-care    Outcome: Progressing

## 2023-11-12 NOTE — DISCHARGE SUMMARY
[facility]    Discharge Summary     Patient Status:  Inpatient    1945 MRN [id number]   Location [facility] 5SW/SE Attending Nuvia Ryan MD   [address] 4 PCP Marcelina Cazares MD     Date of Admission: 2023   Date of Discharge: 2023    Hospital Discharge Diagnoses: Acute on chronic Back Pain    Lace+ Score: 54  59-90 High Risk  29-58 Medium Risk  0-28   Low Risk. TCM Follow-Up Recommendation:  LACE > 58: High Risk of readmission after discharge from the hospital.        Admitting Diagnosis: Back pain with radiation [M54.9]    Disposition: Home    Discharge Diagnosis: . Principal Problem:    Back pain with radiation      Hospital Course:   Reason for Admission:     Discharge Physical Exam:   Physical Exam:    General: No acute distress. Respiratory: Clear to auscultation bilaterally. No wheezes. No rhonchi. Cardiovascular: S1, S2. Regular rate and rhythm. No murmurs, rubs or gallops. Abdomen: Soft, nontender, nondistended. Positive bowel sounds. No rebound or guarding. Neurologic: No focal neurological deficits. Musculoskeletal: Moves all extremities.     Hospital Course:     Back pain with radiation     Spinal Stenosis     Chronic Lumbar Pain   - pain control with IV  pain meds and lidocaine patch   - MRI of the lumbar spine reviewed  - CT of abdomen and pelvis reveals multiple sacral fractures   - continue norco 5/325mg q 4 hours prn for pain- script printed by pain medicine specialist   - tylenol 650 mg q 6 hours scheduled  - ketoralac ordered by Pain as well   - lidocaine patch  - status post steroid injection 11/10 at L5-S1 with local anesthesia   - Pain service consult appreciated  - check vitamin D level- WNL  - IV solumedrol - stop at disharge   - PT/OT after steroid injection- home with home PT     Leukocytosis  - mostly reactive and secondary to steroids   - resolved     HTN  Continue home med            Quality:  DVT Prophylaxis: SCDs  CODE status: Full    Complications: none      Surgical Procedures       Case IDs Date Procedure Surgeon Location Status    [id number] 11/10/23 LUMBAR EPIDURAL STEROID INJECTION Tanika Medley  [facility]              Discharge Plan:   Discharge Condition: Stable    Current Discharge Medication List        New Orders    Details   lidocaine-menthol 4-1 % External Patch Place 1 patch onto the skin daily. HYDROcodone-acetaminophen 5-325 MG Oral Tab Take 1 tablet by mouth every 6 (six) hours as needed. Ketorolac Tromethamine 10 MG Oral Tab Take 1 tablet (10 mg total) by mouth every 6 (six) hours as needed. Home Meds - Unchanged    Details   gabapentin 300 MG Oral Cap Take 1 capsule (300 mg total) by mouth 3 (three) times daily. amLODIPine 2.5 MG Oral Tab Take 1 tablet (2.5 mg total) by mouth daily. triamcinolone 0.1 % External Cream Apply 1 Application topically 2 (two) times daily as needed. aspirin 81 MG Oral Tab Take 1 tablet (81 mg total) by mouth daily. Discharge Diet: General diet healthy    Discharge Activity: As tolerated       Discharge Medications        START taking these medications        Instructions Prescription details   HYDROcodone-acetaminophen 5-325 MG Tabs  Commonly known as: Norco      Take 1 tablet by mouth every 6 (six) hours as needed. Stop taking on: November 15, 2023  Quantity: 10 tablet  Refills: 0     Ketorolac Tromethamine 10 MG Tabs  Commonly known as: TORADOL      Take 1 tablet (10 mg total) by mouth every 6 (six) hours as needed. Stop taking on: November 15, 2023  Quantity: 20 tablet  Refills: 0     lidocaine-menthol 4-1 % Ptch  Start taking on: November 13, 2023      Place 1 patch onto the skin daily. Quantity: 30 patch  Refills: 0            CONTINUE taking these medications        Instructions Prescription details   amLODIPine 2.5 MG Tabs  Commonly known as: Norvasc      Take 1 tablet (2.5 mg total) by mouth daily. Quantity: 30 tablet  Refills: 11     aspirin 81 MG Tabs      Take 1 tablet (81 mg total) by mouth daily. Refills: 0     gabapentin 300 MG Caps  Commonly known as: Neurontin      Take 1 capsule (300 mg total) by mouth 3 (three) times daily. Quantity: 90 capsule  Refills: 2     triamcinolone 0.1 % Crea  Commonly known as: Kenalog      Apply 1 Application topically 2 (two) times daily as needed. Quantity: 60 g  Refills: 0            STOP taking these medications      lidocaine 5 % Ptch  Commonly known as: Lidoderm        methylPREDNISolone 4 MG Tbpk  Commonly known as: Medrol                  Where to Get Your Medications        These medications were sent to [facility], [address] [address] [phone], [address] [place] [address]      Phone: [phone]   HYDROcodone-acetaminophen 5-325 MG Tabs  Ketorolac Tromethamine 10 MG Tabs  lidocaine-menthol 4-1 % Ptch         Follow up: Follow-up Information       Linnette Nielsen DO Follow up. Specialties: Physical Medicine, PHYSIATRY  Contact information:  [address] [place] [address] Road  [phone]                             Follow up Labs and imaging:          Other Discharge Instructions:         [address] [facility] [address] [facility] [address], [address]  Phone: [phone]  Fax: [phone]         Time spent:  > 30 minutes    Jez Mead MD  11/12/2023

## 2023-11-12 NOTE — PLAN OF CARE
Problem: Patient Centered Care  Goal: Patient preferences are identified and integrated in the patient's plan of care  Description: Interventions:  - What would you like us to know as we care for you? I live at home alone. I have 2 dogs at home w/ me.   - Provide timely, complete, and accurate information to patient/family  - Incorporate patient and family knowledge, values, beliefs, and cultural backgrounds into the planning and delivery of care  - Encourage patient/family to participate in care and decision-making at the level they choose  - Honor patient and family perspectives and choices  Outcome: Adequate for Discharge     Problem: Patient/Family Goals  Goal: Patient/Family Long Term Goal  Description: Patient's Long Term Goal: To feel better & return back home, w/ possible home health care services. Interventions:  -Pain Management Consult  -Administer IV Solu-Medrol as ordered  -Pain Management  -Diagnostic exams  -Routine blood work, report abnormal values to MD(s)  -Assist in Discharge planning  -Update pt/family on plan of care & give clinical updates. - See additional Care Plan goals for specific interventions  Outcome: Adequate for Discharge  Goal: Patient/Family Short Term Goal  Description: Patient's Short Term Goal: To treat back pain, get MRI of back completed. Interventions:   -Pain Management Consult  -Administer IV Solu-Medrol as ordered  -Pain Management  -Diagnostic exams  -Routine blood work, report abnormal values to MD(s)  -Assist in Discharge planning  -Update pt/family on plan of care & give clinical updates.    - See additional Care Plan goals for specific interventions  Outcome: Adequate for Discharge     Problem: PAIN - ADULT  Goal: Verbalizes/displays adequate comfort level or patient's stated pain goal  Description: INTERVENTIONS:  - Encourage pt to monitor pain and request assistance  - Assess pain using appropriate pain scale  - Administer analgesics based on type and severity of pain and evaluate response  - Implement non-pharmacological measures as appropriate and evaluate response  - Consider cultural and social influences on pain and pain management  - Manage/alleviate anxiety  - Utilize distraction and/or relaxation techniques  - Monitor for opioid side effects  - Notify MD/LIP if interventions unsuccessful or patient reports new pain  - Anticipate increased pain with activity and pre-medicate as appropriate  Outcome: Adequate for Discharge     Problem: SAFETY ADULT - FALL  Goal: Free from fall injury  Description: INTERVENTIONS:  - Assess pt frequently for physical needs  - Identify cognitive and physical deficits and behaviors that affect risk of falls.   - [place] fall precautions as indicated by assessment.  - Educate pt/family on patient safety including physical limitations  - Instruct pt to call for assistance with activity based on assessment  - Modify environment to reduce risk of injury  - Provide assistive devices as appropriate  - Consider OT/PT consult to assist with strengthening/mobility  - Encourage toileting schedule  Outcome: Adequate for Discharge     Problem: MUSCULOSKELETAL - ADULT  Goal: Return mobility to safest level of function  Description: INTERVENTIONS:  - Assess patient stability and activity tolerance for standing, transferring and ambulating w/ or w/o assistive devices  - Assist with transfers and ambulation using safe patient handling equipment as needed  - Ensure adequate protection for wounds/incisions during mobilization  - Obtain PT/OT consults as needed  - Advance activity as appropriate  - Communicate ordered activity level and limitations with patient/family  Outcome: Adequate for Discharge  Goal: Maintain proper alignment of affected body part  Description: INTERVENTIONS:  - Support and protect limb and body alignment per provider's orders  - Instruct and reinforce with patient and family use of appropriate assistive device and precautions (e.g. spinal or hip dislocation precautions)  Outcome: Adequate for Discharge     Problem: Impaired Functional Mobility  Goal: Achieve highest/safest level of mobility/gait  Description: Interventions:  - Assess patient's functional ability and stability  - Promote increasing activity/tolerance for mobility and gait  - Educate and engage patient/family in tolerated activity level and precautions  Outcome: Adequate for Discharge     Problem: Impaired Activities of Daily Living  Goal: Achieve highest/safest level of independence in self care  Description: Interventions:  - Assess ability and encourage patient to participate in ADLs to maximize function  - Promote sitting position while performing ADLs such as feeding, grooming, and bathing  - Educate and encourage patient/family in tolerated functional activity level and precautions during self-care  Outcome: Adequate for Discharge    Patient okay to be discharged per MD order, all discharge instructions discussed with pt at bedside, all questions answered, peripheral IV removed. Pt assisted to hospital entrance via wheelchair by staff, pt discharged with all belongings and picked up by family/friend, stable at time of discharge.

## 2023-11-12 NOTE — PLAN OF CARE
Problem: Patient Centered Care  Goal: Patient preferences are identified and integrated in the patient's plan of care  Description: Interventions:  - What would you like us to know as we care for you? I live at home alone. I have 2 dogs at home w/ me.   - Provide timely, complete, and accurate information to patient/family  - Incorporate patient and family knowledge, values, beliefs, and cultural backgrounds into the planning and delivery of care  - Encourage patient/family to participate in care and decision-making at the level they choose  - Honor patient and family perspectives and choices  Outcome: Progressing     Problem: Patient/Family Goals  Goal: Patient/Family Long Term Goal  Description: Patient's Long Term Goal: To feel better & return back home, w/ possible home health care services. Interventions:  -Pain Management Consult  -Administer IV Solu-Medrol as ordered  -Pain Management  -Diagnostic exams  -Routine blood work, report abnormal values to MD(s)  -Assist in Discharge planning  -Update pt/family on plan of care & give clinical updates. - See additional Care Plan goals for specific interventions  Outcome: Progressing  Goal: Patient/Family Short Term Goal  Description: Patient's Short Term Goal: To treat back pain, get MRI of back completed. Interventions:   -Pain Management Consult  -Administer IV Solu-Medrol as ordered  -Pain Management  -Diagnostic exams  -Routine blood work, report abnormal values to MD(s)  -Assist in Discharge planning  -Update pt/family on plan of care & give clinical updates.    - See additional Care Plan goals for specific interventions  Outcome: Progressing     Problem: PAIN - ADULT  Goal: Verbalizes/displays adequate comfort level or patient's stated pain goal  Description: INTERVENTIONS:  - Encourage pt to monitor pain and request assistance  - Assess pain using appropriate pain scale  - Administer analgesics based on type and severity of pain and evaluate response  - Implement non-pharmacological measures as appropriate and evaluate response  - Consider cultural and social influences on pain and pain management  - Manage/alleviate anxiety  - Utilize distraction and/or relaxation techniques  - Monitor for opioid side effects  - Notify MD/LIP if interventions unsuccessful or patient reports new pain  - Anticipate increased pain with activity and pre-medicate as appropriate  Outcome: Progressing     Problem: SAFETY ADULT - FALL  Goal: Free from fall injury  Description: INTERVENTIONS:  - Assess pt frequently for physical needs  - Identify cognitive and physical deficits and behaviors that affect risk of falls.   - [place] fall precautions as indicated by assessment.  - Educate pt/family on patient safety including physical limitations  - Instruct pt to call for assistance with activity based on assessment  - Modify environment to reduce risk of injury  - Provide assistive devices as appropriate  - Consider OT/PT consult to assist with strengthening/mobility  - Encourage toileting schedule  Outcome: Progressing     Problem: MUSCULOSKELETAL - ADULT  Goal: Return mobility to safest level of function  Description: INTERVENTIONS:  - Assess patient stability and activity tolerance for standing, transferring and ambulating w/ or w/o assistive devices  - Assist with transfers and ambulation using safe patient handling equipment as needed  - Ensure adequate protection for wounds/incisions during mobilization  - Obtain PT/OT consults as needed  - Advance activity as appropriate  - Communicate ordered activity level and limitations with patient/family  Outcome: Progressing  Goal: Maintain proper alignment of affected body part  Description: INTERVENTIONS:  - Support and protect limb and body alignment per provider's orders  - Instruct and reinforce with patient and family use of appropriate assistive device and precautions (e.g. spinal or hip dislocation precautions)  Outcome: Progressing     Problem: Impaired Functional Mobility  Goal: Achieve highest/safest level of mobility/gait  Description: Interventions:  - Assess patient's functional ability and stability  - Promote increasing activity/tolerance for mobility and gait  - Educate and engage patient/family in tolerated activity level and precautions    Outcome: Progressing     Problem: Impaired Activities of Daily Living  Goal: Achieve highest/safest level of independence in self care  Description: Interventions:  - Assess ability and encourage patient to participate in ADLs to maximize function  - Promote sitting position while performing ADLs such as feeding, grooming, and bathing  - Educate and encourage patient/family in tolerated functional activity level and precautions during self-care  Outcome: Progressing   Pt seen by PT/OT today-walked in hallway with PT and worked on stairs as well-rec for home with [facility]-SW notified and waiting for accepting [address] at this time, pt to be potential discharge tomorrow, continues on scheduled tylenol po, tramadol given prn for pain as well, bed kept low and locked, call light left within reach, pt able to make staff aware of any needs.

## 2023-11-14 ENCOUNTER — PATIENT OUTREACH (OUTPATIENT)
Dept: CASE MANAGEMENT | Age: 78
End: 2023-11-14

## 2023-11-14 ENCOUNTER — TELEPHONE (OUTPATIENT)
Dept: PHYSICAL MEDICINE AND REHAB | Facility: CLINIC | Age: 78
End: 2023-11-14

## 2023-11-14 ENCOUNTER — TELEPHONE (OUTPATIENT)
Dept: INTERNAL MEDICINE CLINIC | Facility: CLINIC | Age: 78
End: 2023-11-14

## 2023-11-14 ENCOUNTER — HOSPITAL ENCOUNTER (OUTPATIENT)
Facility: HOSPITAL | Age: 78
Setting detail: OBSERVATION
Discharge: NURSING, REHAB OR LONG-TERM CARE | End: 2023-11-17
Attending: EMERGENCY MEDICINE | Admitting: HOSPITALIST
Payer: MEDICARE

## 2023-11-14 DIAGNOSIS — M54.59 INTRACTABLE LOW BACK PAIN: Primary | ICD-10-CM

## 2023-11-14 DIAGNOSIS — M84.48XA SACRAL INSUFFICIENCY FRACTURE, INITIAL ENCOUNTER: ICD-10-CM

## 2023-11-14 DIAGNOSIS — Z78.9 IMPAIRED MOBILITY AND ADLS: ICD-10-CM

## 2023-11-14 DIAGNOSIS — M54.9 BACK PAIN WITH RADIATION: ICD-10-CM

## 2023-11-14 DIAGNOSIS — R42 FEELS AS THOUGH WILL FALL: ICD-10-CM

## 2023-11-14 DIAGNOSIS — Z74.09 IMPAIRED MOBILITY AND ADLS: ICD-10-CM

## 2023-11-14 DIAGNOSIS — Z02.9 ENCOUNTERS FOR UNSPECIFIED ADMINISTRATIVE PURPOSE: Primary | ICD-10-CM

## 2023-11-14 LAB
ANION GAP SERPL CALC-SCNC: 6 MMOL/L (ref 0–18)
BUN BLD-MCNC: 18 MG/DL (ref 9–23)
BUN/CREAT SERPL: 29 (ref 10–20)
CALCIUM BLD-MCNC: 9.6 MG/DL (ref 8.7–10.4)
CHLORIDE SERPL-SCNC: 102 MMOL/L (ref 98–112)
CO2 SERPL-SCNC: 27 MMOL/L (ref 21–32)
CREAT BLD-MCNC: 0.62 MG/DL
EGFRCR SERPLBLD CKD-EPI 2021: 92 ML/MIN/1.73M2 (ref 60–?)
GLUCOSE BLD-MCNC: 108 MG/DL (ref 70–99)
OSMOLALITY SERPL CALC.SUM OF ELEC: 282 MOSM/KG (ref 275–295)
POTASSIUM SERPL-SCNC: 3.9 MMOL/L (ref 3.5–5.1)
SODIUM SERPL-SCNC: 135 MMOL/L (ref 136–145)

## 2023-11-14 PROCEDURE — 1159F MED LIST DOCD IN RCRD: CPT

## 2023-11-14 PROCEDURE — 1111F DSCHRG MED/CURRENT MED MERGE: CPT

## 2023-11-14 PROCEDURE — 99222 1ST HOSP IP/OBS MODERATE 55: CPT | Performed by: HOSPITALIST

## 2023-11-14 RX ORDER — HYDROMORPHONE HYDROCHLORIDE 1 MG/ML
0.4 INJECTION, SOLUTION INTRAMUSCULAR; INTRAVENOUS; SUBCUTANEOUS EVERY 2 HOUR PRN
Status: DISCONTINUED | OUTPATIENT
Start: 2023-11-14 | End: 2023-11-17

## 2023-11-14 RX ORDER — DEXAMETHASONE SODIUM PHOSPHATE 10 MG/ML
10 INJECTION, SOLUTION INTRAMUSCULAR; INTRAVENOUS ONCE
Status: COMPLETED | OUTPATIENT
Start: 2023-11-14 | End: 2023-11-14

## 2023-11-14 RX ORDER — MORPHINE SULFATE 2 MG/ML
2 INJECTION, SOLUTION INTRAMUSCULAR; INTRAVENOUS ONCE
Status: COMPLETED | OUTPATIENT
Start: 2023-11-14 | End: 2023-11-14

## 2023-11-14 RX ORDER — DIAZEPAM 5 MG/ML
2.5 INJECTION, SOLUTION INTRAMUSCULAR; INTRAVENOUS ONCE
Status: COMPLETED | OUTPATIENT
Start: 2023-11-14 | End: 2023-11-14

## 2023-11-14 RX ORDER — METOCLOPRAMIDE HYDROCHLORIDE 5 MG/ML
10 INJECTION INTRAMUSCULAR; INTRAVENOUS EVERY 8 HOURS PRN
Status: DISCONTINUED | OUTPATIENT
Start: 2023-11-14 | End: 2023-11-17

## 2023-11-14 RX ORDER — TEMAZEPAM 15 MG/1
15 CAPSULE ORAL NIGHTLY PRN
Status: DISCONTINUED | OUTPATIENT
Start: 2023-11-14 | End: 2023-11-17

## 2023-11-14 RX ORDER — HYDROMORPHONE HYDROCHLORIDE 1 MG/ML
0.2 INJECTION, SOLUTION INTRAMUSCULAR; INTRAVENOUS; SUBCUTANEOUS EVERY 2 HOUR PRN
Status: DISCONTINUED | OUTPATIENT
Start: 2023-11-14 | End: 2023-11-17

## 2023-11-14 RX ORDER — GABAPENTIN 100 MG/1
200 CAPSULE ORAL 3 TIMES DAILY
Status: DISCONTINUED | OUTPATIENT
Start: 2023-11-14 | End: 2023-11-15

## 2023-11-14 RX ORDER — ACETAMINOPHEN 500 MG
500 TABLET ORAL EVERY 4 HOURS PRN
Status: DISCONTINUED | OUTPATIENT
Start: 2023-11-14 | End: 2023-11-17

## 2023-11-14 RX ORDER — METHYLPREDNISOLONE SODIUM SUCCINATE 40 MG/ML
40 INJECTION, POWDER, LYOPHILIZED, FOR SOLUTION INTRAMUSCULAR; INTRAVENOUS EVERY 12 HOURS
Status: DISCONTINUED | OUTPATIENT
Start: 2023-11-14 | End: 2023-11-17

## 2023-11-14 RX ORDER — KETOROLAC TROMETHAMINE 15 MG/ML
15 INJECTION, SOLUTION INTRAMUSCULAR; INTRAVENOUS ONCE
Status: COMPLETED | OUTPATIENT
Start: 2023-11-14 | End: 2023-11-14

## 2023-11-14 RX ORDER — CYCLOBENZAPRINE HCL 5 MG
10 TABLET ORAL 3 TIMES DAILY PRN
Status: DISCONTINUED | OUTPATIENT
Start: 2023-11-14 | End: 2023-11-15

## 2023-11-14 RX ORDER — ONDANSETRON 2 MG/ML
4 INJECTION INTRAMUSCULAR; INTRAVENOUS EVERY 6 HOURS PRN
Status: DISCONTINUED | OUTPATIENT
Start: 2023-11-14 | End: 2023-11-17

## 2023-11-14 RX ORDER — HYDROMORPHONE HYDROCHLORIDE 1 MG/ML
0.8 INJECTION, SOLUTION INTRAMUSCULAR; INTRAVENOUS; SUBCUTANEOUS EVERY 2 HOUR PRN
Status: DISCONTINUED | OUTPATIENT
Start: 2023-11-14 | End: 2023-11-17

## 2023-11-14 NOTE — ED QUICK NOTES
PCT attempted to road test with walker, pt unable to ambulate, stating \"the pain is too bad\".  MD aware>consulting [facility]

## 2023-11-14 NOTE — PROGRESS NOTES
Initial Post Discharge Follow Up   Discharge Date: 11/12/23  Contact Date: 11/14/2023    Consent Verification:  Assessment Completed With: Patient  HIPAA Verified? Yes    Discharge Dx:   Acute on chronic Back Pain     General:   How have you been since your discharge from the hospital? Spoke with patient states she is not doing so well. Pt continues to have 10/10 pain which radiates to her left lower extremity. Pt denies any weakness, but it is difficult to get around, she gets around slowly. Pt denies any fevers, chills, nausea, vomiting, shortness of breath, chest pain or any other symptoms. Pt states will place call out to Dr. Jose Luis Hernandez for further recommendations. Do you have any pain since discharge? Yes  Where: back and left leg pain    Rating on pain scale 1-10, 10 being the worst pain you have ever experienced, what is current pain: 10  Alleviating factors: Hydrocodone, ketorolac Tromethamine, lidocaine patch  Aggravating factors: walking  Is the pain manageable at home? No    How well was your pain managed while in the hospital?   On a scale of 1-5   1- Very Poor and 5- Very well   5  When you were leaving the hospital were your discharge instructions reviewed with you? Yes  How well were your discharge instructions explained to you? On a scale of 1-5   1- Very Poor and 5- Very well   5  Do you have any questions about your discharge instructions? No  Before leaving the hospital was your diagnoses explained to you? Yes  Do you have any questions about your diagnoses? No  Are you able to perform normal daily activities of living as you have prior to your hospital stay (dressing, bathing, ambulating to the bathroom, etc)? yes  (NCM) Was patient given a different diet per AVS? no      Medications: Reviewed medication list.  Medications are up to date. Current Outpatient Medications   Medication Sig Dispense Refill    lidocaine-menthol 4-1 % External Patch Place 1 patch onto the skin daily.  27 patch 0    HYDROcodone-acetaminophen 5-325 MG Oral Tab Take 1 tablet by mouth every 6 (six) hours as needed. 10 tablet 0    Ketorolac Tromethamine 10 MG Oral Tab Take 1 tablet (10 mg total) by mouth every 6 (six) hours as needed. 20 tablet 0    gabapentin 300 MG Oral Cap Take 1 capsule (300 mg total) by mouth 3 (three) times daily. 90 capsule 2    amLODIPine 2.5 MG Oral Tab Take 1 tablet (2.5 mg total) by mouth daily. 30 tablet 11    triamcinolone 0.1 % External Cream Apply 1 Application topically 2 (two) times daily as needed. 60 g 0    aspirin 81 MG Oral Tab Take 1 tablet (81 mg total) by mouth daily. Were there any changes to your current medication(s) noted on the AVS? Yes  If so, were these medication changes discussed with you prior to leaving the hospital? Yes  If a new medication was prescribed:    Was the new medication's purpose & side effects reviewed? Yes  Do you have any questions about your new medication? No  Did you  your discharge medications when you left the hospital? Yes  Let's go over your medications together to make sure we are not missing anything. Medications Reviewed  Are there any reasons that keep you from taking your medication as prescribed? No  Are you having any concerns with constipation? No  Did patient receive their flu shot (Sept-March)? No    Discharge medications reviewed/discussed/and reconciled against outpatient medications with patient. Any changes or updates to medications sent to PCP. Patient Acknowledged     Referrals/orders at D/C:  Referrals/orders placed at D/C? yes  What services:   Home health   (If HH was ordered) Has HH been set up? Yes    If Yes: With Whom: [facility]  Except for [address] mentioned above, have you scheduled these other services? No    DME ordered at D/C? No      Discharge orders, AVS reviewed and discussed with patient. Any changes or updates to orders sent to PCP.   Patient Acknowledged      SDOH:   Transportation Needs: No Transportation Needs (11/8/2023)    Transportation Needs     Lack of Transportation: No     Financial Resource Strain: Low Risk  (11/14/2023)    Financial Resource Strain     Difficulty of Paying Living Expenses: Not very hard     Med Affordability: No           Follow up appointments:          TCC  Was TCC ordered: No      PCP (If no TCC appointment)  Does patient already have a PCP appointment scheduled? No  NCM Attempted to schedule PCP office TCM appointment with patient   If no appointment scheduled: Explain: no availability with PCP office. Specialist    Does the patient have any other follow up appointment(s) needing to be scheduled? Yes  If yes: NCM reviewed upcoming specialist appointment with patient: Yes  Does the patient need assistance scheduling appointment(s): No    Is there any reason as to why you cannot make your appointment(s)? No     Needs post D/C:   Now that you are home, are there any needs or concerns you need addressed before your next visit with your PCP?  (DME, meds, questions, etc.): No    Interventions by NCM:   All discharge instructions reviewed with the patient. Reviewed when to call MD vs when to call 911 or go the ED. Educated patient on the importance of taking all meds as prescribed as well as close f/u with PCP/specialists. Pt verbalized understanding and will contact the office with any further questions or concerns. Patient denies fevers, chills, nausea, vomiting, shortness of breath, chest pain, or any other symptoms at this time. NCM attempted to schedule HFU, no availability with PCP, within TCM timeframe. NCM sent TE to PCP office re: assistance in scheduling HFU appt. NCM provided contact information for any further questions/concerns. Patient verbalized understanding and agreeable. Overall Rating:    How would you rate the care you received while in the hospital? good    CCM referral placed:    No    BOOK BY DATE: 11/26/23

## 2023-11-14 NOTE — ED QUICK NOTES
Rounding Completed    Plan of Care reviewed. Waiting for lab/imaging results. Elimination needs assessed. Patient resting in bed, speaking in full sentences. Pt on VS monitor for frequent VS assessments. Pt requesting to be placed on puriwick. Bed is locked and in lowest position. Call light within reach.

## 2023-11-14 NOTE — ED INITIAL ASSESSMENT (HPI)
Via [facility] from home. Lower back pain radiating down left leg , worse since last night. Had injection last week. Lives alone, difficulty caring for self.    Difficulty ambulating

## 2023-11-14 NOTE — PAYOR COMM NOTE
--------------  DISCHARGE REVIEW    Payor: Velma Lock #:  [id number]  Authorization Number: [id number]    Admit date: 23  Admit time:   8:42 PM  Discharge Date: 2023  1:47 PM     Admitting Physician: Matthew Yoon MD  Attending Physician:  No att. providers found  Primary Care Physician: Kenny Bangura MD          Discharge Summary Notes        Discharge Summary signed by Mary Shannon MD at 2023 12:02 PM       Author: Mary Shannon MD Specialty: HOSPITALIST Author Type: Physician    Filed: 2023 12:02 PM Date of Service: 2023 11:56 AM Status: Signed    : Mary Shannon MD (Physician)         [facility]    Discharge Summary    Murray Koch Patient Status:  Inpatient    1945 MRN [id number]   Location [facility] 5SW/SE Attending Mary Shannon MD   Hosp Day # 4 PCP Yuan Santos MD     Date of Admission: 2023   Date of Discharge: 2023    Hospital Discharge Diagnoses: Acute on chronic Back Pain    Lace+ Score: 54  59-90 High Risk  29-58 Medium Risk  0-28   Low Risk. TCM Follow-Up Recommendation:  LACE > 58: High Risk of readmission after discharge from the hospital.        Admitting Diagnosis: Back pain with radiation [M54.9]    Disposition: Home    Discharge Diagnosis: . Principal Problem:    Back pain with radiation      Hospital Course:   Reason for Admission:     Discharge Physical Exam:   Physical Exam:    General: No acute distress. Respiratory: Clear to auscultation bilaterally. No wheezes. No rhonchi. Cardiovascular: S1, S2. Regular rate and rhythm. No murmurs, rubs or gallops. Abdomen: Soft, nontender, nondistended. Positive bowel sounds. No rebound or guarding. Neurologic: No focal neurological deficits. Musculoskeletal: Moves all extremities.     Hospital Course:     Back pain with radiation     Spinal Stenosis     Chronic Lumbar Pain   - pain control with IV  pain meds and lidocaine patch   - MRI of the lumbar spine reviewed  - CT of abdomen and pelvis reveals multiple sacral fractures   - continue norco 5/325mg q 4 hours prn for pain- script printed by pain medicine specialist   - tylenol 650 mg q 6 hours scheduled  - ketoralac ordered by Pain as well   - lidocaine patch  - status post steroid injection 11/10 at L5-S1 with local anesthesia   - Pain service consult appreciated  - check vitamin D level- WNL  - IV solumedrol - stop at disharge   - PT/OT after steroid injection- home with home PT     Leukocytosis  - mostly reactive and secondary to steroids   - resolved     HTN  Continue home med            Quality:  DVT Prophylaxis: SCDs  CODE status: Full    Complications: none      Surgical Procedures       Case IDs Date Procedure Surgeon Location Status    [id number] 11/10/23 LUMBAR EPIDURAL STEROID INJECTION Dexter Esqueda MD [facility] MAIN OR [facility]              Discharge Plan:   Discharge Condition: Stable    Current Discharge Medication List        New Orders    Details   lidocaine-menthol 4-1 % External Patch Place 1 patch onto the skin daily. HYDROcodone-acetaminophen 5-325 MG Oral Tab Take 1 tablet by mouth every 6 (six) hours as needed. Ketorolac Tromethamine 10 MG Oral Tab Take 1 tablet (10 mg total) by mouth every 6 (six) hours as needed. Home Meds - Unchanged    Details   gabapentin 300 MG Oral Cap Take 1 capsule (300 mg total) by mouth 3 (three) times daily. amLODIPine 2.5 MG Oral Tab Take 1 tablet (2.5 mg total) by mouth daily. triamcinolone 0.1 % External Cream Apply 1 Application topically 2 (two) times daily as needed. aspirin 81 MG Oral Tab Take 1 tablet (81 mg total) by mouth daily.                  Discharge Diet: General diet healthy    Discharge Activity: As tolerated       Discharge Medications        START taking these medications        Instructions Prescription details   HYDROcodone-acetaminophen 5-325 MG Tabs  Commonly known as: Norco Take 1 tablet by mouth every 6 (six) hours as needed. Stop taking on: November 15, 2023  Quantity: 10 tablet  Refills: 0     Ketorolac Tromethamine 10 MG Tabs  Commonly known as: TORADOL      Take 1 tablet (10 mg total) by mouth every 6 (six) hours as needed. Stop taking on: November 15, 2023  Quantity: 20 tablet  Refills: 0     lidocaine-menthol 4-1 % Ptch  Start taking on: November 13, 2023      Place 1 patch onto the skin daily. Quantity: 30 patch  Refills: 0            CONTINUE taking these medications        Instructions Prescription details   amLODIPine 2.5 MG Tabs  Commonly known as: Norvasc      Take 1 tablet (2.5 mg total) by mouth daily. Quantity: 30 tablet  Refills: 11     aspirin 81 MG Tabs      Take 1 tablet (81 mg total) by mouth daily. Refills: 0     gabapentin 300 MG Caps  Commonly known as: Neurontin      Take 1 capsule (300 mg total) by mouth 3 (three) times daily. Quantity: 90 capsule  Refills: 2     triamcinolone 0.1 % Crea  Commonly known as: Kenalog      Apply 1 Application topically 2 (two) times daily as needed. Quantity: 60 g  Refills: 0            STOP taking these medications      lidocaine 5 % Ptch  Commonly known as: Lidoderm        methylPREDNISolone 4 MG Tbpk  Commonly known as: Medrol                  Where to Get Your Medications        These medications were sent to [facility] [address], [address] [place] [address] [phone], [address] [facility]      Phone: [phone]   HYDROcodone-acetaminophen 5-325 MG Tabs  Ketorolac Tromethamine 10 MG Tabs  lidocaine-menthol 4-1 % Ptch         Follow up: Follow-up Information       Sully Rosas, DO Follow up. Specialties: Physical Medicine, PHYSIATRY  Contact information:  [address] [place] [address] Road  [phone]                             Follow up Labs and imaging:          Other Discharge Instructions:         [facility] [address] [facility] [address], [address] [facility]  Phone: [phone]  Fax: [phone]         Time spent:  > 30 minutes    Marcela Mcgill MD  11/12/2023      Electronically signed by Delia Almanza MD on 11/12/2023 12:02 PM         REVIEWER COMMENTS

## 2023-11-14 NOTE — CM/SW NOTE
EDCM called to assist with MARIA E placement. Pt has a recent admission but does have a managed medicare plan ([employer]). Pt will need prior authorization for MARIA E. Order placed for PT/OT eval.  Aidin referral initiated. Prior auth to be initiated after PT/OT eval as that report is needed for prior auth. Informed Dr. Lyubov Nguyễn that pt can board in ER while pending prior authorization. Pt doesn't have a facility preference but would like to remain close to home. Pt states she doesn't have anyone except for her neighbors and would like to stay close to them in case she needs anything.

## 2023-11-14 NOTE — TELEPHONE ENCOUNTER
Pt called stating that she had an injection on her back from another Dr. Nikos Funes stated her pain is severe and she is ready to go to the emergency room. Pt has seen Dr Tessa Wells in the past over 2 years ago for carpal tunnel, however per patient this is something new for spinal stenosis. Informed patient that she needs to be evaluated in office as a new patient and offered her an appointment. Suggested that patient also calls the office of the Doctor who performed her injection and update them that she is in severe pain for any further recommendations. In addition educated patient that she can seek emergency care if the pain is severe for acute intervention. Pt did not want to schedule an appointment at this time. She will call once she feels better to see if she can schedule. Per patient she just wanted to notify the office of Dr Tessa Wells that she has a new pain and will go to ED.

## 2023-11-15 LAB
ANION GAP SERPL CALC-SCNC: 5 MMOL/L (ref 0–18)
BASOPHILS # BLD AUTO: 0.01 X10(3) UL (ref 0–0.2)
BASOPHILS NFR BLD AUTO: 0.1 %
BUN BLD-MCNC: 18 MG/DL (ref 9–23)
BUN/CREAT SERPL: 30.5 (ref 10–20)
CALCIUM BLD-MCNC: 9.8 MG/DL (ref 8.7–10.4)
CHLORIDE SERPL-SCNC: 107 MMOL/L (ref 98–112)
CO2 SERPL-SCNC: 28 MMOL/L (ref 21–32)
CREAT BLD-MCNC: 0.59 MG/DL
DEPRECATED RDW RBC AUTO: 41.9 FL (ref 35.1–46.3)
EGFRCR SERPLBLD CKD-EPI 2021: 93 ML/MIN/1.73M2 (ref 60–?)
EOSINOPHIL # BLD AUTO: 0 X10(3) UL (ref 0–0.7)
EOSINOPHIL NFR BLD AUTO: 0 %
ERYTHROCYTE [DISTWIDTH] IN BLOOD BY AUTOMATED COUNT: 11.5 % (ref 11–15)
GLUCOSE BLD-MCNC: 118 MG/DL (ref 70–99)
GLUCOSE BLDC GLUCOMTR-MCNC: 166 MG/DL (ref 70–99)
HCT VFR BLD AUTO: 38.7 %
HGB BLD-MCNC: 13.2 G/DL
IMM GRANULOCYTES # BLD AUTO: 0.07 X10(3) UL (ref 0–1)
IMM GRANULOCYTES NFR BLD: 0.7 %
LYMPHOCYTES # BLD AUTO: 1.09 X10(3) UL (ref 1–4)
LYMPHOCYTES NFR BLD AUTO: 10.3 %
MCH RBC QN AUTO: 33.9 PG (ref 26–34)
MCHC RBC AUTO-ENTMCNC: 34.1 G/DL (ref 31–37)
MCV RBC AUTO: 99.5 FL
MONOCYTES # BLD AUTO: 0.35 X10(3) UL (ref 0.1–1)
MONOCYTES NFR BLD AUTO: 3.3 %
NEUTROPHILS # BLD AUTO: 9.11 X10 (3) UL (ref 1.5–7.7)
NEUTROPHILS # BLD AUTO: 9.11 X10(3) UL (ref 1.5–7.7)
NEUTROPHILS NFR BLD AUTO: 85.6 %
OSMOLALITY SERPL CALC.SUM OF ELEC: 293 MOSM/KG (ref 275–295)
PLATELET # BLD AUTO: 379 10(3)UL (ref 150–450)
POTASSIUM SERPL-SCNC: 4.7 MMOL/L (ref 3.5–5.1)
RBC # BLD AUTO: 3.89 X10(6)UL
SODIUM SERPL-SCNC: 140 MMOL/L (ref 136–145)
WBC # BLD AUTO: 10.6 X10(3) UL (ref 4–11)

## 2023-11-15 PROCEDURE — 99233 SBSQ HOSP IP/OBS HIGH 50: CPT | Performed by: INTERNAL MEDICINE

## 2023-11-15 RX ORDER — CYCLOBENZAPRINE HCL 5 MG
10 TABLET ORAL 3 TIMES DAILY
Status: DISCONTINUED | OUTPATIENT
Start: 2023-11-15 | End: 2023-11-17

## 2023-11-15 RX ORDER — GABAPENTIN 300 MG/1
300 CAPSULE ORAL 3 TIMES DAILY
Status: DISCONTINUED | OUTPATIENT
Start: 2023-11-15 | End: 2023-11-17

## 2023-11-15 RX ORDER — HYDROCODONE BITARTRATE AND ACETAMINOPHEN 5; 325 MG/1; MG/1
1 TABLET ORAL EVERY 4 HOURS PRN
Status: DISCONTINUED | OUTPATIENT
Start: 2023-11-15 | End: 2023-11-17

## 2023-11-15 NOTE — PHYSICAL THERAPY NOTE
PHYSICAL THERAPY EVALUATION - INPATIENT     Room Number: 546/546-A  Evaluation Date: 11/15/2023  Type of Evaluation: Initial   Physician Order: PT Eval and Treat    Presenting Problem: Back pain with radiation, bilateral sacral insufficiency fx  Co-Morbidities : S/P L5-S1 LESI 11/10/23, DJD, HTN, HLD, OA, osteopenia  Reason for Therapy: Mobility Dysfunction and Discharge Planning    PHYSICAL THERAPY ASSESSMENT     Patient is a 69year old female admitted 11/14/2023 for back pain with radiation. Patient received semi-fowlers in bed, agreeable to physical therapy evaluation. Vital signs monitored as noted below, no adverse symptoms and patient stable during session. Education with patient provided on Spine precautions, Physical therapy plan of care, and physiological benefits of out of bed mobility. Pt reporting D/C Sunday and was able to get around first floor of home with the walker, was sleeping on the couch. Pt reports Monday evening having increased pain levels limiting ability to stand or walk, needing to crawl. Pt reports she was not able to attempt the stairs to go up to bedroom. Pt reports new numbness in L foot, pain with movement and unable to tolerate coming to seated position at this time, relief for pain only at rest and with LLE knee and hip flexion. MOBILITY:  ROLLING: standby assist - log roll, bedrail used  SUPINE TO SIT: not tested - attempted but unable to tolerate  GAIT: Not tested 2/2 poor seated and standing tolerance/pain    Patient is currently functioning below baseline with bed mobility, transfers, gait, and stair negotiation as a result of the following impairments: pain, medical status, and anxiety. Next session anticipate to progress bed mobility, transfers, and gait.     Patient history and/or personal factors that may impact the plan of care include home accessibility concerns, limited social support, co-morbidities (S/P L5-S1 LESI 11/10/23, DJD, HTN, HLD, OA, osteopenia) affecting pain levels, medical status, longstanding history of pain, and has history of recent hospitalization. Based on the physical therapy examination of the noted systems and functional activity/participation limitations, the patient presentation is evolving given the patient demonstrates worsening of previously stable condition and demonstrates worsening of co-morbidities. Patient would benefit from continued skilled physical therapy interventions to maximize patient safety and functional independence. Updated nurse on results of session, patient left supine in bed with alarm engaged, all lines intact, all needs met with call light in reach. The patient's Approx Degree of Impairment: 81.38% has been calculated based on documentation in the [facility] '6 clicks' Inpatient Basic Mobility Short Form. Research supports that patients with this level of impairment may benefit from long-term care, however based on patient's PLOF and pending pain management recommend subacute rehabilitation. Patient will benefit from continued IP PT services to address these deficits in preparation for discharge. DISCHARGE RECOMMENDATIONS  PT Discharge Recommendations: Sub-acute rehabilitation    PLAN  PT Treatment Plan: Bed mobility; Body mechanics; Endurance; Energy conservation;Patient education; Family education;Gait training;Strengthening;Stair training;Transfer training;Balance training;Stoop training  Rehab Potential : Good  Frequency (Obs): 3-5x/week       PHYSICAL THERAPY MEDICAL/SOCIAL HISTORY     History related to current admission: Recent D/C home 11/12 with home-health PT, supervision with rolling walker and able to perform 8 stairs with bilateral handrails at discharge     Problem List  Principal Problem:    Intractable low back pain  Active Problems:    Impaired mobility and ADLs    Feels as though will fall      HOME SITUATION  Home Situation  [facility] of Home: [facility]  Home Layout: Two level;Bed/bath upstairs; Able to live on main level  Stairs to Enter : 1  Railing: No  Stairs to Bedroom: 16  Railing: Yes  Lives With: Alone (son lives out of state)  Drives: Yes  Patient Owned Equipment: Rolling walker     Prior Level of [place]: independent no assistive device, has been using rolling walker since previous admission    SUBJECTIVE  \"I'm sorry, I can't. \"    PHYSICAL THERAPY EXAMINATION     OBJECTIVE  Precautions: Spine;Bed/chair alarm  Fall Risk: High fall risk    WEIGHT BEARING RESTRICTION  None    PAIN ASSESSMENT  Ratin  Location: low back radiating down posterior LLE  Management Techniques: Body mechanics; Activity promotion    COGNITION  Overall Cognitive Status:  WFL - within functional limits    RANGE OF MOTION AND STRENGTH ASSESSMENT  Upper extremity ROM and strength are within functional limits  BUEs within spinal precautions   Lower extremity ROM is within functional limits  BLEs within spinal precautions   Lower extremity strength is within functional limits  RLE, LLE not assessed 2/2 pain    BALANCE  Static Sitting: Not tested  Dynamic Sitting: Not tested  Static Standing: Not tested  Dynamic Standing: Not tested    ADDITIONAL TESTS                       Other Test(s): negative SLR test bilaterally, relief with LLE flexion            NEUROLOGICAL FINDINGS           Sensation: numbness in L foot, pain radiating down posterior leg          ACTIVITY TOLERANCE  Pulse: 75  Heart Rate Source: Monitor     BP: 139/72  BP Location: Right arm  BP Method: Automatic  Patient Position: Lying    O2 WALK  Oxygen Therapy  SPO2% on Room Air at Rest: 99    AM-PAC '6-Clicks' INPATIENT SHORT FORM - BASIC MOBILITY  How much difficulty does the patient currently have. ..   Patient Difficulty: Turning over in bed (including adjusting bedclothes, sheets and blankets)?: A Little   Patient Difficulty: Sitting down on and standing up from a chair with arms (e.g., wheelchair, bedside commode, etc.): Unable   Patient Difficulty: Moving from lying on back to sitting on the side of the bed?: A Lot   How much help from another person does the patient currently need. .. Help from Another: Moving to and from a bed to a chair (including a wheelchair)?: Total   Help from Another: Need to walk in hospital room?: Total   Help from Another: Climbing 3-5 steps with a railing?: Total     AM-PAC Score:  Raw Score: 9   Approx Degree of Impairment: 81.38%   Standardized Score (AM-PAC Scale): 30.55   CMS Modifier (G-Code): CM    FUNCTIONAL ABILITY STATUS  Functional Mobility/Gait Assessment  Gait Assistance: Not tested    Exercise/Education Provided:  Bed mobility  Body mechanics  Energy conservation  Functional activity tolerated  Posture  ROM    Patient End of Session: In bed;Needs met;Call light within reach;RN aware of session/findings; All patient questions and concerns addressed; Alarm set    CURRENT GOALS    Goals to be met by: 11/25/23  Patient Goal Patient's self-stated goal is: decrease pain   Goal #1 Patient is able to demonstrate supine - sit EOB @ level: modified independent     Goal #1   Current Status    Goal #2 Patient is able to demonstrate transfers EOB to/from [facility] at assistance level: supervision with walker - rolling     Goal #2  Current Status    Goal #3 Patient is able to ambulate 50 feet with assist device: walker - rolling at assistance level: supervision   Goal #3   Current Status    Goal #4 Patient will negotiate 16 stairs/one curb w/ assistive device and contact guard assistance   Goal #4   Current Status    Goal #5 Patient to demonstrate independence with home activity/exercise instructions provided to patient in preparation for discharge.    Goal #5   Current Status    Goal #6    Goal #6  Current Status      Patient Evaluation Complexity Level:  History Moderate - 1 or 2 personal factors and/or co-morbidities   Examination of body systems Moderate - addressing a total of 3 or more elements   Clinical Presentation Moderate - Evolving Clinical Decision Making Moderate Complexity       Therapeutic Activity: 17 minutes      Ankita Blake, PT, DPT  [facility]  Inpatient Rehabilitation  Physical Therapy  [phone]

## 2023-11-15 NOTE — PLAN OF CARE
Patient from home alone, admitted for lower back pain radiating to left leg. Medicated for pain as needed. On IV solu medrol. PT to see patient. Vitals stable. Continue to monitor. Problem: Patient Centered Care  Goal: Patient preferences are identified and integrated in the patient's plan of care  Description: Interventions:  - What would you like us to know as we care for you?   - Provide timely, complete, and accurate information to patient/family  - Incorporate patient and family knowledge, values, beliefs, and cultural backgrounds into the planning and delivery of care  - Encourage patient/family to participate in care and decision-making at the level they choose  - Honor patient and family perspectives and choices  Outcome: Progressing     Problem: METABOLIC/FLUID AND ELECTROLYTES - ADULT  Goal: Electrolytes maintained within normal limits  Description: INTERVENTIONS:  - Monitor labs and rhythm and assess patient for signs and symptoms of electrolyte imbalances  - Administer electrolyte replacement as ordered  - Monitor response to electrolyte replacements, including rhythm and repeat lab results as appropriate  - Fluid restriction as ordered  - Instruct patient on fluid and nutrition restrictions as appropriate  Outcome: Progressing     Problem: SKIN/TISSUE INTEGRITY - ADULT  Goal: Skin integrity remains intact  Description: INTERVENTIONS  - Assess and document risk factors for pressure ulcer development  - Assess and document skin integrity  - Monitor for areas of redness and/or skin breakdown  - Initiate interventions, skin care algorithm/standards of care as needed  Outcome: Progressing     Problem: SAFETY ADULT - FALL  Goal: Free from fall injury  Description: INTERVENTIONS:  - Assess pt frequently for physical needs  - Identify cognitive and physical deficits and behaviors that affect risk of falls.   - [place] fall precautions as indicated by assessment.  - Educate pt/family on patient safety including physical limitations  - Instruct pt to call for assistance with activity based on assessment  - Modify environment to reduce risk of injury  - Provide assistive devices as appropriate  - Consider OT/PT consult to assist with strengthening/mobility  - Encourage toileting schedule  Outcome: Progressing     Problem: Impaired Functional Mobility  Goal: Achieve highest/safest level of mobility/gait  Description: Interventions:  - Assess patient's functional ability and stability  - Promote increasing activity/tolerance for mobility and gait  - Educate and engage patient/family in tolerated activity level and precautions    Outcome: Not Progressing     Problem: Impaired Activities of Daily Living  Goal: Achieve highest/safest level of independence in self care  Description: Interventions:  - Assess ability and encourage patient to participate in ADLs to maximize function  - Promote sitting position while performing ADLs such as feeding, grooming, and bathing  - Educate and encourage patient/family in tolerated functional activity level and precautions during self-care    Outcome: Not Progressing     Problem: PAIN - ADULT  Goal: Verbalizes/displays adequate comfort level or patient's stated pain goal  Description: INTERVENTIONS:  - Encourage pt to monitor pain and request assistance  - Assess pain using appropriate pain scale  - Administer analgesics based on type and severity of pain and evaluate response  - Implement non-pharmacological measures as appropriate and evaluate response  - Consider cultural and social influences on pain and pain management  - Manage/alleviate anxiety  - Utilize distraction and/or relaxation techniques  - Monitor for opioid side effects  - Notify MD/LIP if interventions unsuccessful or patient reports new pain  - Anticipate increased pain with activity and pre-medicate as appropriate  Outcome: Not Progressing

## 2023-11-15 NOTE — CM/SW NOTE
11/15/23 1400   CM/SW Screening   Referral [facility] staff; Chart review;Nursing rounds   Patient's Current Mental Status at Time of Assessment Alert;Oriented   Patient's [address]   Number of Levels in Home 2   Number of Stair in Home 16   Patient lives with Alone   Patient Status Prior to Admission   Independent with ADLs and Mobility No   Pt. requires assistance with Ambulating  (RW)   Services in place prior to admission [address] [facility]   Discharge Needs   Anticipated D/C needs Subacute rehab     CM met w/pt at bedside for [facility] recommendations. CM provided list for choice. Pt chose [facility]  CM reserved in aidin. CM spoke w/Sana @ [facility] to start insurance auth. PASRR level 1 screen completed and uploaded to aidin referral       DC planner/CM to remain available for support and/or discharge planning.     Savanna Menjivar RN, [facility]    Ext.  [phone]

## 2023-11-15 NOTE — ED QUICK NOTES
Rounding Completed    Plan of Care reviewed. Waiting for admission to inpatient. Elimination needs assessed. Patient resting in bed, sleeping, chest rise and fall observed, speaking in full sentences when awoken. Pt on VS monitor for frequent VS assessments. No new requests at this time. Bed is locked and in lowest position. Call light within reach.

## 2023-11-15 NOTE — H&P
[facility]    PATIENT'S NAME: Norah Hoang   ATTENDING PHYSICIAN: Shelli Shetty MD   PATIENT ACCOUNT#:   [de-identified]    LOCATION:  [address]  MEDICAL RECORD #:   [id number]       YOB: 1945  ADMISSION DATE:       11/14/2023    HISTORY AND PHYSICAL EXAMINATION    CHIEF COMPLAINT:  Intractable lumbar radiculopathy. HISTORY OF PRESENT ILLNESS:  The patient is a 67-year-old  female with known degenerative joint disease of lumbar spine, was hospitalized earlier this month with lumbar radiculopathy, and she had lumbar epidural steroid injection at L5 nerve root. She was discharged home but without significant improvement of her pain. Today, came back with recurrent pain radiating down her left lower extremity all the way to the dorsum of her left foot. Patient was given multiple rounds of pain medications in the emergency room without significant improvement in her symptoms. 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 PAST SURGICAL HISTORY:  None. MEDICATIONS:  Please see medication reconciliation list.      ALLERGIES:  No known drug allergies. FAMILY HISTORY:  Mother had cerebrovascular accident and hypertension. Father had coronary artery disease and diabetes mellitus type 2. SOCIAL HISTORY:  Ex-tobacco user. Social alcohol. No drug use. Lives with her family. Independent for basic activities of daily living. REVIEW OF SYSTEMS:  Patient described intractable pain radiating from the left buttock area behind her left thigh and then to the ankle and the dorsum of her left foot with some numbness. No change in her bowel movements. No dysuria or urine frequency. PHYSICAL EXAMINATION:    GENERAL:  Alert and oriented to time, place, and person. Moderate distress.   VITAL SIGNS:  Temperature 98.2, pulse 76, respiratory rate 18, blood pressure 147/69, pulse ox 95% on room air. HEENT:  Atraumatic. Oropharynx clear. Dry mucous membranes. Normal hard and soft palate. Eyes:  Anicteric sclerae. Pupils equal, round, reactive. NECK:  Supple. No lymphadenopathy. Trachea midline. Full range of motion. LUNGS:  Clear to auscultation bilaterally. Normal respiratory effort. HEART:  Regular rate, rhythm. S1 and S2 auscultated. No murmur. ABDOMEN:  Soft, nondistended. No tenderness. Positive bowel sounds. EXTREMITIES:  No edema, clubbing, or cyanosis. NEUROLOGIC:  Motor and sensory intact. ASSESSMENT AND PLAN:  Intractable lumbar radiculopathy, distribution left L5 nerve root. Pain control. IV Solu-Medrol. Gabapentin. Muscle relaxers. Pain service consult. Physical therapy. Further recommendations to follow.      Dictated By Roselyn Epperson MD  d: 11/14/2023 19:07:21  t: 11/14/2023 19:20:50  Job [id number]  SY/

## 2023-11-15 NOTE — ED QUICK NOTES
Orders for admission, patient is aware of plan and ready to go upstairs. Any questions, please call ED RN Hoda at extension [phone]. Patient Covid vaccination status: Fully vaccinated     COVID Test Ordered in ED: None    COVID Suspicion at Admission: N/A    Running Infusions:  None    Mental Status/LOC at time of transport:  Aox4    Other pertinent information:   CIWA score: N/A   NIH score:  N/A Should patient continue taking Vesicare?

## 2023-11-16 LAB
ANION GAP SERPL CALC-SCNC: 9 MMOL/L (ref 0–18)
BASOPHILS # BLD AUTO: 0.02 X10(3) UL (ref 0–0.2)
BASOPHILS NFR BLD AUTO: 0.1 %
BUN BLD-MCNC: 27 MG/DL (ref 9–23)
BUN/CREAT SERPL: 41.5 (ref 10–20)
CALCIUM BLD-MCNC: 9.5 MG/DL (ref 8.7–10.4)
CHLORIDE SERPL-SCNC: 101 MMOL/L (ref 98–112)
CO2 SERPL-SCNC: 23 MMOL/L (ref 21–32)
CREAT BLD-MCNC: 0.65 MG/DL
DEPRECATED RDW RBC AUTO: 42.2 FL (ref 35.1–46.3)
EGFRCR SERPLBLD CKD-EPI 2021: 91 ML/MIN/1.73M2 (ref 60–?)
EOSINOPHIL # BLD AUTO: 0 X10(3) UL (ref 0–0.7)
EOSINOPHIL NFR BLD AUTO: 0 %
ERYTHROCYTE [DISTWIDTH] IN BLOOD BY AUTOMATED COUNT: 11.5 % (ref 11–15)
GLUCOSE BLD-MCNC: 131 MG/DL (ref 70–99)
HCT VFR BLD AUTO: 36.1 %
HGB BLD-MCNC: 12.2 G/DL
IMM GRANULOCYTES # BLD AUTO: 0.17 X10(3) UL (ref 0–1)
IMM GRANULOCYTES NFR BLD: 0.9 %
LYMPHOCYTES # BLD AUTO: 1.12 X10(3) UL (ref 1–4)
LYMPHOCYTES NFR BLD AUTO: 5.9 %
MCH RBC QN AUTO: 33.5 PG (ref 26–34)
MCHC RBC AUTO-ENTMCNC: 33.8 G/DL (ref 31–37)
MCV RBC AUTO: 99.2 FL
MONOCYTES # BLD AUTO: 0.87 X10(3) UL (ref 0.1–1)
MONOCYTES NFR BLD AUTO: 4.6 %
NEUTROPHILS # BLD AUTO: 16.72 X10 (3) UL (ref 1.5–7.7)
NEUTROPHILS # BLD AUTO: 16.72 X10(3) UL (ref 1.5–7.7)
NEUTROPHILS NFR BLD AUTO: 88.5 %
OSMOLALITY SERPL CALC.SUM OF ELEC: 283 MOSM/KG (ref 275–295)
PLATELET # BLD AUTO: 395 10(3)UL (ref 150–450)
POTASSIUM SERPL-SCNC: 4.4 MMOL/L (ref 3.5–5.1)
RBC # BLD AUTO: 3.64 X10(6)UL
SODIUM SERPL-SCNC: 133 MMOL/L (ref 136–145)
WBC # BLD AUTO: 18.9 X10(3) UL (ref 4–11)

## 2023-11-16 PROCEDURE — 99233 SBSQ HOSP IP/OBS HIGH 50: CPT | Performed by: INTERNAL MEDICINE

## 2023-11-16 NOTE — PLAN OF CARE
Frequent rounding maintained. Patient educated on all medications administered. Bed in lowest position, bed alarm and i bed awareness activated, fall precautions in place and call light within reach at all times. All patients questions answered and needs addressed promptly.          Problem: Patient Centered Care  Goal: Patient preferences are identified and integrated in the patient's plan of care  Description: Interventions:  - Provide timely, complete, and accurate information to patient/family  - Incorporate patient and family knowledge, values, beliefs, and cultural backgrounds into the planning and delivery of care  - Encourage patient/family to participate in care and decision-making at the level they choose  - Honor patient and family perspectives and choices  Outcome: Progressing     Problem: METABOLIC/FLUID AND ELECTROLYTES - ADULT  Goal: Electrolytes maintained within normal limits  Description: INTERVENTIONS:  - Monitor labs and rhythm and assess patient for signs and symptoms of electrolyte imbalances  - Administer electrolyte replacement as ordered  - Monitor response to electrolyte replacements, including rhythm and repeat lab results as appropriate  - Fluid restriction as ordered  - Instruct patient on fluid and nutrition restrictions as appropriate  Outcome: Progressing     Problem: SKIN/TISSUE INTEGRITY - ADULT  Goal: Skin integrity remains intact  Description: INTERVENTIONS  - Assess and document risk factors for pressure ulcer development  - Assess and document skin integrity  - Monitor for areas of redness and/or skin breakdown  - Initiate interventions, skin care algorithm/standards of care as needed  Outcome: Progressing     Problem: Impaired Functional Mobility  Goal: Achieve highest/safest level of mobility/gait  Description: Interventions:  - Assess patient's functional ability and stability  - Promote increasing activity/tolerance for mobility and gait  - Educate and engage patient/family in tolerated activity level and precautions  Outcome: Progressing     Problem: Impaired Activities of Daily Living  Goal: Achieve highest/safest level of independence in self care  Description: Interventions:  - Assess ability and encourage patient to participate in ADLs to maximize function  - Promote sitting position while performing ADLs such as feeding, grooming, and bathing  - Educate and encourage patient/family in tolerated functional activity level and precautions during self-care  Outcome: Progressing     Problem: PAIN - ADULT  Goal: Verbalizes/displays adequate comfort level or patient's stated pain goal  Description: INTERVENTIONS:  - Encourage pt to monitor pain and request assistance  - Assess pain using appropriate pain scale  - Administer analgesics based on type and severity of pain and evaluate response  - Implement non-pharmacological measures as appropriate and evaluate response  - Consider cultural and social influences on pain and pain management  - Manage/alleviate anxiety  - Utilize distraction and/or relaxation techniques  - Monitor for opioid side effects  - Notify MD/LIP if interventions unsuccessful or patient reports new pain  - Anticipate increased pain with activity and pre-medicate as appropriate  Outcome: Progressing     Problem: SAFETY ADULT - FALL  Goal: Free from fall injury  Description: INTERVENTIONS:  - Assess pt frequently for physical needs  - Identify cognitive and physical deficits and behaviors that affect risk of falls.   - [place] fall precautions as indicated by assessment.  - Educate pt/family on patient safety including physical limitations  - Instruct pt to call for assistance with activity based on assessment  - Modify environment to reduce risk of injury  - Provide assistive devices as appropriate  - Consider OT/PT consult to assist with strengthening/mobility  - Encourage toileting schedule  Outcome: Progressing

## 2023-11-16 NOTE — PLAN OF CARE
Vital signs monitored per protocol. Pain is primarily experienced only with movement. Plan to pre-medicate prior to PT/OT tomorrow for better mobility. Frequent rounding provided by nursing staff. Safety precautions maintained. Problem: Patient Centered Care  Goal: Patient preferences are identified and integrated in the patient's plan of care  Description: Interventions:  - What would you like us to know as we care for you?  I am from home alone  - Provide timely, complete, and accurate information to patient/family  - Incorporate patient and family knowledge, values, beliefs, and cultural backgrounds into the planning and delivery of care  - Encourage patient/family to participate in care and decision-making at the level they choose  - Honor patient and family perspectives and choices  Outcome: Progressing     Problem: METABOLIC/FLUID AND ELECTROLYTES - ADULT  Goal: Electrolytes maintained within normal limits  Description: INTERVENTIONS:  - Monitor labs and rhythm and assess patient for signs and symptoms of electrolyte imbalances  - Administer electrolyte replacement as ordered  - Monitor response to electrolyte replacements, including rhythm and repeat lab results as appropriate  - Fluid restriction as ordered  - Instruct patient on fluid and nutrition restrictions as appropriate  Outcome: Progressing     Problem: SKIN/TISSUE INTEGRITY - ADULT  Goal: Skin integrity remains intact  Description: INTERVENTIONS  - Assess and document risk factors for pressure ulcer development  - Assess and document skin integrity  - Monitor for areas of redness and/or skin breakdown  - Initiate interventions, skin care algorithm/standards of care as needed  Outcome: Progressing     Problem: Impaired Functional Mobility  Goal: Achieve highest/safest level of mobility/gait  Description: Interventions:  - Assess patient's functional ability and stability  - Promote increasing activity/tolerance for mobility and gait  - Educate and engage patient/family in tolerated activity level and precautions  - Recommend use of total lift for transfers  Outcome: Progressing     Problem: Impaired Activities of Daily Living  Goal: Achieve highest/safest level of independence in self care  Description: Interventions:  - Assess ability and encourage patient to participate in ADLs to maximize function  - Promote sitting position while performing ADLs such as feeding, grooming, and bathing  - Educate and encourage patient/family in tolerated functional activity level and precautions during self-care  - Provide support under elbow of weak side to prevent shoulder subluxation  Outcome: Progressing     Problem: PAIN - ADULT  Goal: Verbalizes/displays adequate comfort level or patient's stated pain goal  Description: INTERVENTIONS:  - Encourage pt to monitor pain and request assistance  - Assess pain using appropriate pain scale  - Administer analgesics based on type and severity of pain and evaluate response  - Implement non-pharmacological measures as appropriate and evaluate response  - Consider cultural and social influences on pain and pain management  - Manage/alleviate anxiety  - Utilize distraction and/or relaxation techniques  - Monitor for opioid side effects  - Notify MD/LIP if interventions unsuccessful or patient reports new pain  - Anticipate increased pain with activity and pre-medicate as appropriate  Outcome: Progressing     Problem: SAFETY ADULT - FALL  Goal: Free from fall injury  Description: INTERVENTIONS:  - Assess pt frequently for physical needs  - Identify cognitive and physical deficits and behaviors that affect risk of falls.   - [place] fall precautions as indicated by assessment.  - Educate pt/family on patient safety including physical limitations  - Instruct pt to call for assistance with activity based on assessment  - Modify environment to reduce risk of injury  - Provide assistive devices as appropriate  - Consider OT/PT consult to assist with strengthening/mobility  - Encourage toileting schedule  Outcome: Progressing

## 2023-11-16 NOTE — CM/SW NOTE
PER Sana admissions at  [address] submitted to [facility] for review, ref # [id number]. Insurance auth pending. DC planner/CM to remain available for support and/or discharge planning.     Renu Sheldon RN, [facility]    Ext.  [phone]

## 2023-11-17 ENCOUNTER — APPOINTMENT (OUTPATIENT)
Dept: GENERAL RADIOLOGY | Facility: HOSPITAL | Age: 78
End: 2023-11-17
Attending: PHYSICIAN ASSISTANT
Payer: MEDICARE

## 2023-11-17 VITALS
HEART RATE: 85 BPM | RESPIRATION RATE: 18 BRPM | OXYGEN SATURATION: 94 % | SYSTOLIC BLOOD PRESSURE: 131 MMHG | HEIGHT: 60 IN | TEMPERATURE: 98 F | BODY MASS INDEX: 19.81 KG/M2 | WEIGHT: 100.88 LBS | DIASTOLIC BLOOD PRESSURE: 80 MMHG

## 2023-11-17 PROBLEM — M84.48XA SACRAL INSUFFICIENCY FRACTURE: Status: ACTIVE | Noted: 2023-11-17

## 2023-11-17 LAB
ANION GAP SERPL CALC-SCNC: 8 MMOL/L (ref 0–18)
BASOPHILS # BLD AUTO: 0.03 X10(3) UL (ref 0–0.2)
BASOPHILS NFR BLD AUTO: 0.2 %
BUN BLD-MCNC: 26 MG/DL (ref 9–23)
BUN/CREAT SERPL: 45.6 (ref 10–20)
CALCIUM BLD-MCNC: 9.5 MG/DL (ref 8.7–10.4)
CHLORIDE SERPL-SCNC: 105 MMOL/L (ref 98–112)
CO2 SERPL-SCNC: 24 MMOL/L (ref 21–32)
CREAT BLD-MCNC: 0.57 MG/DL
DEPRECATED RDW RBC AUTO: 45.1 FL (ref 35.1–46.3)
EGFRCR SERPLBLD CKD-EPI 2021: 94 ML/MIN/1.73M2 (ref 60–?)
EOSINOPHIL # BLD AUTO: 0 X10(3) UL (ref 0–0.7)
EOSINOPHIL NFR BLD AUTO: 0 %
ERYTHROCYTE [DISTWIDTH] IN BLOOD BY AUTOMATED COUNT: 11.9 % (ref 11–15)
GLUCOSE BLD-MCNC: 125 MG/DL (ref 70–99)
HCT VFR BLD AUTO: 38.8 %
HGB BLD-MCNC: 12.4 G/DL
IMM GRANULOCYTES # BLD AUTO: 0.15 X10(3) UL (ref 0–1)
IMM GRANULOCYTES NFR BLD: 0.9 %
LYMPHOCYTES # BLD AUTO: 0.91 X10(3) UL (ref 1–4)
LYMPHOCYTES NFR BLD AUTO: 5.7 %
MCH RBC QN AUTO: 33.1 PG (ref 26–34)
MCHC RBC AUTO-ENTMCNC: 32 G/DL (ref 31–37)
MCV RBC AUTO: 103.5 FL
MONOCYTES # BLD AUTO: 0.71 X10(3) UL (ref 0.1–1)
MONOCYTES NFR BLD AUTO: 4.4 %
NEUTROPHILS # BLD AUTO: 14.16 X10 (3) UL (ref 1.5–7.7)
NEUTROPHILS # BLD AUTO: 14.16 X10(3) UL (ref 1.5–7.7)
NEUTROPHILS NFR BLD AUTO: 88.8 %
OSMOLALITY SERPL CALC.SUM OF ELEC: 290 MOSM/KG (ref 275–295)
PLATELET # BLD AUTO: 361 10(3)UL (ref 150–450)
POTASSIUM SERPL-SCNC: 4.7 MMOL/L (ref 3.5–5.1)
RBC # BLD AUTO: 3.75 X10(6)UL
SODIUM SERPL-SCNC: 137 MMOL/L (ref 136–145)
WBC # BLD AUTO: 16 X10(3) UL (ref 4–11)

## 2023-11-17 PROCEDURE — 72110 X-RAY EXAM L-2 SPINE 4/>VWS: CPT | Performed by: PHYSICIAN ASSISTANT

## 2023-11-17 PROCEDURE — 99239 HOSP IP/OBS DSCHRG MGMT >30: CPT | Performed by: INTERNAL MEDICINE

## 2023-11-17 PROCEDURE — 99232 SBSQ HOSP IP/OBS MODERATE 35: CPT | Performed by: STUDENT IN AN ORGANIZED HEALTH CARE EDUCATION/TRAINING PROGRAM

## 2023-11-17 RX ORDER — GABAPENTIN 300 MG/1
600 CAPSULE ORAL 3 TIMES DAILY
Qty: 180 CAPSULE | Refills: 0 | Status: SHIPPED | OUTPATIENT
Start: 2023-11-17 | End: 2023-11-17

## 2023-11-17 RX ORDER — GABAPENTIN 300 MG/1
600 CAPSULE ORAL 3 TIMES DAILY
Status: DISCONTINUED | OUTPATIENT
Start: 2023-11-17 | End: 2023-11-17

## 2023-11-17 RX ORDER — CYCLOBENZAPRINE HCL 10 MG
10 TABLET ORAL 3 TIMES DAILY
Qty: 45 TABLET | Refills: 0 | Status: SHIPPED | OUTPATIENT
Start: 2023-11-17 | End: 2023-11-17

## 2023-11-17 RX ORDER — GABAPENTIN 300 MG/1
600 CAPSULE ORAL 3 TIMES DAILY
Qty: 180 CAPSULE | Refills: 0 | Status: SHIPPED | OUTPATIENT
Start: 2023-11-17 | End: 2023-12-17

## 2023-11-17 RX ORDER — CYCLOBENZAPRINE HCL 10 MG
10 TABLET ORAL 3 TIMES DAILY
Qty: 45 TABLET | Refills: 0 | Status: SHIPPED | OUTPATIENT
Start: 2023-11-17 | End: 2023-12-02

## 2023-11-17 NOTE — PLAN OF CARE
Problem: Patient Centered Care  Goal: Patient preferences are identified and integrated in the patient's plan of care  Description: Interventions:  - What would you like us to know as we care for you?  I live home alone  - Provide timely, complete, and accurate information to patient/family  - Incorporate patient and family knowledge, values, beliefs, and cultural backgrounds into the planning and delivery of care  - Encourage patient/family to participate in care and decision-making at the level they choose  - Honor patient and family perspectives and choices  Outcome: Progressing     Problem: METABOLIC/FLUID AND ELECTROLYTES - ADULT  Goal: Electrolytes maintained within normal limits  Description: INTERVENTIONS:  - Monitor labs and rhythm and assess patient for signs and symptoms of electrolyte imbalances  - Administer electrolyte replacement as ordered  - Monitor response to electrolyte replacements, including rhythm and repeat lab results as appropriate  - Fluid restriction as ordered  - Instruct patient on fluid and nutrition restrictions as appropriate  Outcome: Progressing     Problem: SKIN/TISSUE INTEGRITY - ADULT  Goal: Skin integrity remains intact  Description: INTERVENTIONS  - Assess and document risk factors for pressure ulcer development  - Assess and document skin integrity  - Monitor for areas of redness and/or skin breakdown  - Initiate interventions, skin care algorithm/standards of care as needed  Outcome: Progressing     Problem: Impaired Functional Mobility  Goal: Achieve highest/safest level of mobility/gait  Description: Interventions:  - Assess patient's functional ability and stability  - Promote increasing activity/tolerance for mobility and gait  - Educate and engage patient/family in tolerated activity level and precautions  - Recommend use of chair position in bed 3 times per day  Outcome: Progressing     Problem: Impaired Activities of Daily Living  Goal: Achieve highest/safest level of independence in self care  Description: Interventions:  - Assess ability and encourage patient to participate in ADLs to maximize function  - Promote sitting position while performing ADLs such as feeding, grooming, and bathing  - Educate and encourage patient/family in tolerated functional activity level and precautions during self-care  - Encourage patient to incorporate impaired side during daily activities to promote function  Outcome: Progressing     Problem: PAIN - ADULT  Goal: Verbalizes/displays adequate comfort level or patient's stated pain goal  Description: INTERVENTIONS:  - Encourage pt to monitor pain and request assistance  - Assess pain using appropriate pain scale  - Administer analgesics based on type and severity of pain and evaluate response  - Implement non-pharmacological measures as appropriate and evaluate response  - Consider cultural and social influences on pain and pain management  - Manage/alleviate anxiety  - Utilize distraction and/or relaxation techniques  - Monitor for opioid side effects  - Notify MD/LIP if interventions unsuccessful or patient reports new pain  - Anticipate increased pain with activity and pre-medicate as appropriate  Outcome: Progressing     Problem: SAFETY ADULT - FALL  Goal: Free from fall injury  Description: INTERVENTIONS:  - Assess pt frequently for physical needs  - Identify cognitive and physical deficits and behaviors that affect risk of falls.   - [place] fall precautions as indicated by assessment.  - Educate pt/family on patient safety including physical limitations  - Instruct pt to call for assistance with activity based on assessment  - Modify environment to reduce risk of injury  - Provide assistive devices as appropriate  - Consider OT/PT consult to assist with strengthening/mobility  - Encourage toileting schedule  Outcome: Progressing   IV solumedrol and no acute events overnight

## 2023-11-17 NOTE — CM/SW NOTE
11/17/23 1255   Discharge disposition   Expected discharge disposition subacute   Post Acute Care Provider   (Medina Zuniga)   Discharge transportation [facility]     Expected Discharge Plan:    Destination: Subacute Rehab: [facility]    Call for report to:  [phone]    Transportation provider-[facility]        DC Time: 4:00PM    RN Alexa/NADIA notified by CM of above     PASRR completed  PCS form completed  DC planner/CM to remain available for support and/or discharge planning.     Soto Coon RN, [facility]    Ext.  [phone]

## 2023-11-17 NOTE — PLAN OF CARE
Pt discharged per MD order to [facility] via ambulance. Report given to [facility] at [facility]. PIV removed. AVS, paper chart & paper scripts given to transport. All belongings taken by patient.     Problem: Patient Centered Care  Goal: Patient preferences are identified and integrated in the patient's plan of care  Description: Interventions:  - Provide timely, complete, and accurate information to patient/family  - Incorporate patient and family knowledge, values, beliefs, and cultural backgrounds into the planning and delivery of care  - Encourage patient/family to participate in care and decision-making at the level they choose  - Honor patient and family perspectives and choices  Outcome: Adequate for Discharge     Problem: METABOLIC/FLUID AND ELECTROLYTES - ADULT  Goal: Electrolytes maintained within normal limits  Description: INTERVENTIONS:  - Monitor labs and rhythm and assess patient for signs and symptoms of electrolyte imbalances  - Administer electrolyte replacement as ordered  - Monitor response to electrolyte replacements, including rhythm and repeat lab results as appropriate  - Fluid restriction as ordered  - Instruct patient on fluid and nutrition restrictions as appropriate  Outcome: Adequate for Discharge     Problem: SKIN/TISSUE INTEGRITY - ADULT  Goal: Skin integrity remains intact  Description: INTERVENTIONS  - Assess and document risk factors for pressure ulcer development  - Assess and document skin integrity  - Monitor for areas of redness and/or skin breakdown  - Initiate interventions, skin care algorithm/standards of care as needed  Outcome: Adequate for Discharge     Problem: Impaired Functional Mobility  Goal: Achieve highest/safest level of mobility/gait  Description: Interventions:  - Assess patient's functional ability and stability  - Promote increasing activity/tolerance for mobility and gait  - Educate and engage patient/family in tolerated activity level and precautions  Outcome: Adequate for Discharge     Problem: Impaired Activities of Daily Living  Goal: Achieve highest/safest level of independence in self care  Description: Interventions:  - Assess ability and encourage patient to participate in ADLs to maximize function  - Promote sitting position while performing ADLs such as feeding, grooming, and bathing  - Educate and encourage patient/family in tolerated functional activity level and precautions during self-care  Outcome: Adequate for Discharge     Problem: PAIN - ADULT  Goal: Verbalizes/displays adequate comfort level or patient's stated pain goal  Description: INTERVENTIONS:  - Encourage pt to monitor pain and request assistance  - Assess pain using appropriate pain scale  - Administer analgesics based on type and severity of pain and evaluate response  - Implement non-pharmacological measures as appropriate and evaluate response  - Consider cultural and social influences on pain and pain management  - Manage/alleviate anxiety  - Utilize distraction and/or relaxation techniques  - Monitor for opioid side effects  - Notify MD/LIP if interventions unsuccessful or patient reports new pain  - Anticipate increased pain with activity and pre-medicate as appropriate  Outcome: Adequate for Discharge     Problem: SAFETY ADULT - FALL  Goal: Free from fall injury  Description: INTERVENTIONS:  - Assess pt frequently for physical needs  - Identify cognitive and physical deficits and behaviors that affect risk of falls.   - [place] fall precautions as indicated by assessment.  - Educate pt/family on patient safety including physical limitations  - Instruct pt to call for assistance with activity based on assessment  - Modify environment to reduce risk of injury  - Provide assistive devices as appropriate  - Consider OT/PT consult to assist with strengthening/mobility  - Encourage toileting schedule  Outcome: Adequate for Discharge

## 2023-11-17 NOTE — PLAN OF CARE
Problem: METABOLIC/FLUID AND ELECTROLYTES - ADULT  Goal: Electrolytes maintained within normal limits  Description: INTERVENTIONS:  - Monitor labs and rhythm and assess patient for signs and symptoms of electrolyte imbalances  - Administer electrolyte replacement as ordered  - Monitor response to electrolyte replacements, including rhythm and repeat lab results as appropriate  - Fluid restriction as ordered  - Instruct patient on fluid and nutrition restrictions as appropriate  Outcome: Progressing     Problem: PAIN - ADULT  Goal: Verbalizes/displays adequate comfort level or patient's stated pain goal  Description: INTERVENTIONS:  - Encourage pt to monitor pain and request assistance  - Assess pain using appropriate pain scale  - Administer analgesics based on type and severity of pain and evaluate response  - Implement non-pharmacological measures as appropriate and evaluate response  - Consider cultural and social influences on pain and pain management  - Manage/alleviate anxiety  - Utilize distraction and/or relaxation techniques  - Monitor for opioid side effects  - Notify MD/LIP if interventions unsuccessful or patient reports new pain  - Anticipate increased pain with activity and pre-medicate as appropriate  Outcome: Progressing     Problem: SAFETY ADULT - FALL  Goal: Free from fall injury  Description: INTERVENTIONS:  - Assess pt frequently for physical needs  - Identify cognitive and physical deficits and behaviors that affect risk of falls. - [place] fall precautions as indicated by assessment.  - Educate pt/family on patient safety including physical limitations  - Instruct pt to call for assistance with activity based on assessment  - Modify environment to reduce risk of injury  - Provide assistive devices as appropriate  - Consider OT/PT consult to assist with strengthening/mobility  - Encourage toileting schedule  Outcome: Progressing     No acute events this shift.    Safety precautions in place, frequent nursing rounding completed, call light within reach. All questions answered and needs met.

## 2023-11-20 ENCOUNTER — INITIAL APN SNF VISIT (OUTPATIENT)
Dept: INTERNAL MEDICINE CLINIC | Facility: SKILLED NURSING FACILITY | Age: 78
End: 2023-11-20

## 2023-11-20 DIAGNOSIS — M54.59 INTRACTABLE LOW BACK PAIN: ICD-10-CM

## 2023-11-20 DIAGNOSIS — M54.9 BACK PAIN WITH RADIATION: ICD-10-CM

## 2023-11-20 DIAGNOSIS — M84.48XD SACRAL INSUFFICIENCY FRACTURE WITH ROUTINE HEALING, SUBSEQUENT ENCOUNTER: ICD-10-CM

## 2023-11-20 DIAGNOSIS — Z74.09 IMPAIRED MOBILITY AND ADLS: Primary | ICD-10-CM

## 2023-11-20 DIAGNOSIS — I10 PRIMARY HYPERTENSION: ICD-10-CM

## 2023-11-20 DIAGNOSIS — Z78.9 IMPAIRED MOBILITY AND ADLS: Primary | ICD-10-CM

## 2023-11-20 NOTE — PROGRESS NOTES
Shelly Melissa  : 1945  Age 69year old  female patient is admitted to [address] [place] [address] for rehabilitation and strengthening. [address] [place] [address] Admission: 23 - 23    Chief complaint: low back pain (chronic), HTN    HPI  29-year-old  female with known degenerative joint disease of lumbar spine, was hospitalized earlier this month with lumbar radiculopathy, and she had lumbar epidural steroid injection at L5 nerve root. She was discharged home but without significant improvement of her pain. On 23 came back with recurrent pain radiating down her left lower extremity all the way to the dorsum of her left foot. Patient was given multiple rounds of pain medications in the emergency room without significant improvement in her symptoms, she was stabilized and sent to [address] [place] [address] for rehabilitation     Patient seen as initial aprn visit, she is up in the chair, she has some pain today, states the pain radiates down her left leg. She took tylenol and flexeril and it feels better but she rates her pain 5/10 at rest. She is also feeling a little constipated. States she takes dulcolax in the morning and that usually helps. No shortness of breath or chest pain. No nausea or vomiting. Past Medical History:   Diagnosis Date    Allergic rhinitis     Bronchitis     Cough due to ACE inhibitor     HTN (hypertension)     Hyperlipidemia     Insomnia     Osteopenia     Seasonal allergies     Sinusitis     Vitamin D deficiency      No past surgical history on file.   Family History   Problem Relation Age of Onset    Heart Attack Father     Diabetes Father     Stroke Mother 80    Arthritis Mother     High Blood Pressure Mother     Musculo-skelatal Disorder Mother         osteoporosis    Cancer Brother      Social History     Socioeconomic History    Marital status:    Tobacco Use    Smoking status: Former     Types: Cigarettes     Quit date: 1985     Years since quittin.4 Smokeless tobacco: Never   Substance and Sexual Activity    Alcohol use: Yes     Alcohol/week: 0.0 standard drinks of alcohol     Comment: occ    Drug use: No    Sexual activity: Not Currently   Other Topics Concern    Caffeine Concern Yes     Comment: rare coffee    Exercise Yes     Comment: yoga,bernadette,Ambrose    Social History Narrative    The patient does not use an assistive device. .      The patient does live in a home with stairs. Social Determinants of Health     Financial Resource Strain: Low Risk  (11/14/2023)    Financial Resource Strain     Difficulty of Paying Living Expenses: Not very hard     Med Affordability: No   Food Insecurity: No Food Insecurity (11/14/2023)    Food Insecurity     Food Insecurity: Never true   Transportation Needs: No Transportation Needs (11/14/2023)    Transportation Needs     Lack of Transportation: No   Housing Stability: Low Risk  (11/14/2023)    Housing Stability     Housing Instability: No       IMMUNIZATIONS  Immunization History   Administered Date(s) Administered    Covid-19 Vaccine Moderna 100 mcg/0.5 ml 03/16/2021, 04/13/2021    FLU VAC High Dose 65 YRS & Older PRSV Free (79873) 12/01/2015, 10/11/2016, 11/27/2019, 10/21/2020    FLUAD High Dose 65 yr and older (14733) 12/20/2017, 11/14/2018    Fluzone Vaccine Medicare () 12/01/2015, 10/11/2016, 11/27/2019    Influenza 09/07/2012, 11/26/2013, 10/17/2014    Pneumococcal (Prevnar 13) 10/11/2016    Pneumovax 23 12/20/2017    TDAP 01/16/2018    Zoster Vaccine Recombinant Adjuvanted (Shingrix) 05/30/2019        ALLERGIES:  No Known Allergies    CODE STATUS:  Full Code    ADVANCED CARE PLANNING TEAM: Will need family care plan       CURRENT MEDICATIONS - reviewed and updated on [facility]       SUBJECTIVE    Patient seen as initial aprn visit, she is up in the chair, she has some pain today, states the pain radiates down her left leg.  She took tylenol and flexeril and it feels better but she rates her pain 5/10 at rest. She is also feeling a little constipated. States she takes dulcolax in the morning and that usually helps. No shortness of breath or chest pain. No nausea or vomiting. PHYSICAL EXAM:  GENERAL HEALTH:well developed, well nourished, in no apparent distress   LINES, TUBES, DRAINS:  none  SKIN: no rashes, no suspicious lesions, warm, dry  WOUND: see wound assessment,   EYES: PERRLA, EOMI, sclera anicteric, conjunctiva normal; there is no nystagmus, no drainage from eyes  HENT: normocephalic; normal nose, no nasal drainage, mucous membranes pink, moist, pharynx no exudate, no visible cerumen. NECK:supple, FROM; no JVD, no TMG, no carotid bruits   BREAST: deferred exam   RESPIRATORY:clear to percussion and auscultation  CARDIOVASCULAR: S1, S2 normal, RRR; no S3, no S4 and no murmur  ABDOMEN:  normal active BS+, soft, nondistended; no organomegaly, no masses; no bruits; nontender, no guarding, no rebound tenderness. :Deferred  LYMPHATIC:no lymphedema  MUSCULOSKELETAL: no acute synovitis upper or lower extremity   EXTREMITIES/VASCULAR:no cyanosis, clubbing or edema  NEUROLOGIC:intact; no sensorimotor deficit and follows commands  PSYCHIATRIC: alert and oriented x 3; affect appropriate       MEDICAL DECISION MAKING  capable    DIAGNOSTICS REVIEWED AT THIS VISIT:  [address] [place] [address] medical records    SEE PLAN BELOW  Intractable low back pain with radiation to left lower extremity  Spinal Stenosis   Chronic Lumbar Pain    Sacral insufficiency fractures  - Xray on 11/17/23 shows scoliosis with multilevel moderate to severe lumbar spine changes  - Past MRI on 11/09/23 shows Bilateral sacral insufficiency fractures. Degenerative disc and facet disease throughout the lumbar spine, most prominent at L3-L4 and L5-S1, where there is severe narrowing of the bilateral neural foramen. There is bony encroachment upon the exiting bilateral L3 and L5 nerve roots. Moderate narrowing of the canal from L3 through S1.    Type 1 endplate degenerative changes at L3-L4.   - s/p steroid injection 11/11 at L5-S1  - Evaluated by neurosurgery, recommend no acute surgical interventions at this time. Possibly acute worsening related to insufficiency fxs. Rec cont PT/OT. Anti inflammatory medications. Consider bracing if pain fails to improve. Pt to follow up as outpt  - continue tylenol 650mg Q6PRN  - continue Cyclobenzaprine 10mg TID  - continue lidocain patch daily   - continue ASA 81mg daily  - PT/OT  - CBC and BMP weekly and PRN   - monitor        HTN  - Qshift vitals  - amlodipine 2.5mg daily - on hold   - monitor       FOLLOW UP APPOINTMENTS  Future Appointments   Date Time Provider Ashley Antonio   12/14/2023 10:30 AM Elza Gonzalez MD  [address]        This is a 60 minute visit and greater than 50% of the time was spent counseling the patient and/or coordinating care. Patient is a full code    Note to patient: The [facility] makes medical notes like these available to patients in the interest of transparency. However, this is a medical document intended as peer to peer communication. It is written in medical language and may contain abbreviations or verbiage that are unfamiliar. It may appear blunt or direct. Medical documents are intended to carry relevant information, facts as evident, and the clinical opinion of the practitioner who signs the document.      Eduard Rizvi, APRN  11/20/23

## 2023-11-22 ENCOUNTER — SNF VISIT (OUTPATIENT)
Dept: INTERNAL MEDICINE CLINIC | Facility: SKILLED NURSING FACILITY | Age: 78
End: 2023-11-22

## 2023-11-22 VITALS
OXYGEN SATURATION: 97 % | DIASTOLIC BLOOD PRESSURE: 67 MMHG | TEMPERATURE: 98 F | SYSTOLIC BLOOD PRESSURE: 108 MMHG | RESPIRATION RATE: 16 BRPM | HEART RATE: 72 BPM

## 2023-11-22 DIAGNOSIS — M54.59 INTRACTABLE LOW BACK PAIN: ICD-10-CM

## 2023-11-22 DIAGNOSIS — M54.16 LEFT LUMBAR RADICULOPATHY: ICD-10-CM

## 2023-11-22 DIAGNOSIS — Z78.9 IMPAIRED MOBILITY AND ADLS: ICD-10-CM

## 2023-11-22 DIAGNOSIS — Z74.09 IMPAIRED MOBILITY AND ADLS: ICD-10-CM

## 2023-11-22 DIAGNOSIS — R53.1 WEAKNESS: ICD-10-CM

## 2023-11-22 DIAGNOSIS — I10 PRIMARY HYPERTENSION: ICD-10-CM

## 2023-11-22 DIAGNOSIS — M84.48XD SACRAL INSUFFICIENCY FRACTURE WITH ROUTINE HEALING, SUBSEQUENT ENCOUNTER: Primary | ICD-10-CM

## 2023-11-22 RX ORDER — ACETAMINOPHEN 325 MG/1
650 TABLET ORAL EVERY 6 HOURS PRN
COMMUNITY

## 2023-11-22 RX ORDER — HYDROCODONE BITARTRATE AND ACETAMINOPHEN 5; 325 MG/1; MG/1
1 TABLET ORAL EVERY 12 HOURS PRN
COMMUNITY

## 2023-11-22 RX ORDER — SENNOSIDES 8.6 MG
8.6 TABLET ORAL DAILY
COMMUNITY

## 2023-11-27 ENCOUNTER — SNF VISIT (OUTPATIENT)
Dept: INTERNAL MEDICINE CLINIC | Facility: SKILLED NURSING FACILITY | Age: 78
End: 2023-11-27

## 2023-11-27 DIAGNOSIS — R20.2 NUMBNESS AND TINGLING: ICD-10-CM

## 2023-11-27 DIAGNOSIS — R20.0 NUMBNESS AND TINGLING: ICD-10-CM

## 2023-11-27 DIAGNOSIS — Z78.9 IMPAIRED MOBILITY AND ADLS: ICD-10-CM

## 2023-11-27 DIAGNOSIS — Z74.09 IMPAIRED MOBILITY AND ADLS: ICD-10-CM

## 2023-11-27 DIAGNOSIS — M54.59 INTRACTABLE LOW BACK PAIN: ICD-10-CM

## 2023-11-27 DIAGNOSIS — M54.9 BACK PAIN WITH RADIATION: Primary | ICD-10-CM

## 2023-11-27 DIAGNOSIS — I67.9 CEREBROVASCULAR DISEASE: ICD-10-CM

## 2023-11-27 NOTE — PROGRESS NOTES
Gely Miller, 12/29/1945, 69year old, female is being discharged from [address] [place] [address] to home      [address]    Date of Admission to [address] [place] [address]: 11/17/23    Date of Discharge from [address] [place] [address]: 11/29/23                             Admitting Diagnoses: low back pain (chronic), HTN     Reason for Admission to [facility]: Rehabilitation and strengthening      PHYSICAL EXAM:  GENERAL HEALTH:well developed, well nourished, in no apparent distress   LINES, TUBES, DRAINS:  none  SKIN: no rashes, no suspicious lesions, warm, dry  WOUND: see wound assessment,   EYES: PERRLA, EOMI, sclera anicteric, conjunctiva normal; there is no nystagmus, no drainage from eyes  HENT: normocephalic; normal nose, no nasal drainage, mucous membranes pink, moist, pharynx no exudate, no visible cerumen. NECK:supple, FROM; no JVD, no TMG, no carotid bruits   BREAST: deferred exam   RESPIRATORY:clear to percussion and auscultation  CARDIOVASCULAR: S1, S2 normal, RRR; no S3, no S4 and no murmur  ABDOMEN:  normal active BS+, soft, nondistended; no organomegaly, no masses; no bruits; nontender, no guarding, no rebound tenderness. :Deferred  LYMPHATIC:no lymphedema  MUSCULOSKELETAL: no acute synovitis upper or lower extremity   EXTREMITIES/VASCULAR:no cyanosis, clubbing or edema  NEUROLOGIC:intact; no sensorimotor deficit and follows commands  PSYCHIATRIC: alert and oriented x 3; affect appropriate     CURRENT MANAGEMENT AT [facility]  Intractable low back pain with radiation to left lower extremity  Spinal Stenosis   Chronic Lumbar Pain    Sacral insufficiency fractures  - Xray on 11/17/23 shows scoliosis with multilevel moderate to severe lumbar spine changes  - Past MRI on 11/09/23 shows Bilateral sacral insufficiency fractures. Degenerative disc and facet disease throughout the lumbar spine, most prominent at L3-L4 and L5-S1, where there is severe narrowing of the bilateral neural foramen.   There is bony encroachment upon the exiting bilateral L3 and L5 nerve roots. Moderate narrowing of the canal from L3 through S1. Type 1 endplate degenerative changes at L3-L4.   - s/p steroid injection 11/11 at L5-S1  - Evaluated by neurosurgery, recommend no acute surgical interventions at this time. Possibly acute worsening related to insufficiency fxs. Rec cont PT/OT. Anti inflammatory medications. Consider bracing if pain fails to improve. Pt to follow up as outpt  - continue tylenol 650mg Q6PRN  - continue Cyclobenzaprine 10mg TID  - continue lidocain patch daily   - continue ASA 81mg daily  - PT/OT  - CBC and BMP weekly and PRN   - monitor      HTN  - Qshift vitals  - amlodipine 2.5mg daily - on hold   - monitor       Medication Reconciliation Completed:  Yes - All medications and side effects reviewed with patient    FOLLOW UP APPOINTMENTS  Future Appointments   Date Time Provider Ashley Marisela   12/14/2023 10:30 AM Higinio Mora MD  [address]        This is a 35 minute visit and greater than 50% of the time was spent counseling the patient and/or coordinating care. Note to patient: The [facility] makes medical notes like these available to patients in the interest of transparency. However, this is a medical document intended as peer to peer communication. It is written in medical language and may contain abbreviations or verbiage that are unfamiliar. It may appear blunt or direct. Medical documents are intended to carry relevant information, facts as evident, and the clinical opinion of the practitioner who signs the document.      Jayy Santamaria, APRN  11/27/2023

## 2023-12-01 ENCOUNTER — TELEPHONE (OUTPATIENT)
Dept: INTERNAL MEDICINE CLINIC | Facility: CLINIC | Age: 78
End: 2023-12-01

## 2023-12-01 NOTE — TELEPHONE ENCOUNTER
Kiara Berrios from [facility] [place] the office, asking if Dr. Mian Delgado would sign off on home health orders for this patient. Verbal can be left at [phone], Ext. [phone]. A confidential line.

## 2023-12-04 ENCOUNTER — TELEPHONE (OUTPATIENT)
Dept: INTERNAL MEDICINE CLINIC | Facility: CLINIC | Age: 78
End: 2023-12-04

## 2023-12-04 ENCOUNTER — OFFICE VISIT (OUTPATIENT)
Dept: INTERNAL MEDICINE CLINIC | Facility: CLINIC | Age: 78
End: 2023-12-04
Payer: MEDICARE

## 2023-12-04 VITALS
SYSTOLIC BLOOD PRESSURE: 120 MMHG | HEART RATE: 76 BPM | HEIGHT: 60 IN | OXYGEN SATURATION: 96 % | DIASTOLIC BLOOD PRESSURE: 60 MMHG | WEIGHT: 109.81 LBS | BODY MASS INDEX: 21.56 KG/M2

## 2023-12-04 DIAGNOSIS — L89.312 PRESSURE ULCER OF RIGHT BUTTOCK, STAGE 2 (HCC): ICD-10-CM

## 2023-12-04 DIAGNOSIS — L89.152 PRESSURE ULCER OF SACRAL REGION, STAGE 2 (HCC): ICD-10-CM

## 2023-12-04 DIAGNOSIS — L89.322 PRESSURE ULCER OF LEFT BUTTOCK, STAGE 2 (HCC): ICD-10-CM

## 2023-12-04 DIAGNOSIS — Z74.09 IMPAIRED MOBILITY AND ADLS: ICD-10-CM

## 2023-12-04 DIAGNOSIS — M54.59 INTRACTABLE LOW BACK PAIN: Primary | ICD-10-CM

## 2023-12-04 DIAGNOSIS — Z78.9 IMPAIRED MOBILITY AND ADLS: ICD-10-CM

## 2023-12-04 DIAGNOSIS — I10 HYPERTENSION, UNSPECIFIED TYPE: ICD-10-CM

## 2023-12-04 RX ORDER — AMLODIPINE BESYLATE 5 MG/1
5 TABLET ORAL DAILY
Qty: 90 TABLET | Refills: 0 | Status: SHIPPED | OUTPATIENT
Start: 2023-12-04 | End: 2024-11-28

## 2023-12-04 RX ORDER — AMLODIPINE BESYLATE 5 MG/1
5 TABLET ORAL DAILY
COMMUNITY
End: 2023-12-04

## 2023-12-04 RX ORDER — MELOXICAM 15 MG/1
15 TABLET ORAL DAILY
Qty: 30 TABLET | Refills: 0 | Status: SHIPPED | OUTPATIENT
Start: 2023-12-04

## 2023-12-04 NOTE — TELEPHONE ENCOUNTER
Nurse Hailey Mackenzie called on behalf of patient requesting wound care supplies for sacral region. The want direct instruction for wound care including how to place it and how often for her family members.     FAX : [phone]

## 2023-12-08 ENCOUNTER — TELEPHONE (OUTPATIENT)
Dept: INTERNAL MEDICINE CLINIC | Facility: CLINIC | Age: 78
End: 2023-12-08

## 2023-12-08 NOTE — TELEPHONE ENCOUNTER
Patient saw Preet Robles NP on 12/04/2023 and received two referrals at that time:    Gloria Wray [facility]. Referral - Dipesh     Patient has questions regarding these referrals; please call patient to clarify:      [address]: Follow-up phone call to answer questions about an appointment she has with neurosurgery on 12/1423 with Dr. Christopher Burton. Encourage patient to keep that appointment as she is hoping for a follow-up lumbar injection. Patient had some benefit but does not have benefit on the left side. If patient does not want to see , she can follow-up with Dr. Peter Lowery, whom she has seen in the past.  There is a physiatry referral in place. Patient also had questions about wound care. Is currently getting dressings with RN through [facility]. There is a referral in place for follow-up wound care at the [facility] wound clinic. Instructed patient to make follow-up appointment. All questions answered.

## 2023-12-12 ENCOUNTER — TELEPHONE (OUTPATIENT)
Dept: SURGERY | Facility: CLINIC | Age: 78
End: 2023-12-12

## 2023-12-14 ENCOUNTER — OFFICE VISIT (OUTPATIENT)
Dept: SURGERY | Facility: CLINIC | Age: 78
End: 2023-12-14
Payer: MEDICARE

## 2023-12-14 VITALS — WEIGHT: 103.63 LBS | BODY MASS INDEX: 20.35 KG/M2 | HEIGHT: 60 IN

## 2023-12-14 DIAGNOSIS — M84.48XD SACRAL INSUFFICIENCY FRACTURE WITH ROUTINE HEALING, SUBSEQUENT ENCOUNTER: Primary | ICD-10-CM

## 2023-12-14 PROCEDURE — 1159F MED LIST DOCD IN RCRD: CPT | Performed by: STUDENT IN AN ORGANIZED HEALTH CARE EDUCATION/TRAINING PROGRAM

## 2023-12-14 PROCEDURE — 99214 OFFICE O/P EST MOD 30 MIN: CPT | Performed by: STUDENT IN AN ORGANIZED HEALTH CARE EDUCATION/TRAINING PROGRAM

## 2023-12-14 PROCEDURE — 1160F RVW MEDS BY RX/DR IN RCRD: CPT | Performed by: STUDENT IN AN ORGANIZED HEALTH CARE EDUCATION/TRAINING PROGRAM

## 2023-12-14 PROCEDURE — 1126F AMNT PAIN NOTED NONE PRSNT: CPT | Performed by: STUDENT IN AN ORGANIZED HEALTH CARE EDUCATION/TRAINING PROGRAM

## 2023-12-14 PROCEDURE — 3008F BODY MASS INDEX DOCD: CPT | Performed by: STUDENT IN AN ORGANIZED HEALTH CARE EDUCATION/TRAINING PROGRAM

## 2023-12-14 NOTE — PROGRESS NOTES
[facility]  Neurological Surgery Established Patient Clinic Note    Justino Starkey  12/29/1945  [id number]  PCP: Leslee Milner MD  Referring Provider: Bess Roblero MD    REASON FOR VISIT:  Intractable low back pain     HISTORY OF PRESENT ILLNESS 11/16/2023 (from [place], [employer]):  Justino Starkey is a(n) 69year old female with a pertinent PMH of HTN and dyslipidemia. The patient arrived to the ED for acute on chronic low back pain with radicular symptoms. The patient was admitted prior on 11/8/2023 for back pain with radiating lower extremity pain. Patient underwent an injection as noted below:     Status post 11/10/2023: L5/S1 lumbar interlaminar epidural steroid injection. The patient was discharged home on Norco as well as Lidoderm and ketorolac. While inpatient, she received an MRI of the lumbar spine which noted bilateral sacral insufficiency fractures. Patient with progression of her pain, which brought her back to the ED. Prior to the above admission, the patient was in the ER as well as urgent care. Onset of symptoms approximately 2 weeks prior. With low back pain and bilateral posterior thigh pain, stopping at the knees. Prior to injection, the pain was so severe that she had to crawl around her home or hold onto the walls. The pain would cause her legs to buckle and give out. She endorses no physical weakness but weakness secondary to pain. After the injection, patient states improvement of her right posterior thigh pain but progression of her left lower extremity pain. The pain now progresses down the left posterior thigh to the calf to the foot. Associated numbness as well. Mild intermittent tingling. No bladder/bowel incontinence/retention. Pure wick in place secondary to patient being unable to ambulate to the bathroom. No neck pain or radiating arm pain. No hand numbness, tingling or weakness. No poor  strength or dropping of items.  No fine motor difficulties. She states \"wobbly\" balance, she is unsure what is contributing to this. INTERVAL HISTORY 12/14/2023:   Ms. Hannah Solares presents to clinic today for hospital follow-up. She was initially evaluated in the hospital on 11/16/2023. She was found to have sacral insufficiency fractures likely related to osteoporosis. She has never been diagnosed with this or has had a DEXA scan. Today, fortunately, she reports significant improvement of her pain. She did discharge to rehab after the hospital and was there for several weeks. She is continuing a therapy regimen. Unfortunately, she also developed pressure ulcers to her buttocks. This is being addressed. They do show significant improvement on the most recent pictures. Her caregiver is present today and has concerns regarding the fact that she has lost a lot of weight, as well as muscle. She also inquired about the possibility of diagnosing and treating osteoporosis. She was supposed to obtain standing radiographs today, but did not obtain them. PAST MEDICAL HISTORY:  Past Medical History:   Diagnosis Date    Allergic rhinitis     Bronchitis     Cough due to ACE inhibitor     HTN (hypertension)     Hyperlipidemia     Insomnia     Osteopenia     Seasonal allergies     Sinusitis     Vitamin D deficiency      PAST SURGICAL HISTORY:  No past surgical history on file. FAMILY HISTORY:  family history includes Arthritis in her mother; Cancer in her brother; Diabetes in her father; Heart Attack in her father; High Blood Pressure in her mother; Musculo-skelatal Disorder in her mother; Stroke (age of onset: 80) in her mother. SOCIAL HISTORY:   reports that she quit smoking about 38 years ago. Her smoking use included cigarettes. She has never used smokeless tobacco. She reports current alcohol use. She reports that she does not use drugs.     ALLERGIES:  Allergies   Allergen Reactions    Milk-Related Compounds DIARRHEA     Can not tolerated Protein supplements with milk/whey     MEDICATIONS:  Current Outpatient Medications on File Prior to Visit   Medication Sig Dispense Refill    amLODIPine 5 MG Oral Tab Take 1 tablet (5 mg total) by mouth daily. 90 tablet 0    Meloxicam 15 MG Oral Tab Take 1 tablet (15 mg total) by mouth daily. Can use 1/2 to 1 tab as antiinflammatory 30 tablet 0    acetaminophen 325 MG Oral Tab Take 2 tablets (650 mg total) by mouth every 6 (six) hours as needed for Pain. HYDROcodone-acetaminophen 5-325 MG Oral Tab Take 1 tablet by mouth every 12 (twelve) hours as needed for Pain.      sennosides 8.6 MG Oral Tab Take 1 tablet (8.6 mg total) by mouth daily. gabapentin 300 MG Oral Cap Take 2 capsules (600 mg total) by mouth 3 (three) times daily. 180 capsule 0    lidocaine-menthol 4-1 % External Patch Place 1 patch onto the skin daily. 30 patch 0    aspirin 81 MG Oral Tab Take 1 tablet (81 mg total) by mouth daily. No current facility-administered medications on file prior to visit. REVIEW OF SYSTEMS:  All other systems were reviewed and were negative except for those previously mentioned in the HPI    PHYSICAL EXAMINATION:  General: No acute distress. Respiratory: Non-labored respirations bilaterally. No audible wheezing  Cardiovascular: Extremities warm and well-perfused. Abdomen: Soft, nontender, nondistended. Musculoskeletal: Moves all extremities well, symmetrically. Extremities: No edema.     NEUROLOGIC EXAMINATION:  Mental status: Alert and oriented x 3  Speech: Clear, fluent  Cranial nerves: PERRLA, EOMI, face symmetric, with normal strength and sensation, tongue and palate midline, SCM 5/5 bilaterally  Motor:     RIGHT  Delt 5/5   Bic 5/5  Tri 5/5   HI 5/5    5/5  IP 5/5   Quad 5/5   Ham 5/5   AT 5/5   EHL 5/5 Aram 5/5     LEFT    Delt 5/5   Bic 5/5  Tri 5/5   HI 5/5    5/5  IP 5/5   Quad 5/5   Ham 5/5   AT 5/5   EHL 5/5 Aram 5/5   No pronator drift  Tone: Normal  Atrophy/Fasciculations: None  Sensation: Normal to light touch, symmetric, no neglect  Cerebellar: Normal finger nose finger  Gait: Normal, nondistressed heel toe tandem gait      Reflexes: 2+ throughout, symmetric, no Izaiah's    IMAGING:  No new neurosurgical imaging for review she was supposed to obtain    ASSESSMENT:  Ms. Miguel Nicole was admitted with back pain with radiation, primarily to the left leg back in November 2023. She was admitted to the hospital for pain control after presenting to the emergency room for a second time within 2 weeks. She was last admitted on 11/8/2023. At that time at L5-S1 epidural steroid injection was administered. The patient reported improvement only in the right lower extremity posterior thigh pain but the left lower extremity started hurting after that. Her left sided pain traveled down the posterior aspect of her thigh down to her knee. She had slight weakness of the her left EHL. While her lumbar MRI demonstrated significant spondylotic changes throughout the lumbar spine, most severe areas of stenosis were actually on the right side and did not fit the dermatomal distribution of the pain that was described by the patient. On the right side she had moderate to severe foraminal stenosis at L2-3, and L5-S1, mild foraminal stenosis at L1-2, and L3-4. On the left side she had mild foraminal stenosis at L1-2, L2-3, L3-4, and moderate to severe at L5-S1. While her findings on the left side L5-S1 could explain some of her presenting complaints, these appeared chronic and unlikely to cause an acute presentation like. Her CT abdomen did demonstrate sacral insufficiency fractures and her MRI demonstrated edema surrounding the sacrum. Therefore, I believed that this provided a better explanation for her acute presentation.   While she was in the hospital, we discussed at length the treatment for such a thing would be conservative with anti-inflammatory medications, therapy, and possibly bracing if the pain was significant despite other measures. Today, thankfully, she reports that the pain is significantly better now that she has completed a stent and physical therapy. She denies any pain radiating down to her legs. She is still, however, on gabapentin. Her caregiver is worried that this may be contributing to cognitive impairment. Given the absence of radicular symptoms, I instructed her to start reducing the dosing slowly to hopefully wean off of this medication. I did advise to discuss this with her primary care doctor. Given the unfortunate development of having pressure ulcers, would not recommend bracing, especially in the setting of improvement of her pain. I do recommend working up for osteoporosis, as clinically she meets criteria given the atraumatic development of sacral insufficiency fractures. She may benefit from osteoporosis treatment. Plan:  -RTC as needed    Lul Perez MD  Neurological [address] [place] [address] [address] [place] [address]  [phone]  Pager [phone]  12/14/2023 11:19 AM      This note was created using a voice-recognition transcribing system. Incorrect words or phrases may have been missed during proofreading. Please interpret accordingly. Total Time    Established Patient Total Time       30  minutes. Activities       Preparing to see the patient (chart/tests/imaging review). Obtaining and/or reviewing separately obtained history. Performing a medically appropriate examination and/or evaluation. Counseling and educating the patient/family/caregiver. Ordering medications, tests, or procedures. Referring and communicating with other health care professionals (when not separately reported). Documenting clincal information in the electronic or other health record. Independently interpreting results (not separately reported).     Communicating results to the patient/family/caregiver. Care coordination (not separately reported).

## 2023-12-15 ENCOUNTER — PATIENT MESSAGE (OUTPATIENT)
Dept: INTERNAL MEDICINE CLINIC | Facility: CLINIC | Age: 78
End: 2023-12-15

## 2023-12-15 ENCOUNTER — TELEPHONE (OUTPATIENT)
Dept: INTERNAL MEDICINE CLINIC | Facility: CLINIC | Age: 78
End: 2023-12-15

## 2023-12-15 DIAGNOSIS — M84.48XS SACRAL INSUFFICIENCY FRACTURE, SEQUELA: Primary | ICD-10-CM

## 2023-12-18 ENCOUNTER — TELEPHONE (OUTPATIENT)
Dept: INTERNAL MEDICINE CLINIC | Facility: CLINIC | Age: 78
End: 2023-12-18

## 2023-12-18 NOTE — TELEPHONE ENCOUNTER
Patient said she was in the hospital at [facility].  The doctor told her that she should reach out to her PCP to have an Osteopetrosis test.  And should be on medication for Osteoporosis, as her condition is bad.

## 2023-12-22 ENCOUNTER — TELEPHONE (OUTPATIENT)
Dept: INTERNAL MEDICINE CLINIC | Facility: CLINIC | Age: 78
End: 2023-12-22

## 2023-12-22 NOTE — TELEPHONE ENCOUNTER
Notified that a Bone density test was ordered on 12/15/23. This is also called a DEXA scan. Instructed to call [phone] to schedule this.

## 2024-02-27 ENCOUNTER — OFFICE VISIT (OUTPATIENT)
Dept: FAMILY MEDICINE CLINIC | Facility: CLINIC | Age: 79
End: 2024-02-27
Payer: MEDICARE

## 2024-02-27 VITALS
OXYGEN SATURATION: 97 % | HEIGHT: 60 IN | DIASTOLIC BLOOD PRESSURE: 69 MMHG | WEIGHT: 107 LBS | HEART RATE: 98 BPM | SYSTOLIC BLOOD PRESSURE: 153 MMHG | BODY MASS INDEX: 21.01 KG/M2 | RESPIRATION RATE: 17 BRPM

## 2024-02-27 DIAGNOSIS — G56.03 BILATERAL CARPAL TUNNEL SYNDROME: ICD-10-CM

## 2024-02-27 DIAGNOSIS — I10 ESSENTIAL HYPERTENSION: ICD-10-CM

## 2024-02-27 DIAGNOSIS — J32.1 CHRONIC FRONTAL SINUSITIS: ICD-10-CM

## 2024-02-27 DIAGNOSIS — Z78.0 POST-MENOPAUSAL: Primary | ICD-10-CM

## 2024-02-27 PROBLEM — G56.00 CARPAL TUNNEL SYNDROME: Status: ACTIVE | Noted: 2024-02-27

## 2024-02-27 PROBLEM — M48.062 SPINAL STENOSIS OF LUMBAR REGION WITH NEUROGENIC CLAUDICATION: Status: ACTIVE | Noted: 2024-02-27

## 2024-02-27 PROBLEM — M47.816 LUMBAR SPONDYLOSIS: Status: ACTIVE | Noted: 2024-02-27

## 2024-02-27 PROBLEM — M54.59 INTRACTABLE LOW BACK PAIN: Status: RESOLVED | Noted: 2023-11-14 | Resolved: 2024-02-27

## 2024-02-27 PROCEDURE — 99203 OFFICE O/P NEW LOW 30 MIN: CPT | Performed by: FAMILY MEDICINE

## 2024-02-27 NOTE — PROGRESS NOTES
Subjective:   Catia Malik is a 78 year old female who presents for Establish Care (New pt establishing primary /Spinal stenosis given spinal injection 11/10/23 would like to discuss further treatment )   Hand pain resulting from her carpal tunnel.  Sees chriopractor.  Hypertension    History/Other:    Chief Complaint Reviewed and Verified  Nursing Notes Reviewed and   Verified  Tobacco Reviewed  Allergies Reviewed  Medications Reviewed    Problem List Reviewed  Medical History Reviewed  Surgical History   Reviewed  Family History Reviewed  Social History Reviewed         Tobacco:  She smoked tobacco in the past but quit greater than 12 months ago.  Social History    Tobacco Use      Smoking status: Former        Types: Cigarettes        Quit date: 1985        Years since quittin.7      Smokeless tobacco: Never       Current Outpatient Medications   Medication Sig Dispense Refill    amLODIPine 5 MG Oral Tab Take 1 tablet (5 mg total) by mouth daily. 90 tablet 0    acetaminophen 325 MG Oral Tab Take 2 tablets (650 mg total) by mouth every 6 (six) hours as needed for Pain. (Patient not taking: Reported on 2024)      HYDROcodone-acetaminophen 5-325 MG Oral Tab Take 1 tablet by mouth every 12 (twelve) hours as needed for Pain. (Patient not taking: Reported on 2024)      sennosides 8.6 MG Oral Tab Take 1 tablet (8.6 mg total) by mouth daily. (Patient not taking: Reported on 2024)      lidocaine-menthol 4-1 % External Patch Place 1 patch onto the skin daily. (Patient not taking: Reported on 2024) 30 patch 0    aspirin 81 MG Oral Tab Take 1 tablet (81 mg total) by mouth daily. (Patient not taking: Reported on 2024)           Review of Systems:  Review of Systems   Constitutional: Negative.    Respiratory: Negative.     Cardiovascular: Negative.    Musculoskeletal:  Positive for arthralgias and back pain.   Neurological:  Negative for dizziness and headaches.         Objective:    /69 (BP Location: Right arm, Patient Position: Sitting, Cuff Size: adult)   Pulse 98   Resp 17   Ht 5' (1.524 m)   Wt 107 lb (48.5 kg)   SpO2 97%   BMI 20.90 kg/m²  Estimated body mass index is 20.9 kg/m² as calculated from the following:    Height as of this encounter: 5' (1.524 m).    Weight as of this encounter: 107 lb (48.5 kg).  Physical Exam  Vitals reviewed.   Constitutional:       Appearance: Normal appearance.   Cardiovascular:      Rate and Rhythm: Normal rate and regular rhythm.      Heart sounds: Normal heart sounds.   Pulmonary:      Breath sounds: Normal breath sounds.   Abdominal:      Palpations: Abdomen is soft.   Musculoskeletal:      Lumbar back: Tenderness present. Normal range of motion.      Right lower leg: No edema.      Left lower leg: No edema.      Comments: lumbar   Neurological:      Mental Status: She is alert.      Sensory: Sensation is intact.      Motor: Motor function is intact.           Assessment & Plan:   1. Post-menopausal (Primary)  -     XR DEXA BONE DENSITOMETRY (CPT=77080); Future; Expected date: 02/27/2024  2. Bilateral carpal tunnel syndrome  -     Ortho Referral - [place] ([facility])  3. Chronic frontal sinusitis  4. Essential hypertension    On exam, good lumbar ROM and no pain.  Pain develops down her leg with more persistent activity.  She has had an injection and physical therapy in the past.  Neurosurge. Consult in the past.  She may for a short term use ibuprofen for pain.  Not too often or regular.  Her BP well controlled - CPM.  She can use neti pot and flonase for her sinus issue. Does not tolerate antihistamine.       Return in about 6 months (around 8/27/2024).    Shailesh Yao DO, 2/27/2024, 12:52 PM

## 2024-02-28 ENCOUNTER — HOSPITAL ENCOUNTER (OUTPATIENT)
Dept: BONE DENSITY | Age: 79
Discharge: HOME | End: 2024-02-28
Attending: FAMILY MEDICINE
Payer: MEDICARE

## 2024-02-28 DIAGNOSIS — Z78.0 POST-MENOPAUSAL: ICD-10-CM

## 2024-02-28 PROCEDURE — 77080 DXA BONE DENSITY AXIAL: CPT | Performed by: FAMILY MEDICINE

## 2024-03-05 ENCOUNTER — TELEPHONE (OUTPATIENT)
Dept: FAMILY MEDICINE CLINIC | Facility: CLINIC | Age: 79
End: 2024-03-05

## 2024-03-05 NOTE — TELEPHONE ENCOUNTER
Pt called in and would like a call with results of bone density scan taken on 2/28/24. Please advise

## 2024-03-06 NOTE — TELEPHONE ENCOUNTER
Patient called back to follow up on Test results for Bone Density test. Patient states she read it and appears some information on medication she should be taking.

## 2024-03-06 NOTE — TELEPHONE ENCOUNTER
please see pt message below. Thank you  Pt was left a detailed message Dr. Yao is not in the office today and we are still waiting for his recommendations on her test results and he will be back in the office tomorrow.

## 2024-03-08 RX ORDER — ALENDRONATE SODIUM 70 MG/1
70 TABLET ORAL
Qty: 4 TABLET | Refills: 1 | Status: SHIPPED | OUTPATIENT
Start: 2024-03-08

## 2024-03-14 ENCOUNTER — TELEPHONE (OUTPATIENT)
Dept: INTERNAL MEDICINE CLINIC | Facility: CLINIC | Age: 79
End: 2024-03-14

## 2024-03-18 ENCOUNTER — NURSE TRIAGE (OUTPATIENT)
Dept: FAMILY MEDICINE CLINIC | Facility: CLINIC | Age: 79
End: 2024-03-18

## 2024-03-18 NOTE — TELEPHONE ENCOUNTER
Action Requested: Summary for Provider     []  Critical Lab, Recommendations Needed  [x] Need Additional Advice  []   FYI    []   Need Orders  [] Need Medications Sent to Pharmacy  []  Other     SUMMARY: Patient wanted to speak with Dr Yao about symptoms that started after taking alendronate.    Took second dose yesterday and at 8 pm developed pain in upper back along with pain in right leg.  Reports it is hard to walk and hard to breath because of the pain. No known injury of muscle strain.  At time of call denies taurus chest pain or shortness of breath  She tried ibuprofen but had pain all night.  Patient believes the pan is a side effect of alendronate.  Advised to make office visit, offered appointment tomorrow but patient declined, \"wanted to talk to Dr Yao\" about the pain.  Dr Yao, please advise if alendronate is possibly a cause of back and leg pain?    Reason for call: Acute  Back pain and pain in right leg  Onset: last evening.    Spoke with patient,  verified.   No audible dyspnea noted.    Reason for Disposition   Age > 50 and no history of prior similar back pain    Protocols used: Back Pain-A-[place]

## 2024-03-25 PROBLEM — L89.46: Status: ACTIVE | Noted: 2023-11-22

## 2024-03-25 PROBLEM — G47.00 INSOMNIA, UNSPECIFIED: Status: ACTIVE | Noted: 2023-11-17

## 2024-03-25 PROBLEM — J40 BRONCHITIS, NOT SPECIFIED AS ACUTE OR CHRONIC: Status: ACTIVE | Noted: 2023-11-17

## 2024-03-25 PROBLEM — M85.80 OTHER SPECIFIED DISORDERS OF BONE DENSITY AND STRUCTURE, UNSPECIFIED SITE: Status: ACTIVE | Noted: 2023-11-17

## 2024-03-25 PROBLEM — M81.0 AGE-RELATED OSTEOPOROSIS WITHOUT CURRENT PATHOLOGICAL FRACTURE: Status: ACTIVE | Noted: 2023-11-17

## 2024-03-25 PROBLEM — Z28.311 PARTIALLY VACCINATED FOR COVID-19: Status: ACTIVE | Noted: 2023-11-17

## 2024-03-25 PROBLEM — R29.898 OTHER SYMPTOMS AND SIGNS INVOLVING THE MUSCULOSKELETAL SYSTEM: Status: ACTIVE | Noted: 2023-11-17

## 2024-03-25 PROBLEM — L89.312 PRESSURE ULCER OF RIGHT BUTTOCK, STAGE 2 (HCC): Status: ACTIVE | Noted: 2023-11-17

## 2024-03-25 PROBLEM — R27.8 OTHER LACK OF COORDINATION: Status: ACTIVE | Noted: 2023-11-17

## 2024-03-25 PROBLEM — M19.90 UNSPECIFIED OSTEOARTHRITIS, UNSPECIFIED SITE: Status: ACTIVE | Noted: 2023-11-17

## 2024-03-25 PROBLEM — E43 UNSPECIFIED SEVERE PROTEIN-CALORIE MALNUTRITION (HCC): Status: ACTIVE | Noted: 2023-11-17

## 2024-03-25 PROBLEM — E78.5 HYPERLIPIDEMIA, UNSPECIFIED: Status: ACTIVE | Noted: 2023-11-17

## 2024-03-26 ENCOUNTER — OFFICE VISIT (OUTPATIENT)
Dept: FAMILY MEDICINE CLINIC | Facility: CLINIC | Age: 79
End: 2024-03-26
Payer: MEDICARE

## 2024-03-26 VITALS
DIASTOLIC BLOOD PRESSURE: 65 MMHG | WEIGHT: 109.38 LBS | TEMPERATURE: 98 F | OXYGEN SATURATION: 98 % | BODY MASS INDEX: 21.47 KG/M2 | HEART RATE: 77 BPM | HEIGHT: 60 IN | SYSTOLIC BLOOD PRESSURE: 130 MMHG

## 2024-03-26 DIAGNOSIS — M81.0 AGE-RELATED OSTEOPOROSIS WITHOUT CURRENT PATHOLOGICAL FRACTURE: Primary | ICD-10-CM

## 2024-03-26 DIAGNOSIS — I10 HYPERTENSION, UNSPECIFIED TYPE: ICD-10-CM

## 2024-03-26 DIAGNOSIS — G56.03 BILATERAL CARPAL TUNNEL SYNDROME: ICD-10-CM

## 2024-03-26 DIAGNOSIS — M47.816 LUMBAR SPONDYLOSIS: ICD-10-CM

## 2024-03-26 PROCEDURE — 99214 OFFICE O/P EST MOD 30 MIN: CPT | Performed by: FAMILY MEDICINE

## 2024-03-26 RX ORDER — AMLODIPINE BESYLATE 5 MG/1
5 TABLET ORAL DAILY
Qty: 90 TABLET | Refills: 0 | Status: SHIPPED | OUTPATIENT
Start: 2024-03-26 | End: 2025-03-21

## 2024-03-26 RX ORDER — ALENDRONATE SODIUM 70 MG/1
70 TABLET ORAL
Qty: 12 TABLET | Refills: 3 | Status: SHIPPED | OUTPATIENT
Start: 2024-03-26

## 2024-03-26 NOTE — PROGRESS NOTES
Subjective:   Catia Malik is a 78 year old female who presents for Medication Follow-Up (Bone density medication)       History/Other:    Chief Complaint Reviewed and Verified  Nursing Notes Reviewed and   Verified  Tobacco Reviewed  Allergies Reviewed  Medications Reviewed    Problem List Reviewed  Medical History Reviewed  Surgical History   Reviewed  OB Status Reviewed  Family History Reviewed  Social History   Reviewed         Tobacco:  She smoked tobacco in the past but quit greater than 12 months ago.  Social History    Tobacco Use      Smoking status: Former        Types: Cigarettes        Quit date: 1985        Years since quittin.8      Smokeless tobacco: Never       Current Outpatient Medications   Medication Sig Dispense Refill    alendronate 70 MG Oral Tab Take 1 tablet (70 mg total) by mouth every 7 days. 12 tablet 3    amLODIPine 5 MG Oral Tab Take 1 tablet (5 mg total) by mouth daily. 90 tablet 0    aspirin 81 MG Oral Tab Take 1 tablet (81 mg total) by mouth daily.           Review of Systems:  Review of Systems   Constitutional: Negative.    Gastrointestinal: Negative.    Musculoskeletal:  Positive for back pain.         Objective:   /65   Pulse 77   Temp 98.1 °F (36.7 °C) (Temporal)   Ht 5' (1.524 m)   Wt 109 lb 6.4 oz (49.6 kg)   SpO2 98%   BMI 21.37 kg/m²  Estimated body mass index is 21.37 kg/m² as calculated from the following:    Height as of this encounter: 5' (1.524 m).    Weight as of this encounter: 109 lb 6.4 oz (49.6 kg).  Physical Exam  Vitals reviewed.   Musculoskeletal:      Thoracic back: Normal.      Lumbar back: Normal.   Neurological:      Motor: Motor function is intact.      Comments: Carpal tunnel phalens positive           Assessment & Plan:   1. Age-related osteoporosis without current pathological fracture (Primary)  2. Lumbar spondylosis  3. Bilateral carpal tunnel syndrome  4. Hypertension, unspecified type  -     amLODIPine Besylate;  Take 1 tablet (5 mg total) by mouth daily.  Dispense: 90 tablet; Refill: 0  Other orders  -     Alendronate Sodium; Take 1 tablet (70 mg total) by mouth every 7 days.  Dispense: 12 tablet; Refill: 3    Reviewed medication she has been taking for about 1 month now alendronate which seems to be tolerable.  Possibly at first when she was swallowing this pill lobes causing her some esophageal distress that may have given her this back pain but now she is not experiencing this.  Be sure to take with plenty of water and remain upright for at least 2 hours which she does.  Refilled hypertension medication.  And once again encouraged her to visit with orthopedics or hand surgeon for her carpal tunnel syndrome    No follow-ups on file.    Shailesh Yao DO, 3/26/2024, 5:22 PM

## 2024-04-24 ENCOUNTER — NURSE TRIAGE (OUTPATIENT)
Dept: FAMILY MEDICINE CLINIC | Facility: CLINIC | Age: 79
End: 2024-04-24

## 2024-04-24 NOTE — TELEPHONE ENCOUNTER
Xi Yao. Pt stated that she noticed that her ankles get swollen by the end of the day. The swelling is mild in the morning they are fine.  Pt started to notice this Saturday in the evening. There is no redness or pain. Pt stated that she is taking amlodipine 5 mg. The dose was increased from 2.5 mg to 5 mg in December when she saw a NP (not with us). Pt is not sure why it was increased. Pt stated that she googled and amlodipine does have a side effect of ankle swelling. Pt will like to know if the dose can be decrease to 2.5 mg. Please advise. Pt then mentioned that on Sunday she started to also notice that when she walks her dog she feels some shortness of breath but she is not sure if its related to the weather and her sinuses. Pt denied any shortness of breath when sitting. Below are b/p pt has taken at home. Please advise     4/14  117/67  4/22   127/76      Reason for Disposition   MILD to MODERATE ankle swelling (e.g., can't move joint normally, can't do usual activities) (Exceptions: Itchy, localized swelling; swelling is chronic.)    Protocols used: Ankle Swelling-A-OH

## 2024-04-25 NOTE — TELEPHONE ENCOUNTER
May be related to amlodipine but conintu efor now because BP well controlled. If decrease dose it may be a problem without another medication.  She should have low sodium diet. Follow up before end of May in office.

## 2024-04-26 NOTE — TELEPHONE ENCOUNTER
Left message to call back and transfer to triage with any questions about MyChart message.  [employer] message sent with provider's recommendations and education obtained from [employer].

## 2024-05-21 ENCOUNTER — OFFICE VISIT (OUTPATIENT)
Dept: ORTHOPEDICS CLINIC | Facility: CLINIC | Age: 79
End: 2024-05-21

## 2024-05-21 DIAGNOSIS — G56.01 RIGHT CARPAL TUNNEL SYNDROME: Primary | ICD-10-CM

## 2024-05-21 DIAGNOSIS — G56.02 LEFT CARPAL TUNNEL SYNDROME: ICD-10-CM

## 2024-05-21 PROCEDURE — 99204 OFFICE O/P NEW MOD 45 MIN: CPT | Performed by: ORTHOPAEDIC SURGERY

## 2024-05-21 NOTE — H&P
NURSING INTAKE COMMENTS:   Chief Complaint   Patient presents with    Hand Pain     Consult - B/l hand - Pt states she has carpel tunnel in hands. Pain is on and off. Tingling and  numbness in first 3 fingers.        HPI: This 78 year old right-hand-dominant female presents today for right greater than left carpal tunnel syndrome diagnosed for 2 to 3 years.  She had EMGs which confirmed carpal tunnel syndrome by Dr. Benito about 2 to 3 years ago.  She tried nighttime bracing and it did not help.  She has tried a lot of laser treatment from her chiropractor which helped a little but not completely.  She denies dropping things.    Socially she lives by herself in a townhouse with stairs.  She likes to spend time with her dog and exercises 3-4 times a week.  She is active independent and in good shape physically.  She denies diabetes and smoking.    Past Medical History:    Allergic rhinitis    Bronchitis    Cough due to ACE inhibitor    HTN (hypertension)    Hyperlipidemia    Insomnia    Osteopenia    Seasonal allergies    Sinusitis    Vitamin D deficiency     History reviewed. No pertinent surgical history.  Current Outpatient Medications   Medication Sig Dispense Refill    alendronate 70 MG Oral Tab Take 1 tablet (70 mg total) by mouth every 7 days. 12 tablet 3    amLODIPine 5 MG Oral Tab Take 1 tablet (5 mg total) by mouth daily. 90 tablet 0    aspirin 81 MG Oral Tab Take 1 tablet (81 mg total) by mouth daily.       Allergies   Allergen Reactions    Milk-Related Compounds DIARRHEA     Can not tolerated Protein supplements with milk/whey     Family History   Problem Relation Age of Onset    Heart Attack Father     Diabetes Father     Stroke Mother 83    Arthritis Mother     High Blood Pressure Mother     Musculo-skelatal Disorder Mother         osteoporosis    Cancer Brother      No family Hx of DVT/PE    Social History     Occupational History    Not on file   Tobacco Use    Smoking status: Former     Current  packs/day: 0.00     Types: Cigarettes     Quit date: 1985     Years since quittin.9    Smokeless tobacco: Never   Vaping Use    Vaping status: Never Used   Substance and Sexual Activity    Alcohol use: Yes     Alcohol/week: 0.0 standard drinks of alcohol     Comment: occ    Drug use: No    Sexual activity: Not Currently        Review of Systems:  GENERAL: feels generally well, no significant weight loss or weight gain  SKIN: no ulcerated or worrisome skin lesions  EYES:denies blurred vision or double vision  HEENT: denies new nasal congestion, sinus pain or ST  LUNGS: denies shortness of breath  CARDIOVASCULAR: denies chest pain  GI: no hematemesis, no worsening heartburn, no diarrhea  : no dysuria, no blood in urine, no difficulty urinating, no incontinence  MUSCULOSKELETAL: no other musculoskeletal complaints other than in HPI  NEURO: no numbness or tingling, no weakness or balance disorder  PSYCHE: no depression or anxiety  HEMATOLOGIC: no hx of blood dyscrasia, no Hx DVT/PE  ENDOCRINE: no thyroid or diabetes issues  ALL/ASTHMA: no new hx of severe allergy or asthma    Physical Examination:    There were no vitals taken for this visit.  Constitutional: appears well hydrated, alert and responsive, no acute distress noted  Extremities: The hands show no thenar or hypothenar atrophy symmetrically.  Vascular function is normal.  No swelling.  Musculoskeletal: Full range of motion hand and fingers.  Normal strength.  Neurological: Normal motor and sensory function currently bilateral hands.  Her dysesthesias.  In the median nerve distribution.  Tinel's sign to both wrists was negative.  She had positive median nerve compression test at 10 seconds on the right and 15 seconds on the left.  No signs of cervical radiculopathy.  Normal pinch and .    Imaging: No hand x-rays available.      No results found.     Lab Results   Component Value Date    WBC 16.0 (H) 2023    HGB 12.4 2023    .0  11/17/2023      Lab Results   Component Value Date     (H) 11/17/2023    BUN 26 (H) 11/17/2023    CREATSERUM 0.57 11/17/2023    GFR >60 06/03/2016    GFRNAA 86 08/19/2020    GFRAA 100 08/19/2020        Assessment and Plan:  Diagnoses and all orders for this visit:    Right carpal tunnel syndrome    Left carpal tunnel syndrome        Assessment: Right greater than left carpal tunnel syndrome.  Failed nighttime bracing and extensive red laser treatment by her chiropractor.    Plan: She asked about cortisone injection but I told her that in my opinion, this is not an efficacious treatment and can often aggravate the nerve just as frequently as it helps.  In the end, I recommended surgical releases under local anesthetic.  We discussed the typical experience as well as the rehab.  I told her if she wanted sedation however, we would treat that as a more full surgery.  For now she says she will think about having the carpal tunnel releases under local anesthetic.  I will see her as needed.    Follow Up: No follow-ups on file.    Vikram Young MD

## 2024-05-31 ENCOUNTER — OFFICE VISIT (OUTPATIENT)
Dept: FAMILY MEDICINE CLINIC | Facility: CLINIC | Age: 79
End: 2024-05-31

## 2024-05-31 VITALS
DIASTOLIC BLOOD PRESSURE: 67 MMHG | HEIGHT: 60 IN | RESPIRATION RATE: 18 BRPM | WEIGHT: 112.38 LBS | HEART RATE: 82 BPM | BODY MASS INDEX: 22.06 KG/M2 | SYSTOLIC BLOOD PRESSURE: 158 MMHG | OXYGEN SATURATION: 96 %

## 2024-05-31 DIAGNOSIS — M47.816 LUMBAR SPONDYLOSIS: ICD-10-CM

## 2024-05-31 DIAGNOSIS — I10 HYPERTENSION, UNSPECIFIED TYPE: Primary | ICD-10-CM

## 2024-05-31 PROBLEM — R29.898 OTHER SYMPTOMS AND SIGNS INVOLVING THE MUSCULOSKELETAL SYSTEM: Status: RESOLVED | Noted: 2023-11-17 | Resolved: 2024-05-31

## 2024-05-31 PROBLEM — R20.2 NUMBNESS AND TINGLING: Status: RESOLVED | Noted: 2019-11-30 | Resolved: 2024-05-31

## 2024-05-31 PROBLEM — R20.0 NUMBNESS AND TINGLING: Status: RESOLVED | Noted: 2019-11-30 | Resolved: 2024-05-31

## 2024-05-31 PROCEDURE — 99214 OFFICE O/P EST MOD 30 MIN: CPT | Performed by: FAMILY MEDICINE

## 2024-05-31 RX ORDER — TELMISARTAN 40 MG/1
40 TABLET ORAL DAILY
Qty: 30 TABLET | Refills: 0 | Status: SHIPPED | OUTPATIENT
Start: 2024-05-31

## 2024-05-31 NOTE — PROGRESS NOTES
Subjective:   Catia Malik is a 78 year old female who presents for Follow - Up (Meds f/u) elevated BP.  Taking lower dose of amlodipine and still swelling.        History/Other:    Chief Complaint Reviewed and Verified  Nursing Notes Reviewed and   Verified  Tobacco Reviewed  Allergies Reviewed  Medications Reviewed    Medical History Reviewed  Surgical History Reviewed  OB Status Reviewed    Family History Reviewed  Social History Reviewed         Tobacco:  She smoked tobacco in the past but quit greater than 12 months ago.  Social History     Tobacco Use   Smoking Status Former    Current packs/day: 0.00    Types: Cigarettes    Quit date: 1985    Years since quittin.0   Smokeless Tobacco Never        Current Outpatient Medications   Medication Sig Dispense Refill    telmisartan 40 MG Oral Tab Take 1 tablet (40 mg total) by mouth daily. 30 tablet 0    alendronate 70 MG Oral Tab Take 1 tablet (70 mg total) by mouth every 7 days. 12 tablet 3    aspirin 81 MG Oral Tab Take 1 tablet (81 mg total) by mouth daily.           Review of Systems:  Review of Systems   Constitutional: Negative.    Respiratory: Negative.     Cardiovascular: Negative.    Musculoskeletal:  Positive for back pain.   Neurological:  Negative for weakness and numbness.         Objective:   /67   Pulse 82   Resp 18   Ht 5' (1.524 m)   Wt 112 lb 6 oz (51 kg)   SpO2 96%   BMI 21.95 kg/m²  Estimated body mass index is 21.95 kg/m² as calculated from the following:    Height as of this encounter: 5' (1.524 m).    Weight as of this encounter: 112 lb 6 oz (51 kg).  Physical Exam  Vitals reviewed.   Constitutional:       Appearance: Normal appearance.   Musculoskeletal:      Lumbar back: Tenderness present. Decreased range of motion. Negative right straight leg raise test and negative left straight leg raise test.      Comments: Elevated pelvis left side   Neurological:      Mental Status: She is alert.      Sensory:  Sensation is intact.      Motor: Motor function is intact.           Assessment & Plan:   1. Hypertension, unspecified type (Primary)  2. Lumbar spondylosis  Other orders  -     Telmisartan; Take 1 tablet (40 mg total) by mouth daily.  Dispense: 30 tablet; Refill: 0    BP med change.  See back in 2 weeks. She can consider OMT or PT for her back mechanics.      No follow-ups on file.    Shailesh Yao DO, 5/31/2024, 11:38 AM

## 2024-06-17 ENCOUNTER — OFFICE VISIT (OUTPATIENT)
Dept: FAMILY MEDICINE CLINIC | Facility: CLINIC | Age: 79
End: 2024-06-17
Payer: MEDICARE

## 2024-06-17 VITALS
WEIGHT: 112 LBS | BODY MASS INDEX: 21.99 KG/M2 | HEIGHT: 60 IN | OXYGEN SATURATION: 97 % | DIASTOLIC BLOOD PRESSURE: 76 MMHG | SYSTOLIC BLOOD PRESSURE: 160 MMHG | HEART RATE: 84 BPM

## 2024-06-17 DIAGNOSIS — M47.816 LUMBAR SPONDYLOSIS: ICD-10-CM

## 2024-06-17 DIAGNOSIS — I10 HYPERTENSION, UNSPECIFIED TYPE: Primary | ICD-10-CM

## 2024-06-17 DIAGNOSIS — M99.03 LUMBAR REGION SOMATIC DYSFUNCTION: ICD-10-CM

## 2024-06-17 PROCEDURE — 98926 OSTEOPATH MANJ 3-4 REGIONS: CPT | Performed by: FAMILY MEDICINE

## 2024-06-17 PROCEDURE — 99214 OFFICE O/P EST MOD 30 MIN: CPT | Performed by: FAMILY MEDICINE

## 2024-06-17 RX ORDER — HYDROCHLOROTHIAZIDE 12.5 MG/1
12.5 TABLET ORAL DAILY
Qty: 30 TABLET | Refills: 1 | Status: SHIPPED | OUTPATIENT
Start: 2024-06-17 | End: 2025-06-12

## 2024-06-17 RX ORDER — TELMISARTAN 40 MG/1
40 TABLET ORAL DAILY
Qty: 30 TABLET | Refills: 1 | Status: SHIPPED | OUTPATIENT
Start: 2024-06-17

## 2024-06-17 NOTE — PROGRESS NOTES
Subjective:   Catia Malik is a 78 year old female who presents for Medication Follow-Up   Has been taking new BP med to see if her ankle swelling improves off of the amlodipine.  She states a slight improvement, no worse and better in the mornings.     History/Other:    Chief Complaint Reviewed and Verified  No Further Nursing Notes to   Review  Tobacco Reviewed  Allergies Reviewed  Medications Reviewed    Problem List Reviewed  Medical History Reviewed  Surgical History   Reviewed  Family History Reviewed  Social History Reviewed         Tobacco:  She smoked tobacco in the past but quit greater than 12 months ago.  Social History     Tobacco Use   Smoking Status Former    Current packs/day: 0.00    Types: Cigarettes    Quit date: 1985    Years since quittin.0   Smokeless Tobacco Never        Current Outpatient Medications   Medication Sig Dispense Refill    telmisartan 40 MG Oral Tab Take 1 tablet (40 mg total) by mouth daily. 30 tablet 1    hydroCHLOROthiazide 12.5 MG Oral Tab Take 1 tablet (12.5 mg total) by mouth daily. 30 tablet 1    alendronate 70 MG Oral Tab Take 1 tablet (70 mg total) by mouth every 7 days. 12 tablet 3    aspirin 81 MG Oral Tab Take 1 tablet (81 mg total) by mouth daily.           Review of Systems:  Review of Systems   Constitutional: Negative.    Respiratory: Negative.     Cardiovascular:  Positive for leg swelling. Negative for chest pain and palpitations.   Neurological: Negative.          Objective:   /76   Pulse 84   Ht 5' (1.524 m)   Wt 112 lb (50.8 kg)   SpO2 97%   BMI 21.87 kg/m²  Estimated body mass index is 21.87 kg/m² as calculated from the following:    Height as of this encounter: 5' (1.524 m).    Weight as of this encounter: 112 lb (50.8 kg).  Physical Exam  Vitals reviewed.   Constitutional:       Appearance: Normal appearance.   Cardiovascular:      Rate and Rhythm: Normal rate and regular rhythm.      Comments: Increase varicosities  bilateral lower extremities.  Musculoskeletal:      Right lower leg: No edema.      Left lower leg: No edema.      Right ankle: No swelling.      Left ankle: No swelling.   Neurological:      Mental Status: She is alert.     OMT performed using myofacial release and muscle energy to the psoas, lumbar and sacral areas.  Tolerated well.        Assessment & Plan:   1. Hypertension, unspecified type (Primary)  2. Lumbar spondylosis  3. Lumbar region somatic dysfunction  -     Cancel: OSTEOPATHIC MANIP,1-2 BODY REGN  -     OSTEOPATHIC MANIP,3-4 BODY REGN  Other orders  -     Telmisartan; Take 1 tablet (40 mg total) by mouth daily.  Dispense: 30 tablet; Refill: 1  -     hydroCHLOROthiazide; Take 1 tablet (12.5 mg total) by mouth daily.  Dispense: 30 tablet; Refill: 1    Encouraged her to start taking her BP at home.  Follow up in two weeks for BP since starting the diuretic.  See back for her spine pain.       Return in about 1 month (around 7/17/2024).    Shailesh Yao DO, 6/17/2024, 11:34 AM

## 2024-06-17 NOTE — PROCEDURES
OMT performed using myofacial release and muscle energy to the psoas, lumbar and sacral areas.  Tolerated well.

## 2024-06-24 ENCOUNTER — OFFICE VISIT (OUTPATIENT)
Dept: FAMILY MEDICINE CLINIC | Facility: CLINIC | Age: 79
End: 2024-06-24

## 2024-06-24 VITALS
HEART RATE: 97 BPM | SYSTOLIC BLOOD PRESSURE: 160 MMHG | HEIGHT: 60 IN | DIASTOLIC BLOOD PRESSURE: 82 MMHG | BODY MASS INDEX: 21.99 KG/M2 | WEIGHT: 112 LBS | OXYGEN SATURATION: 96 %

## 2024-06-24 DIAGNOSIS — I10 HYPERTENSION, UNSPECIFIED TYPE: ICD-10-CM

## 2024-06-24 DIAGNOSIS — M99.03 LUMBAR REGION SOMATIC DYSFUNCTION: Primary | ICD-10-CM

## 2024-06-25 NOTE — PROGRESS NOTES
Subjective:   Catia Malik is a 78 year old female who presents for Medication Question (Questions about hydrochlorothiazide ) and Back Pain (Low back )       History/Other:    Chief Complaint Reviewed and Verified  Nursing Notes Reviewed and   Verified  Tobacco Reviewed  Allergies Reviewed  Medications Reviewed    Problem List Reviewed  Medical History Reviewed  Surgical History   Reviewed  Family History Reviewed  Social History Reviewed         Tobacco:  She smoked tobacco in the past but quit greater than 12 months ago.  Social History     Tobacco Use   Smoking Status Former    Current packs/day: 0.00    Types: Cigarettes    Quit date: 1985    Years since quittin.0   Smokeless Tobacco Never        Current Outpatient Medications   Medication Sig Dispense Refill    telmisartan 40 MG Oral Tab Take 1 tablet (40 mg total) by mouth daily. 30 tablet 1    alendronate 70 MG Oral Tab Take 1 tablet (70 mg total) by mouth every 7 days. 12 tablet 3    aspirin 81 MG Oral Tab Take 1 tablet (81 mg total) by mouth daily.           Review of Systems:  Review of Systems   Constitutional: Negative.    Respiratory: Negative.     Cardiovascular:  Positive for leg swelling. Negative for chest pain.   Musculoskeletal:  Positive for back pain.   Neurological:  Negative for weakness and numbness.         Objective:   /82   Pulse 97   Ht 5' (1.524 m)   Wt 112 lb (50.8 kg)   SpO2 96%   BMI 21.87 kg/m²  Estimated body mass index is 21.87 kg/m² as calculated from the following:    Height as of this encounter: 5' (1.524 m).    Weight as of this encounter: 112 lb (50.8 kg).  Physical Exam  Vitals reviewed.   Cardiovascular:      Rate and Rhythm: Normal rate and regular rhythm.   Musculoskeletal:      Lumbar back: Tenderness present.      Right lower leg: No edema.      Left lower leg: No edema.      Comments: Somatic dysfunction lumbar and pelvis left     Sacral and psoas and lumbar somatic dysfunction .      Treated with myofacial release and muscle energy.    Assessment & Plan:   1. Lumbar region somatic dysfunction (Primary)  -     OSTEOPATHIC MANIP,1-2 BODY REGN  2. Hypertension, unspecified type    Follow up eval and treatment for back and pelvic dysfunction.  No edema on exam.  She explains end of day edema and blames medication.  I feel likely due to venous stasis and less likely the medication.  She has been on her fourth BP med now due to side effects. I recommend she consider seeing cardiologist to help to select the next BP med.  Otherwise continue current med.       No follow-ups on file.    Shailesh Yao DO, 6/24/2024, 7:50 PM

## 2024-07-09 ENCOUNTER — TELEPHONE (OUTPATIENT)
Dept: FAMILY MEDICINE CLINIC | Facility: CLINIC | Age: 79
End: 2024-07-09

## 2024-07-09 ENCOUNTER — OFFICE VISIT (OUTPATIENT)
Dept: FAMILY MEDICINE CLINIC | Facility: CLINIC | Age: 79
End: 2024-07-09

## 2024-07-09 VITALS
BODY MASS INDEX: 21.99 KG/M2 | SYSTOLIC BLOOD PRESSURE: 153 MMHG | HEART RATE: 83 BPM | OXYGEN SATURATION: 95 % | HEIGHT: 60 IN | DIASTOLIC BLOOD PRESSURE: 73 MMHG | WEIGHT: 112 LBS

## 2024-07-09 DIAGNOSIS — M99.03 LUMBAR REGION SOMATIC DYSFUNCTION: Primary | ICD-10-CM

## 2024-07-09 DIAGNOSIS — M99.04 SACRAL REGION SOMATIC DYSFUNCTION: ICD-10-CM

## 2024-07-09 DIAGNOSIS — M47.816 LUMBAR SPONDYLOSIS: ICD-10-CM

## 2024-07-09 PROCEDURE — 99214 OFFICE O/P EST MOD 30 MIN: CPT | Performed by: FAMILY MEDICINE

## 2024-07-09 PROCEDURE — 98925 OSTEOPATH MANJ 1-2 REGIONS: CPT | Performed by: FAMILY MEDICINE

## 2024-07-09 RX ORDER — HYDROCHLOROTHIAZIDE 12.5 MG/1
12.5 TABLET ORAL DAILY
Qty: 90 TABLET | Refills: 1 | Status: SHIPPED | OUTPATIENT
Start: 2024-07-09 | End: 2025-07-04

## 2024-07-09 NOTE — PROCEDURES
OMT for the elevated pelvis left, sacral somatic and psoas dysfunction.  Used myofacial release and muscle energy

## 2024-07-09 NOTE — PROGRESS NOTES
Subjective:   Catia Malik is a 78 year old female who presents for Procedure (Lumbar region somatic dysfunction and manipulation procedure)       History/Other:    Chief Complaint Reviewed and Verified  Nursing Notes Reviewed and   Verified  Tobacco Reviewed  Allergies Reviewed  Medications Reviewed    Problem List Reviewed  Medical History Reviewed  Surgical History   Reviewed  Family History Reviewed  Social History Reviewed         Tobacco:  She smoked tobacco in the past but quit greater than 12 months ago.  Social History     Tobacco Use   Smoking Status Former    Current packs/day: 0.00    Types: Cigarettes    Quit date: 1985    Years since quittin.1   Smokeless Tobacco Never        Current Outpatient Medications   Medication Sig Dispense Refill    hydroCHLOROthiazide 12.5 MG Oral Tab Take 1 tablet (12.5 mg total) by mouth daily. 90 tablet 1    alendronate 70 MG Oral Tab Take 1 tablet (70 mg total) by mouth every 7 days. 12 tablet 3    aspirin 81 MG Oral Tab Take 1 tablet (81 mg total) by mouth daily.           Review of Systems:  Review of Systems   Constitutional: Negative.    Respiratory: Negative.     Cardiovascular: Negative.    Musculoskeletal:  Positive for arthralgias and back pain.   Neurological:  Negative for dizziness.         Objective:   /73   Pulse 83   Ht 5' (1.524 m)   Wt 112 lb (50.8 kg)   SpO2 95%   BMI 21.87 kg/m²  Estimated body mass index is 21.87 kg/m² as calculated from the following:    Height as of this encounter: 5' (1.524 m).    Weight as of this encounter: 112 lb (50.8 kg).  Physical Exam  Vitals reviewed.   Musculoskeletal:      Comments: Left elevated pelvis, sacral and psoas somatic dysfunction       OMT for the elevated pelvis left, sacral somatic and psoas dysfunction.  Used myofacial release and muscle energy     Assessment & Plan:   1. Lumbar region somatic dysfunction (Primary)  -     OSTEOPATHIC MANIP,1-2 BODY REGN  2. Lumbar  spondylosis  3. Sacral region somatic dysfunction  Other orders  -     hydroCHLOROthiazide; Take 1 tablet (12.5 mg total) by mouth daily.  Dispense: 90 tablet; Refill: 1    OMT today.  Tolerated procedure. Some improvement.  She is only taking hydrochlorothiazide - quit other med due to side effect.  Home readings better.       No follow-ups on file.    Shailesh Yao DO, 7/9/2024, 11:55 AM

## 2024-07-09 NOTE — TELEPHONE ENCOUNTER
Patient states that she was seen in the office today for a manipulation. Per the patient the doctor would like to see her back in the office in 2 weeks. The first available appointment with Dr. Yao is not until 8-8-24. Would the doctor like to add the patient to the schedule? If so, which date and time.

## 2024-07-10 NOTE — TELEPHONE ENCOUNTER
Spoke, with the patient and informed her of the message below. Patient did schedule an appointment to see Dr. Yao on 7-23-24 a res 24 was used. This was approved per the message below.

## 2024-07-23 ENCOUNTER — OFFICE VISIT (OUTPATIENT)
Dept: FAMILY MEDICINE CLINIC | Facility: CLINIC | Age: 79
End: 2024-07-23
Payer: MEDICARE

## 2024-07-23 VITALS
OXYGEN SATURATION: 96 % | DIASTOLIC BLOOD PRESSURE: 72 MMHG | BODY MASS INDEX: 21.99 KG/M2 | SYSTOLIC BLOOD PRESSURE: 150 MMHG | HEIGHT: 60 IN | WEIGHT: 112 LBS | HEART RATE: 84 BPM

## 2024-07-23 DIAGNOSIS — M99.04 SACRAL REGION SOMATIC DYSFUNCTION: Primary | ICD-10-CM

## 2024-07-23 DIAGNOSIS — I10 HYPERTENSION, UNSPECIFIED TYPE: ICD-10-CM

## 2024-07-23 DIAGNOSIS — M99.03 LUMBAR REGION SOMATIC DYSFUNCTION: ICD-10-CM

## 2024-07-23 PROCEDURE — 98926 OSTEOPATH MANJ 3-4 REGIONS: CPT | Performed by: FAMILY MEDICINE

## 2024-07-23 PROCEDURE — 99213 OFFICE O/P EST LOW 20 MIN: CPT | Performed by: FAMILY MEDICINE

## 2024-07-23 NOTE — PROGRESS NOTES
Subjective:   Catia Malik is a 78 year old female who presents for Pain (Patient here for manipulation procedure due to unbalance when walking )       History/Other:    Chief Complaint Reviewed and Verified  Nursing Notes Reviewed and   Verified  Tobacco Reviewed  Allergies Reviewed  Medications Reviewed    Medical History Reviewed  Surgical History Reviewed  OB Status Reviewed    Family History Reviewed  Social History Reviewed         Tobacco:  She smoked tobacco in the past but quit greater than 12 months ago.  Social History     Tobacco Use   Smoking Status Former    Current packs/day: 0.00    Types: Cigarettes    Quit date: 1985    Years since quittin.1   Smokeless Tobacco Never        Current Outpatient Medications   Medication Sig Dispense Refill    hydroCHLOROthiazide 12.5 MG Oral Tab Take 1 tablet (12.5 mg total) by mouth daily. 90 tablet 1    alendronate 70 MG Oral Tab Take 1 tablet (70 mg total) by mouth every 7 days. 12 tablet 3    aspirin 81 MG Oral Tab Take 1 tablet (81 mg total) by mouth daily.           Review of Systems:  Review of Systems   Constitutional: Negative.    Musculoskeletal:  Positive for back pain.        Left sacrum, pelvis and lumbar tender         Objective:   /72   Pulse 84   Ht 5' (1.524 m)   Wt 112 lb (50.8 kg)   SpO2 96%   BMI 21.87 kg/m²  Estimated body mass index is 21.87 kg/m² as calculated from the following:    Height as of this encounter: 5' (1.524 m).    Weight as of this encounter: 112 lb (50.8 kg).  Physical Exam  Vitals reviewed.   Musculoskeletal:      Lumbar back: Tenderness present. Decreased range of motion. Negative right straight leg raise test and negative left straight leg raise test.      Comments: Elevated left pelvis and tender sacrum with somatic dysfunction     Left sacrum, lumbar, psoas and pelvis OMT using muscle energy and myofacial release.  Tolerated well.        Assessment & Plan:   1. Sacral region somatic  dysfunction (Primary)  2. Lumbar region somatic dysfunction    Impoving.  Home BP readings are excellent.  CPM.        No follow-ups on file.    Shailesh Yao DO, 7/23/2024, 11:53 AM

## 2024-07-23 NOTE — PROCEDURES
Left sacrum, lumbar, psoas and pelvis OMT using muscle energy and myofacial release.  Tolerated well.

## 2024-07-24 ENCOUNTER — TELEPHONE (OUTPATIENT)
Dept: FAMILY MEDICINE CLINIC | Facility: CLINIC | Age: 79
End: 2024-07-24

## 2024-07-24 NOTE — TELEPHONE ENCOUNTER
Patient is requesting a 2 week follow up appointment for adjustment with Dr. Yao.  Patient was seen in the office on 7/23/24 and advised to schedule a 2 week follow up.    Provider's first available appointment is 8/23/24.   Is PCP able to see patient in timeframe requested.

## 2024-08-09 ENCOUNTER — OFFICE VISIT (OUTPATIENT)
Dept: FAMILY MEDICINE CLINIC | Facility: CLINIC | Age: 79
End: 2024-08-09
Payer: MEDICARE

## 2024-08-09 VITALS
HEART RATE: 82 BPM | OXYGEN SATURATION: 94 % | WEIGHT: 108 LBS | SYSTOLIC BLOOD PRESSURE: 123 MMHG | DIASTOLIC BLOOD PRESSURE: 73 MMHG | HEIGHT: 60 IN | BODY MASS INDEX: 21.2 KG/M2

## 2024-08-09 DIAGNOSIS — M99.04 SACRAL REGION SOMATIC DYSFUNCTION: ICD-10-CM

## 2024-08-09 DIAGNOSIS — M99.03 LUMBAR REGION SOMATIC DYSFUNCTION: Primary | ICD-10-CM

## 2024-08-09 NOTE — PROGRESS NOTES
Subjective:   Catia Malik is a 78 year old female who presents for No chief complaint on file.   Feeling back and posture is better  History/Other:    No Further Nursing Notes to Review  Allergies Reviewed  Medications   Reviewed  Problem List Reviewed         Tobacco:  She smoked tobacco in the past but quit greater than 12 months ago.  Social History     Tobacco Use   Smoking Status Former    Current packs/day: 0.00    Types: Cigarettes    Quit date: 1985    Years since quittin.2   Smokeless Tobacco Never        Current Outpatient Medications   Medication Sig Dispense Refill    hydroCHLOROthiazide 12.5 MG Oral Tab Take 1 tablet (12.5 mg total) by mouth daily. 90 tablet 1    alendronate 70 MG Oral Tab Take 1 tablet (70 mg total) by mouth every 7 days. 12 tablet 3    aspirin 81 MG Oral Tab Take 1 tablet (81 mg total) by mouth daily.           Review of Systems:  Review of Systems   Constitutional: Negative.    Musculoskeletal:  Positive for back pain.        Bilateral lumbar.    Neurological:  Negative for weakness and numbness.         Objective:   /73   Pulse 82   Ht 5' (1.524 m)   Wt 108 lb (49 kg)   SpO2 94%   BMI 21.09 kg/m²  Estimated body mass index is 21.09 kg/m² as calculated from the following:    Height as of this encounter: 5' (1.524 m).    Weight as of this encounter: 108 lb (49 kg).  Physical Exam  Vitals reviewed.   Musculoskeletal:      Lumbar back: Tenderness present. No spasms. Decreased range of motion. Negative right straight leg raise test and negative left straight leg raise test.      Comments: Lumbar, sacral and psoas somatic dysfunction       Left sacrum, lumbar, psoas and pelvis OMT using muscle energy and myofacial release.  Tolerated well.      Assessment & Plan:   1. Lumbar region somatic dysfunction (Primary)  2. Sacral region somatic dysfunction    Elevated pelvis left with significant psoas tension.  Improved today with treatment. See back for reevaluation  in one month      No follow-ups on file.    Shailesh Yao DO, 8/9/2024, 4:50 PM

## 2024-08-20 ENCOUNTER — OFFICE VISIT (OUTPATIENT)
Dept: FAMILY MEDICINE CLINIC | Facility: CLINIC | Age: 79
End: 2024-08-20
Payer: MEDICARE

## 2024-08-20 ENCOUNTER — LAB ENCOUNTER (OUTPATIENT)
Dept: LAB | Age: 79
End: 2024-08-20
Attending: FAMILY MEDICINE
Payer: MEDICARE

## 2024-08-20 VITALS
HEART RATE: 90 BPM | HEIGHT: 60 IN | BODY MASS INDEX: 21.45 KG/M2 | DIASTOLIC BLOOD PRESSURE: 84 MMHG | WEIGHT: 109.25 LBS | SYSTOLIC BLOOD PRESSURE: 155 MMHG

## 2024-08-20 DIAGNOSIS — I10 PRIMARY HYPERTENSION: ICD-10-CM

## 2024-08-20 DIAGNOSIS — Z00.00 ENCOUNTER FOR ANNUAL HEALTH EXAMINATION: ICD-10-CM

## 2024-08-20 DIAGNOSIS — H25.13 AGE-RELATED NUCLEAR CATARACT OF BOTH EYES: ICD-10-CM

## 2024-08-20 DIAGNOSIS — M81.0 AGE-RELATED OSTEOPOROSIS WITHOUT CURRENT PATHOLOGICAL FRACTURE: ICD-10-CM

## 2024-08-20 DIAGNOSIS — M48.062 SPINAL STENOSIS OF LUMBAR REGION WITH NEUROGENIC CLAUDICATION: ICD-10-CM

## 2024-08-20 DIAGNOSIS — G56.03 BILATERAL CARPAL TUNNEL SYNDROME: Primary | ICD-10-CM

## 2024-08-20 DIAGNOSIS — M47.816 LUMBAR SPONDYLOSIS: ICD-10-CM

## 2024-08-20 DIAGNOSIS — E78.2 MIXED HYPERLIPIDEMIA: ICD-10-CM

## 2024-08-20 DIAGNOSIS — I67.9 CEREBROVASCULAR DISEASE: ICD-10-CM

## 2024-08-20 PROBLEM — M85.80 OTHER SPECIFIED DISORDERS OF BONE DENSITY AND STRUCTURE, UNSPECIFIED SITE: Status: RESOLVED | Noted: 2023-11-17 | Resolved: 2024-08-20

## 2024-08-20 PROBLEM — J40 BRONCHITIS, NOT SPECIFIED AS ACUTE OR CHRONIC: Status: RESOLVED | Noted: 2023-11-17 | Resolved: 2024-08-20

## 2024-08-20 PROBLEM — M19.90 UNSPECIFIED OSTEOARTHRITIS, UNSPECIFIED SITE: Status: RESOLVED | Noted: 2023-11-17 | Resolved: 2024-08-20

## 2024-08-20 PROBLEM — H40.013 OPEN ANGLE WITH BORDERLINE FINDINGS, LOW RISK, BILATERAL: Status: RESOLVED | Noted: 2017-09-29 | Resolved: 2024-08-20

## 2024-08-20 PROBLEM — M54.9 BACK PAIN WITH RADIATION: Status: RESOLVED | Noted: 2023-11-08 | Resolved: 2024-08-20

## 2024-08-20 PROBLEM — G47.00 INSOMNIA, UNSPECIFIED: Status: RESOLVED | Noted: 2023-11-17 | Resolved: 2024-08-20

## 2024-08-20 PROBLEM — R27.8 OTHER LACK OF COORDINATION: Status: RESOLVED | Noted: 2023-11-17 | Resolved: 2024-08-20

## 2024-08-20 PROBLEM — Z74.09 IMPAIRED MOBILITY AND ADLS: Status: RESOLVED | Noted: 2023-11-14 | Resolved: 2024-08-20

## 2024-08-20 PROBLEM — Z28.311 PARTIALLY VACCINATED FOR COVID-19: Status: RESOLVED | Noted: 2023-11-17 | Resolved: 2024-08-20

## 2024-08-20 PROBLEM — L89.312 PRESSURE ULCER OF RIGHT BUTTOCK, STAGE 2 (HCC): Status: RESOLVED | Noted: 2023-11-17 | Resolved: 2024-08-20

## 2024-08-20 PROBLEM — R42: Status: RESOLVED | Noted: 2023-11-14 | Resolved: 2024-08-20

## 2024-08-20 PROBLEM — L89.46: Status: RESOLVED | Noted: 2023-11-22 | Resolved: 2024-08-20

## 2024-08-20 PROBLEM — M84.48XA SACRAL INSUFFICIENCY FRACTURE: Status: RESOLVED | Noted: 2023-11-17 | Resolved: 2024-08-20

## 2024-08-20 PROBLEM — Z78.9 IMPAIRED MOBILITY AND ADLS: Status: RESOLVED | Noted: 2023-11-14 | Resolved: 2024-08-20

## 2024-08-20 LAB
ALBUMIN SERPL-MCNC: 4.6 G/DL (ref 3.2–4.8)
ALBUMIN/GLOB SERPL: 1.4 {RATIO} (ref 1–2)
ALP LIVER SERPL-CCNC: 79 U/L
ALT SERPL-CCNC: 18 U/L
ANION GAP SERPL CALC-SCNC: 6 MMOL/L (ref 0–18)
AST SERPL-CCNC: 22 U/L (ref ?–34)
BASOPHILS # BLD AUTO: 0.1 X10(3) UL (ref 0–0.2)
BASOPHILS NFR BLD AUTO: 1 %
BILIRUB SERPL-MCNC: 0.9 MG/DL (ref 0.2–1.1)
BUN BLD-MCNC: 11 MG/DL (ref 9–23)
BUN/CREAT SERPL: 15.5 (ref 10–20)
CALCIUM BLD-MCNC: 9.6 MG/DL (ref 8.7–10.4)
CHLORIDE SERPL-SCNC: 105 MMOL/L (ref 98–112)
CHOLEST SERPL-MCNC: 170 MG/DL (ref ?–200)
CO2 SERPL-SCNC: 29 MMOL/L (ref 21–32)
CREAT BLD-MCNC: 0.71 MG/DL
DEPRECATED RDW RBC AUTO: 44.6 FL (ref 35.1–46.3)
EGFRCR SERPLBLD CKD-EPI 2021: 88 ML/MIN/1.73M2 (ref 60–?)
EOSINOPHIL # BLD AUTO: 0.28 X10(3) UL (ref 0–0.7)
EOSINOPHIL NFR BLD AUTO: 2.8 %
ERYTHROCYTE [DISTWIDTH] IN BLOOD BY AUTOMATED COUNT: 12.3 % (ref 11–15)
FASTING PATIENT LIPID ANSWER: YES
FASTING STATUS PATIENT QL REPORTED: YES
GLOBULIN PLAS-MCNC: 3.4 G/DL (ref 2–3.5)
GLUCOSE BLD-MCNC: 87 MG/DL (ref 70–99)
HCT VFR BLD AUTO: 35.8 %
HDLC SERPL-MCNC: 61 MG/DL (ref 40–59)
HGB BLD-MCNC: 12.1 G/DL
IMM GRANULOCYTES # BLD AUTO: 0.03 X10(3) UL (ref 0–1)
IMM GRANULOCYTES NFR BLD: 0.3 %
LDLC SERPL CALC-MCNC: 100 MG/DL (ref ?–100)
LYMPHOCYTES # BLD AUTO: 2.6 X10(3) UL (ref 1–4)
LYMPHOCYTES NFR BLD AUTO: 26.3 %
MCH RBC QN AUTO: 33.5 PG (ref 26–34)
MCHC RBC AUTO-ENTMCNC: 33.8 G/DL (ref 31–37)
MCV RBC AUTO: 99.2 FL
MONOCYTES # BLD AUTO: 0.79 X10(3) UL (ref 0.1–1)
MONOCYTES NFR BLD AUTO: 8 %
NEUTROPHILS # BLD AUTO: 6.07 X10 (3) UL (ref 1.5–7.7)
NEUTROPHILS # BLD AUTO: 6.07 X10(3) UL (ref 1.5–7.7)
NEUTROPHILS NFR BLD AUTO: 61.6 %
NONHDLC SERPL-MCNC: 109 MG/DL (ref ?–130)
OSMOLALITY SERPL CALC.SUM OF ELEC: 289 MOSM/KG (ref 275–295)
PLATELET # BLD AUTO: 307 10(3)UL (ref 150–450)
POTASSIUM SERPL-SCNC: 4.1 MMOL/L (ref 3.5–5.1)
PROT SERPL-MCNC: 8 G/DL (ref 5.7–8.2)
RBC # BLD AUTO: 3.61 X10(6)UL
SODIUM SERPL-SCNC: 140 MMOL/L (ref 136–145)
TRIGL SERPL-MCNC: 40 MG/DL (ref 30–149)
VLDLC SERPL CALC-MCNC: 7 MG/DL (ref 0–30)
WBC # BLD AUTO: 9.9 X10(3) UL (ref 4–11)

## 2024-08-20 PROCEDURE — 99213 OFFICE O/P EST LOW 20 MIN: CPT | Performed by: FAMILY MEDICINE

## 2024-08-20 PROCEDURE — 80061 LIPID PANEL: CPT | Performed by: FAMILY MEDICINE

## 2024-08-20 PROCEDURE — 36415 COLL VENOUS BLD VENIPUNCTURE: CPT | Performed by: FAMILY MEDICINE

## 2024-08-20 PROCEDURE — 85025 COMPLETE CBC W/AUTO DIFF WBC: CPT | Performed by: FAMILY MEDICINE

## 2024-08-20 PROCEDURE — 80053 COMPREHEN METABOLIC PANEL: CPT | Performed by: FAMILY MEDICINE

## 2024-08-20 PROCEDURE — G0439 PPPS, SUBSEQ VISIT: HCPCS | Performed by: FAMILY MEDICINE

## 2024-08-20 RX ORDER — TELMISARTAN 20 MG/1
20 TABLET ORAL DAILY
Qty: 30 TABLET | Refills: 5 | Status: SHIPPED | OUTPATIENT
Start: 2024-08-20

## 2024-08-20 NOTE — PROGRESS NOTES
Subjective:   Catia Malik is a 78 year old female who presents for a Medicare Wellness Visit charge within the last 11 months and Patient may not meet criteria for AWV: Please evaluate for correct coding and scheduled follow up of multiple significant but stable problems.       History/Other:   Fall Risk Assessment:   She has been screened for Falls and is low risk.      Cognitive Assessment:   She had a completely normal cognitive assessment - see flowsheet entries     Functional Ability/Status:   Catia Malik has a completely normal functional assessment. See flowsheet for details.        Depression Screening (PHQ):  PHQ-2 SCORE: 0  , done 8/20/2024             Advanced Directives:   She does have a Living Will but we do NOT have it on file in Epic.    She does have a POA but we do NOT have it on file in Epic.    Discussed Advance Care Planning with patient (and family/surrogate if present). Standard forms made available to patient in After Visit Summary.      Patient Active Problem List   Diagnosis    Cerebrovascular disease    Lumbar spondylosis    Spinal stenosis of lumbar region with neurogenic claudication    Carpal tunnel syndrome    Age-related osteoporosis without current pathological fracture    Hyperlipidemia, unspecified    Unspecified severe protein-calorie malnutrition (HCC)    Age-related nuclear cataract of both eyes     Allergies:  She is allergic to milk-related compounds.    Current Medications:  Outpatient Medications Marked as Taking for the 8/20/24 encounter (Office Visit) with Shailesh Yao, DO   Medication Sig    Telmisartan 20 MG Oral Tab Take 1 tablet (20 mg total) by mouth daily.    alendronate 70 MG Oral Tab Take 1 tablet (70 mg total) by mouth every 7 days.    aspirin 81 MG Oral Tab Take 1 tablet (81 mg total) by mouth daily.       Medical History:  She  has a past medical history of Allergic rhinitis, Bronchitis, Cough due to ACE inhibitor, HTN (hypertension), Hyperlipidemia,  Insomnia, Osteopenia, Seasonal allergies, Sinusitis, and Vitamin D deficiency.  Surgical History:  She  has no past surgical history on file.   Family History:  Her family history includes Arthritis in her mother; Cancer in her brother; Diabetes in her father; Heart Attack in her father; High Blood Pressure in her mother; Musculo-skelatal Disorder in her mother; Stroke (age of onset: 83) in her mother.  Social History:  She  reports that she quit smoking about 39 years ago. Her smoking use included cigarettes. She has never been exposed to tobacco smoke. She has never used smokeless tobacco. She reports current alcohol use. She reports that she does not use drugs.    Tobacco:  She smoked tobacco in the past but quit greater than 12 months ago.  Social History     Tobacco Use   Smoking Status Former    Current packs/day: 0.00    Types: Cigarettes    Quit date: 1985    Years since quittin.2    Passive exposure: Never   Smokeless Tobacco Never          CAGE Alcohol Screen:   CAGE screening score of 0 on 2024, showing low risk of alcohol abuse.      Patient Care Team:  Shailesh Yao DO as PCP - General (Family Medicine)  [facility], aBrtolo Medina MD as Consulting Physician (NEUROLOGY)    Review of Systems   Constitutional: Negative.    HENT: Negative.     Eyes: Negative.    Respiratory: Negative.     Cardiovascular: Negative.    Gastrointestinal: Negative.    Genitourinary: Negative.    Musculoskeletal: Negative.    Skin: Negative.    Neurological:  Positive for numbness.   Psychiatric/Behavioral: Negative.            Objective:   Physical Exam  Vitals reviewed.   Constitutional:       Appearance: Normal appearance. She is well-developed and underweight.   HENT:      Head: Normocephalic.      Right Ear: Hearing, tympanic membrane and ear canal normal.      Left Ear: Hearing, tympanic membrane and ear canal normal.      Nose: Nose normal.   Eyes:      Conjunctiva/sclera: Conjunctivae normal.      Pupils: Pupils  are equal, round, and reactive to light.      Funduscopic exam:     Right eye: No hemorrhage or AV nicking.         Left eye: No hemorrhage or AV nicking.   Neck:      Thyroid: No thyroid mass or thyromegaly.   Cardiovascular:      Heart sounds: Normal heart sounds.   Pulmonary:      Breath sounds: Normal breath sounds.   Abdominal:      Tenderness: There is no abdominal tenderness.   Musculoskeletal:      Cervical back: Normal range of motion and neck supple.      Lumbar back: Normal.   Lymphadenopathy:      Upper Body:      Right upper body: No supraclavicular adenopathy.      Left upper body: No supraclavicular adenopathy.   Skin:     Comments: No suspicious findings waist up exam   Neurological:      Mental Status: She is alert and oriented to person, place, and time.      Sensory: No sensory deficit.      Deep Tendon Reflexes: Reflexes are normal and symmetric.   Psychiatric:         Mood and Affect: Mood is not anxious or depressed.            /84   Pulse 90   Ht 5' (1.524 m)   Wt 109 lb 4 oz (49.6 kg)   BMI 21.34 kg/m²  Estimated body mass index is 21.34 kg/m² as calculated from the following:    Height as of this encounter: 5' (1.524 m).    Weight as of this encounter: 109 lb 4 oz (49.6 kg).    Medicare Hearing Assessment:   Hearing Screening    Time taken: 8/20/2024 11:45 AM  Screening Method: Questionnaire  I have a problem hearing over the telephone: No I have trouble following the conversations when two or more people are talking at the same time: No   I have trouble understanding things on the TV: No I have to strain to understand conversations: No   I have to worry about missing the telephone ring or doorbell: No I have trouble hearing conversations in a noisy background such as a crowded room or restaurant: No   I get confused about where sounds come from: No I misunderstand some words in a sentence and need to ask people to repeat themselves: No   I especially have trouble understanding the  speech of women and children: No I have trouble understanding the speaker in a large room such as at a meeting or place of [place]: No   Many people I talk to seem to mumble (or don't speak clearly): No People get annoyed because I misunderstand what they say: No   I misunderstand what others are saying and make inappropriate responses: No I avoid social activities because I cannot hear well and fear I will reply improperly: No   Family members and friends have told me they think I may have hearing loss: No             Visual Acuity:   Right Eye Visual Acuity: Corrected Right Eye Chart Acuity: 20/40   Left Eye Visual Acuity: Corrected Left Eye Chart Acuity: 20/100   Both Eyes Visual Acuity: Corrected Both Eyes Chart Acuity: 20/40   Able To Tolerate Visual Acuity: Yes        Assessment & Plan:   Catia Malik is a 78 year old female who presents for a Medicare Assessment.     1. Bilateral carpal tunnel syndrome (Primary)  -     Ortho Referral - In Network  She does not wish to have surgical repair.  Referred to hand surgeon for possible steroid injection for carpal tunnel syndrome    2. Mixed hyperlipidemia  -     Lipid Panel  Laboratory testing ordered    3. Primary hypertension  -     CBC With Differential With Platelet  -     Comp Metabolic Panel (14)  Continue present medication    4. Age-related nuclear cataract of both eyes  Sees ophthalmologist yearly stable    5. Age-related osteoporosis without current pathological fracture  Tolerating medication stable    6. Cerebrovascular disease  Overview:  Multiple small chronic lacunar infarcts noted MRI brain 11/04/2016  MRI in the past located small lacunar infarcts otherwise asymptomatic    7. Lumbar spondylosis  Pain stable at this time follow-up as needed    8. Spinal stenosis of lumbar region with neurogenic claudication  Pain control at this time follow-up as needed      10. Encounter for annual health examination  Other orders  -     Telmisartan; Take 1  tablet (20 mg total) by mouth daily.  Dispense: 30 tablet; Refill: 5    The patient indicates understanding of these issues and agrees to the plan.  Consult ordered.  Lab work ordered.  Reinforced healthy diet, lifestyle, and exercise.      No follow-ups on file.     Shailesh Yao DO, 8/20/2024     Supplementary Documentation:   General Health:  In the past six months, have you lost more than 10 pounds without trying?: 2 - No  Has your appetite been poor?: No  Type of Diet: Balanced  How does the patient maintain a good energy level?: Appropriate Exercise;Daily Walks;Stretching  How would you describe your daily physical activity?: Moderate  How would you describe your current health state?: Good  How do you maintain positive mental well-being?: Social Interaction;Visiting Friends  On a scale of 0 to 10, with 0 being no pain and 10 being severe pain, what is your pain level?: 0 - (None)  In the past six months, have you experienced urine leakage?: 0-No  At any time do you feel concerned for the safety/well-being of yourself and/or your children, in your home or elsewhere?: No  Have you had any immunizations at another office such as Influenza, Hepatitis B, Tetanus, or Pneumococcal?: No    Health Maintenance   Topic Date Due    COVID-19 Vaccine (3 - 2023-24 season) 09/01/2023    Annual Physical  08/15/2024    HTN: BP Follow-Up  09/20/2024    Influenza Vaccine (1) 10/01/2024    DEXA Scan  Completed    Annual Depression Screening  Completed    Fall Risk Screening (Annual)  Completed    Pneumococcal Vaccine: 65+ Years  Completed    Zoster Vaccines  Completed    Colorectal Cancer Screening  Discontinued    Mammogram  Discontinued

## 2024-08-23 NOTE — ED QUICK NOTES
Abdomen , soft, nontender, nondistended , no guarding or rigidity , no masses palpable
Orders for admission, patient is aware of plan and ready to go upstairs. Any questions, please call ED RN Priya at extension [phone]. Patient Covid vaccination status: Fully vaccinated     COVID Test Ordered in ED: None    COVID Suspicion at Admission: N/A    Running Infusions:  None    Mental Status/LOC at time of transport:  AxOx4    Other pertinent information:   CIWA score: N/A   NIH score:  N/A
Primary RN out for lunch break. Monitoring and assuming temporary care for patient.
none

## 2024-09-20 ENCOUNTER — OFFICE VISIT (OUTPATIENT)
Dept: FAMILY MEDICINE CLINIC | Facility: CLINIC | Age: 79
End: 2024-09-20

## 2024-09-20 VITALS
SYSTOLIC BLOOD PRESSURE: 163 MMHG | OXYGEN SATURATION: 96 % | WEIGHT: 112 LBS | HEIGHT: 60 IN | HEART RATE: 77 BPM | DIASTOLIC BLOOD PRESSURE: 78 MMHG | BODY MASS INDEX: 21.99 KG/M2

## 2024-09-20 DIAGNOSIS — M47.816 LUMBAR SPONDYLOSIS: Primary | ICD-10-CM

## 2024-09-20 DIAGNOSIS — M99.03 LUMBAR REGION SOMATIC DYSFUNCTION: ICD-10-CM

## 2024-09-20 DIAGNOSIS — M99.05 PELVIC SOMATIC DYSFUNCTION: ICD-10-CM

## 2024-09-20 NOTE — PROGRESS NOTES
Subjective:   Catia Malik is a 78 year old female who presents for Procedure (Manipulation/)       History/Other:    Chief Complaint Reviewed and Verified  Nursing Notes Reviewed and   Verified  Allergies Reviewed  Medications Reviewed  Problem List   Reviewed         Tobacco:  She smoked tobacco in the past but quit greater than 12 months ago.  Social History     Tobacco Use   Smoking Status Former    Current packs/day: 0.00    Types: Cigarettes    Quit date: 1985    Years since quittin.3    Passive exposure: Never   Smokeless Tobacco Never        Current Outpatient Medications   Medication Sig Dispense Refill    Telmisartan 20 MG Oral Tab Take 1 tablet (20 mg total) by mouth daily. 30 tablet 5    alendronate 70 MG Oral Tab Take 1 tablet (70 mg total) by mouth every 7 days. 12 tablet 3    aspirin 81 MG Oral Tab Take 1 tablet (81 mg total) by mouth daily.           Review of Systems:  Review of Systems   Constitutional: Negative.    Musculoskeletal:  Positive for back pain.         Objective:   BP (!) 163/78   Pulse 77   Ht 5' (1.524 m)   Wt 112 lb (50.8 kg)   SpO2 96%   BMI 21.87 kg/m²  Estimated body mass index is 21.87 kg/m² as calculated from the following:    Height as of this encounter: 5' (1.524 m).    Weight as of this encounter: 112 lb (50.8 kg).  Physical Exam  Vitals reviewed.   Musculoskeletal:      Comments: Left elevated pelvis.  Mild lumbar convex curve left.  Lumbar and pelvic somatic dysfunction     Left sacrum, lumbar, psoas and pelvis OMT using muscle energy and myofacial release.  Tolerated well.        Assessment & Plan:   1. Lumbar spondylosis (Primary)  2. Lumbar region somatic dysfunction  -     OSTEOPATHIC MANIP,3-4 BODY REGN  3. Pelvic somatic dysfunction  -     OSTEOPATHIC MANIP,3-4 BODY REGN    Improving and feels well.  Reviewed past dexa.  CPM.        No follow-ups on file.    Shailesh Yao DO, 2024, 5:00 PM

## 2024-10-18 ENCOUNTER — OFFICE VISIT (OUTPATIENT)
Dept: FAMILY MEDICINE CLINIC | Facility: CLINIC | Age: 79
End: 2024-10-18
Payer: MEDICARE

## 2024-10-18 VITALS
WEIGHT: 112 LBS | OXYGEN SATURATION: 96 % | HEIGHT: 60 IN | HEART RATE: 76 BPM | SYSTOLIC BLOOD PRESSURE: 180 MMHG | BODY MASS INDEX: 21.99 KG/M2 | DIASTOLIC BLOOD PRESSURE: 80 MMHG

## 2024-10-18 DIAGNOSIS — M99.03 LUMBAR REGION SOMATIC DYSFUNCTION: ICD-10-CM

## 2024-10-18 DIAGNOSIS — M99.05 PELVIC SOMATIC DYSFUNCTION: ICD-10-CM

## 2024-10-18 DIAGNOSIS — M47.816 LUMBAR SPONDYLOSIS: Primary | ICD-10-CM

## 2024-10-18 NOTE — PROGRESS NOTES
Subjective:   Catia Malik is a 78 year old female who presents for No chief complaint on file.       History/Other:    No Further Nursing Notes to Review  Allergies Reviewed  Problem List   Reviewed  OB Status Reviewed         Tobacco:  She smoked tobacco in the past but quit greater than 12 months ago.  Social History     Tobacco Use   Smoking Status Former    Current packs/day: 0.00    Types: Cigarettes    Quit date: 1985    Years since quittin.4    Passive exposure: Never   Smokeless Tobacco Never        Current Outpatient Medications   Medication Sig Dispense Refill    Telmisartan 20 MG Oral Tab Take 1 tablet (20 mg total) by mouth daily. 30 tablet 5    alendronate 70 MG Oral Tab Take 1 tablet (70 mg total) by mouth every 7 days. 12 tablet 3    aspirin 81 MG Oral Tab Take 1 tablet (81 mg total) by mouth daily.           Review of Systems:  Review of Systems   Constitutional: Negative.    Musculoskeletal:  Positive for back pain.         Objective:   BP (!) 180/80   Pulse 76   Ht 5' (1.524 m)   Wt 112 lb (50.8 kg)   SpO2 96%   BMI 21.87 kg/m²  Estimated body mass index is 21.87 kg/m² as calculated from the following:    Height as of this encounter: 5' (1.524 m).    Weight as of this encounter: 112 lb (50.8 kg).  Physical Exam  Vitals reviewed.   Musculoskeletal:      Lumbar back: Tenderness present. Decreased range of motion. Negative right straight leg raise test and negative left straight leg raise test.      Comments: Lumbar and sacral pelvic somatic dysfunction     Left sacrum, lumbar, psoas and pelvis OMT using muscle energy and myofacial release.  Tolerated well.        Assessment & Plan:   1. Lumbar spondylosis (Primary)  2. Lumbar region somatic dysfunction  3. Pelvic somatic dysfunction    Toelreated treatment well.  Home BP readings are good and we will depend on those for her BP.      No follow-ups on file.    Shailesh Yao DO, 10/18/2024, 1:53 PM

## 2024-11-19 ENCOUNTER — OFFICE VISIT (OUTPATIENT)
Dept: FAMILY MEDICINE CLINIC | Facility: CLINIC | Age: 79
End: 2024-11-19
Payer: MEDICARE

## 2024-11-19 VITALS
DIASTOLIC BLOOD PRESSURE: 79 MMHG | HEIGHT: 60 IN | HEART RATE: 81 BPM | BODY MASS INDEX: 21.6 KG/M2 | SYSTOLIC BLOOD PRESSURE: 164 MMHG | WEIGHT: 110 LBS | OXYGEN SATURATION: 95 %

## 2024-11-19 DIAGNOSIS — M99.05 PELVIC SOMATIC DYSFUNCTION: ICD-10-CM

## 2024-11-19 DIAGNOSIS — M47.816 LUMBAR SPONDYLOSIS: ICD-10-CM

## 2024-11-19 DIAGNOSIS — M99.03 LUMBAR REGION SOMATIC DYSFUNCTION: Primary | ICD-10-CM

## 2024-11-19 NOTE — PROGRESS NOTES
Subjective:   Catia Malik is a 78 year old female who presents for Follow - Up (Lumbar spondylosis)       History/Other:    Chief Complaint Reviewed and Verified  Nursing Notes Reviewed and   Verified  Tobacco Reviewed  Allergies Reviewed  Medications Reviewed    Medical History Reviewed  Surgical History Reviewed  Family History   Reviewed  Social History Reviewed         Tobacco:  She smoked tobacco in the past but quit greater than 12 months ago.  Social History     Tobacco Use   Smoking Status Former    Current packs/day: 0.00    Types: Cigarettes    Quit date: 1985    Years since quittin.4    Passive exposure: Never   Smokeless Tobacco Never        Current Outpatient Medications   Medication Sig Dispense Refill    Telmisartan 20 MG Oral Tab Take 1 tablet (20 mg total) by mouth daily. 30 tablet 5    alendronate 70 MG Oral Tab Take 1 tablet (70 mg total) by mouth every 7 days. 12 tablet 3    aspirin 81 MG Oral Tab Take 1 tablet (81 mg total) by mouth daily.           Review of Systems:  Review of Systems   Constitutional: Negative.    Musculoskeletal:  Positive for arthralgias and back pain.         Objective:   BP (!) 164/79   Pulse 81   Ht 5' (1.524 m)   Wt 110 lb (49.9 kg)   SpO2 95%   BMI 21.48 kg/m²  Estimated body mass index is 21.48 kg/m² as calculated from the following:    Height as of this encounter: 5' (1.524 m).    Weight as of this encounter: 110 lb (49.9 kg).  Physical Exam  Musculoskeletal:      Lumbar back: Tenderness present. Decreased range of motion. Negative right straight leg raise test and negative left straight leg raise test.      Comments: Left sacral , psoas and lumbar somatic dysfunction     Left sacrum, lumbar, psoas and pelvis OMT using muscle energy and myofacial release.  Tolerated well.        Assessment & Plan:   1. Lumbar region somatic dysfunction (Primary)  -     OSTEOPATHIC MANIP,3-4 BODY REGN  2. Pelvic somatic dysfunction  -     OSTEOPATHIC  MANIP,3-4 BODY REGN  3. Lumbar spondylosis    Doing well. Home BP readings normal.  CPM.  Having CTS surgical release next month.       No follow-ups on file.    Shailesh Yao DO, 11/19/2024, 12:03 PM

## 2025-02-13 NOTE — TELEPHONE ENCOUNTER
Please Review. Protocol Failed; No Protocol   BP Readings from Last 3 Encounters:   11/19/24 (!) 164/79   10/18/24 (!) 180/80   09/20/24 (!) 163/78       Requested Prescriptions   Pending Prescriptions Disp Refills    TELMISARTAN 20 MG Oral Tab [Pharmacy Med Name: Telmisartan 20 MG Oral Tablet] 30 tablet 0     Sig: Take 1 tablet by mouth once daily       Hypertension Medications Protocol Failed - 2/13/2025 12:14 PM        Failed - Last BP reading less than 140/90     BP Readings from Last 1 Encounters:   11/19/24 (!) 164/79               Passed - CMP or BMP in past 12 months        Passed - In person appointment or virtual visit in the past 12 mos or appointment in next 3 mos     Recent Outpatient Visits              2 months ago Lumbar region somatic dysfunction    [facility], [address], Shailesh Olmos, DO    Office Visit    3 months ago [facility]    [facility]    4 months ago Lumbar spondylosis    [facility] [address]    Office Visit    5 months ago [facility]ilateral carpal tunnel [facility]    [facility]    6 months ago Lumbar region somatic dysfunction    [facility] [address]    Office Visit                      Passed - EGFRCR or GFRNAA > 50     GFR Evaluation  EGFRCR: 88 , resulted on 8/20/2024          Passed - Medication is active on med list                 Recent Outpatient Visits              2 months ago Lumbar region somatic [facility]    [facility]    3 months ago [facility]ond[facility]    [facility]    4 months ago Lumbar  spondylosis    [facility], [address], Shailesh Olmos,     Office Visit    5 months ago Bilateral carpal tunnel syndrome    [facility], [address], Shailesh Olmos,     Office Visit    6 months ago Lumbar region somatic dysfunction    [facility], [address], Shailesh Olmos,     Office Visit

## 2025-02-14 RX ORDER — TELMISARTAN 20 MG/1
20 TABLET ORAL DAILY
Qty: 90 TABLET | Refills: 1 | Status: SHIPPED | OUTPATIENT
Start: 2025-02-14

## 2025-03-19 RX ORDER — ALENDRONATE SODIUM 70 MG/1
70 TABLET ORAL
Qty: 12 TABLET | Refills: 3 | Status: SHIPPED | OUTPATIENT
Start: 2025-03-19

## 2025-03-19 NOTE — TELEPHONE ENCOUNTER
Refill passed per Clinic protocol.  Requested Prescriptions   Pending Prescriptions Disp Refills    alendronate 70 MG Oral Tab [Pharmacy Med Name: Alendronate Sodium 70 MG Oral Tablet] 12 tablet 0     Sig: Take 1 tablet (70 mg total) by mouth every 7 days.       Osteoporosis Medication Protocol Passed - 3/19/2025  4:18 PM        Passed - In person appointment or virtual visit in the past 6 mos or appointment in next 3 mos     Recent Outpatient Visits              4 months ago Lumbar region somatic dysfunction    [facility], [address],     Office Visit    5 months [facility]    [facility]    6 months ago Lumbar spondylosis    [facility], [address],     Office Visit    7 months ago Bilateral carpal tunnel syndrome    [facility], [address]    Office Visit    7 months ago Lumbar region somatic dysfunction    [facility], [address] [facility]    Office Visit                      Passed - DEXA scan within past 2 years        Passed - CMP within the past 12 months        Passed - Calcium level between 8.3 and 10.3     Lab Results   Component Value Date    CA 9.6 08/20/2024               Passed - GFR level greater than 35     GFR Evaluation  EGFRCR: 88 , resulted on 8/20/2024          Passed - Medication is active on med list

## 2025-05-20 ENCOUNTER — TELEPHONE (OUTPATIENT)
Dept: FAMILY MEDICINE CLINIC | Facility: CLINIC | Age: 80
End: 2025-05-20

## 2025-05-25 ENCOUNTER — HOSPITAL ENCOUNTER (OUTPATIENT)
Age: 80
Discharge: HOME | End: 2025-05-25
Attending: EMERGENCY MEDICINE
Payer: MEDICARE

## 2025-05-25 VITALS
TEMPERATURE: 98 F | RESPIRATION RATE: 20 BRPM | DIASTOLIC BLOOD PRESSURE: 70 MMHG | SYSTOLIC BLOOD PRESSURE: 161 MMHG | OXYGEN SATURATION: 100 % | HEART RATE: 77 BPM

## 2025-05-25 DIAGNOSIS — J32.9 RECURRENT SINUSITIS: Primary | ICD-10-CM

## 2025-05-25 PROCEDURE — 99213 OFFICE O/P EST LOW 20 MIN: CPT

## 2025-05-25 NOTE — ED PROVIDER NOTES
Patient Seen in: [facility]        History  Chief Complaint   Patient presents with    Cough/URI     Stated Complaint: sinus    Subjective:   HPI    The patient is a 79-year-old female with past history of hypertension, hyperlipidemia, occasional sinus infections who presents now with sinus congestion and drainage.  The patient states the symptoms have been going on for approximately 2-1/2 weeks.  Patient denies any fever.  The patient states she has a minimal cough.  Patient describes a \"dry throat\" as well.  Patient denies any COVID concerns.  Patient states she has had similar symptoms in the past which have responded well to antibiotics for sinus infection.  Patient states that sinus discharge is primarily clear.  The patient states that she has had occasional sinus infections in the past though it has been a couple years since she has had one.      Objective:     Past Medical History:    Allergic rhinitis    Bronchitis    Cough due to ACE inhibitor    HTN (hypertension)    Hyperlipidemia    Insomnia    Osteopenia    Seasonal allergies    Sinusitis    Vitamin D deficiency              History reviewed. No pertinent surgical history.             Social History     Socioeconomic History    Marital status:    Tobacco Use    Smoking status: Former     Current packs/day: 0.00     Types: Cigarettes     Quit date: 1985     Years since quittin.0     Passive exposure: Never    Smokeless tobacco: Never   Vaping Use    Vaping status: Never Used   Substance and Sexual Activity    Alcohol use: Yes     Alcohol/week: 0.0 standard drinks of alcohol     Comment: occ    Drug use: No    Sexual activity: Not Currently   Other Topics Concern    Caffeine Concern Yes     Comment: rare coffee    Exercise Yes     Comment: yoga,curves,Ambrose    Social History Narrative    The patient does not use an assistive device..      The patient does live in a home with stairs.     Social Drivers of Health     Food  Insecurity: No Food Insecurity (11/14/2023)    Food Insecurity     Food Insecurity: Never true   Transportation Needs: No Transportation Needs (11/14/2023)    Transportation Needs     Lack of Transportation: No   Housing Stability: Low Risk  (11/14/2023)    Housing Stability     Housing Instability: No              Review of Systems    Positive for stated complaint: sinus  Other systems are as noted in HPI.  Constitutional and vital signs reviewed.      All other systems reviewed and negative except as noted above.                  Physical Exam    ED Triage Vitals [05/25/25 1412]   BP (!) 161/70   Pulse 77   Resp 20   Temp 98.4 °F (36.9 °C)   Temp src Oral   SpO2 100 %   O2 Device None (Room air)       Current Vitals:   Vital Signs  BP: (!) 161/70  Pulse: 77  Resp: 20  Temp: 98.4 °F (36.9 °C)  Temp src: Oral    Oxygen Therapy  SpO2: 100 %  O2 Device: None (Room air)            Physical Exam    Constitutional: Well-developed well-nourished in no acute distress  Head: Normocephalic, no swelling or tenderness  Eyes: Nonicteric sclera, no conjunctival injection  ENT: TMs are clear and flat bilaterally.  There is no posterior pharyngeal erythema  Chest: Clear to auscultation, no tenderness  Cardiovascular: Regular rate and rhythm without murmur  Abdomen: Soft, nontender and nondistended  Neurologic: Patient is awake, alert and oriented ×3.  The patient's motor strength is 5 out of 5 and symmetric in the upper and lower extremities bilaterally  Extremities: No focal swelling or tenderness  Skin: No pallor, no redness or warmth to the touch          ED Course  Labs Reviewed - No data to display       Pulse ox is 100% on room air, normal.  Patient's blood pressure is elevated here.           Will initiate Augmentin which the patient has tolerated well in the past.  The patient had lab work done on 8/20/2024 which demonstrated normal renal function       MDM     Viral URI versus sinusitis        Medical Decision  Making      Disposition and Plan     Clinical Impression:  1. Recurrent sinusitis         Disposition:  Discharge  5/25/2025  2:23 pm    Follow-up:  Shailesh Yao, DO  [address] [facility] [address]  [phone]    In 1 week  If symptoms worsen          Medications Prescribed:  Current Discharge Medication List        START taking these medications    Details   amoxicillin clavulanate 875-125 MG Oral Tab Take 1 tablet by mouth 2 (two) times daily for 10 days.  Qty: 20 tablet, Refills: 0                   Supplementary Documentation:

## 2025-06-03 ENCOUNTER — APPOINTMENT (OUTPATIENT)
Dept: CT IMAGING | Age: 80
End: 2025-06-03
Attending: NURSE PRACTITIONER
Payer: MEDICARE

## 2025-06-03 ENCOUNTER — HOSPITAL ENCOUNTER (OUTPATIENT)
Age: 80
Discharge: HOME | End: 2025-06-03
Payer: MEDICARE

## 2025-06-03 VITALS
TEMPERATURE: 98 F | HEART RATE: 87 BPM | OXYGEN SATURATION: 94 % | DIASTOLIC BLOOD PRESSURE: 66 MMHG | SYSTOLIC BLOOD PRESSURE: 148 MMHG | RESPIRATION RATE: 20 BRPM

## 2025-06-03 DIAGNOSIS — R09.81 SINUS CONGESTION: Primary | ICD-10-CM

## 2025-06-03 PROCEDURE — 99213 OFFICE O/P EST LOW 20 MIN: CPT

## 2025-06-03 PROCEDURE — 70486 CT MAXILLOFACIAL W/O DYE: CPT | Performed by: NURSE PRACTITIONER

## 2025-06-03 PROCEDURE — 99214 OFFICE O/P EST MOD 30 MIN: CPT

## 2025-06-03 RX ORDER — FLUTICASONE PROPIONATE 50 MCG
2 SPRAY, SUSPENSION (ML) NASAL DAILY
Qty: 16 G | Refills: 0 | Status: SHIPPED | OUTPATIENT
Start: 2025-06-03 | End: 2025-07-03

## 2025-06-03 NOTE — ED INITIAL ASSESSMENT (HPI)
Was seen here on 5/25 for sinusitis, completed augmentin,states not feeling better, still with sinus congestion with clear nasal drainage, no fever, no sob

## 2025-06-03 NOTE — ED PROVIDER NOTES
He    Patient Seen in: [facility]      History     Chief Complaint   Patient presents with    Cough/URI     Stated Complaint: Sinus Long Term  Subjective:   79-year-old female with a history of hypertension, hyperlipidemia, and seasonal allergies presents for nasal and sinus congestion that has been going on for about 3 and half weeks.  She states she was seen at couple weeks ago and diagnosed with a sinus infection and prescribed a 10-day course of Augmentin.  She states she completed the course of Augmentin with little relief.  She is still having a lot of nasal and sinus congestion with some clear drainage from the nose.  No facial swelling.  She has some sinus pressure.  No coughing.  No postnasal drip.  No fevers or chills.  No headaches or dizziness.  No sore throat.  No sick contacts at home.  She states occasionally she takes Mucinex, but has not tried any other over-the-counter medications.  She is eating and drinking normally without vomiting or diarrhea.  She appears nontoxic      Objective:   Past Medical History:    Allergic rhinitis    Bronchitis    Cough due to ACE inhibitor    HTN (hypertension)    Hyperlipidemia    Insomnia    Osteopenia    Seasonal allergies    Sinusitis    Vitamin D deficiency            History reviewed. No pertinent surgical history.           Social History     Socioeconomic History    Marital status:    Tobacco Use    Smoking status: Former     Current packs/day: 0.00     Types: Cigarettes     Quit date: 1985     Years since quittin.0     Passive exposure: Never    Smokeless tobacco: Never   Vaping Use    Vaping status: Never Used   Substance and Sexual Activity    Alcohol use: Yes     Alcohol/week: 0.0 standard drinks of alcohol     Comment: occ    Drug use: No    Sexual activity: Not Currently   Other Topics Concern    Caffeine Concern Yes     Comment: rare coffee    Exercise Yes     Comment: yoga,curves,Ambrose    Social History Narrative    The  patient does not use an assistive device..      The patient does live in a home with stairs.     Social Drivers of Health     Food Insecurity: No Food Insecurity (11/14/2023)    Food Insecurity     Food Insecurity: Never true   Transportation Needs: No Transportation Needs (11/14/2023)    Transportation Needs     Lack of Transportation: No   Housing Stability: Low Risk  (11/14/2023)    Housing Stability     Housing Instability: No            Review of Systems    Positive for stated complaint: Cough/URI     Other systems are as noted in HPI.  Constitutional and vital signs reviewed.      All other systems reviewed and negative except as noted above.    Physical Exam     ED Triage Vitals [06/03/25 1321]   /66   Pulse 87   Resp 20   Temp 98.2 °F (36.8 °C)   Temp src Oral   SpO2 94 %   O2 Device None (Room air)     Current:/66   Pulse 87   Temp 98.2 °F (36.8 °C) (Oral)   Resp 20   SpO2 94%     Physical Exam  Vitals and nursing note reviewed.   Constitutional:       General: She is not in acute distress.     Appearance: Normal appearance. She is not ill-appearing or toxic-appearing.   HENT:      Head: Normocephalic.      Right Ear: Tympanic membrane normal.      Left Ear: Tympanic membrane normal.      Nose: Congestion and rhinorrhea present. Rhinorrhea is clear.      Right Sinus: Frontal sinus tenderness present. No maxillary sinus tenderness.      Left Sinus: Frontal sinus tenderness present. No maxillary sinus tenderness.      Mouth/Throat:      Lips: Pink.      Mouth: Mucous membranes are moist.      Pharynx: Oropharynx is clear. Uvula midline. No pharyngeal swelling, oropharyngeal exudate, posterior oropharyngeal erythema, uvula swelling or postnasal drip.      Tonsils: No tonsillar exudate.   Eyes:      Extraocular Movements: Extraocular movements intact.      Conjunctiva/sclera: Conjunctivae normal.      Pupils: Pupils are equal, round, and reactive to light.   Cardiovascular:      Rate and Rhythm:  Normal rate and regular rhythm.   Pulmonary:      Effort: Pulmonary effort is normal.      Breath sounds: Normal breath sounds. No wheezing, rhonchi or rales.   Musculoskeletal:         General: Normal range of motion.      Cervical back: Normal range of motion and neck supple.   Lymphadenopathy:      Cervical: No cervical adenopathy.   Skin:     General: Skin is warm and dry.      Capillary Refill: Capillary refill takes less than 2 seconds.      Findings: No rash.   Neurological:      General: No focal deficit present.      Mental Status: She is alert and oriented to person, place, and time.      Cranial Nerves: No cranial nerve deficit.      Motor: No weakness.   Psychiatric:         Mood and Affect: Mood normal.         Behavior: Behavior normal.         ED Course   CT SINUS (CPT=70486)  Result Date: 6/3/2025  CONCLUSION:  1. Diffuse nodular maxillary sinus mucosal thickening with bilateral infundibular occlusion.  2. Diffuse mucosal thickening throughout the ethmoid air cells.  3. Bilateral sphenoid sinus mucosal thickening with resultant sphenoethmoid recess occlusion.   4. Minimal frontal sinus disease.  5. Right larger than left prieto bullosa deformities.  6. Lesser incidental findings as above.    Dictated by (CST): Ricci Farley MD on 6/03/2025 at 2:16 PM     Finalized by (CST): Ricci Farley MD on 6/03/2025 at 2:20 PM          Labs Reviewed - No data to display    MDM     Medical Decision Making  The patient is aware of the CT results below.  The patient has no fever or worsening symptoms since taking the Augmentin.  She is requesting amoxicillin, we discussed that amoxicillin is in the Augmentin.  We discussed trying an over-the-counter allergy medication which she states gives her side effects and she does not like taking.  I will prescribe her Flonase to help with the nasal congestion.  She has no fevers, facial swelling, and appears well, so I do not feel another course of antibiotics is  warranted at this time.  Her congestion is most likely due to seasonal allergies.  I will refer her to ENT, but she states she would rather see her primary care provider.  She will call to arrange a close follow-up appointment.  She is aware if any worsening or concerning symptoms, to return or go to the nearest emergency department for further evaluation peer    Amount and/or Complexity of Data Reviewed  Radiology: ordered.     Details: CT SINUS (CPT=70486)        PROCEDURE: CT SINUS (CPT=70486)     COMPARISON: CT BRAIN HEAD WO CONTRAST, 4/14/2016, 1:22 PM.  [facility], CT SINUS (CPT=70486), 1/30/2018, 1:06 PM.     INDICATIONS: Sinus disease.     TECHNIQUE: CT images were obtained without intravenous contrast. Automated   exposure control for dose reduction was used. Dose information was   transmitted to the ACR (American College of Radiology) NRDR (National   Radiology Data Registry), which includes   the Dose Index Registry.       FINDINGS:   MAXILLARY SINUSES: Diffuse nodular mucosal thickening of the maxillary   sinuses is observed. The infundibula are occluded bilaterally. No   anomalous inferior orbital ethmoid (Crystal) air cells.    ETHMOID SINUSES: Scattered nodular mucosal thickening of the ethmoid air   cells is appreciated diffusely. Lamina papyracea are symmetric and intact.      SPHENOID SINUSES: Diffuse mucosal thickening is seen throughout the   sphenoid sinuses bilaterally. Sphenoethmoidal recesses are bilaterally   occluded.    FRONTAL SINUSES: Mild mucosal thickening of the frontal sinuses is noted   with mucosal thickening extending into the frontoethmoidal recesses.   Aerated secretions are seen in the right frontal sinus.    NASAL FOSSA: Trace leftward nasal septal deviation is evident. Right   larger than left prieto bullosa deformities are appreciated.    BONES: Degenerative changes of the temporomandibular joints are observed.   There is degeneration between the  anterior arch of C1 and the odontoid   process.  OTHER: Limited views of the skull base and orbits are unremarkable.                   =====  CONCLUSION:   1. Diffuse nodular maxillary sinus mucosal thickening with bilateral   infundibular occlusion.     2. Diffuse mucosal thickening throughout the ethmoid air cells.     3. Bilateral sphenoid sinus mucosal thickening with resultant   sphenoethmoid recess occlusion.       4. Minimal frontal sinus disease.     5. Right larger than left prieto bullosa deformities.     6. Lesser incidental findings as above.           Dictated by (CST): Ricci Farley MD on 6/03/2025 at 2:16 PM       Finalized by (CST): Ricci Farley MD on 6/03/2025 at 2:20 PM                 Discussion of management or test interpretation with external provider(s): I discussed this patient with Dr. Sorto. She agrees with the plan of care.     Risk  OTC drugs.  Risk Details: Nonbacterial sinusitis versus bacterial sinusitis versus allergic rhinitis        Disposition and Plan     Clinical Impression:  1. Sinus congestion         Disposition:  Discharge  6/3/2025  2:39 pm    Follow-up:  Shailesh Yao,   [address] [facility] [address] [phone]    Call   to schedule follow up appointment    Mitch Galaviz MD  [address] [address] [phone]    Call   to schedule appointment for follow up          Medications Prescribed:  Discharge Medication List as of 6/3/2025  2:42 PM        START taking these medications    Details   fluticasone propionate 50 MCG/ACT Nasal Suspension 2 sprays by Nasal route daily., Normal, Disp-16 g, R-0

## 2025-06-03 NOTE — DISCHARGE INSTRUCTIONS
Use the nasal spray as prescribed.  Make a close follow-up appointment with your primary care provider or ENT specialist referred to you.  Return for any concerns.

## 2025-07-01 ENCOUNTER — TELEPHONE (OUTPATIENT)
Dept: FAMILY MEDICINE CLINIC | Facility: CLINIC | Age: 80
End: 2025-07-01

## 2025-08-12 ENCOUNTER — MED REC SCAN ONLY (OUTPATIENT)
Dept: FAMILY MEDICINE CLINIC | Facility: CLINIC | Age: 80
End: 2025-08-12

## 2025-08-19 ENCOUNTER — OFFICE VISIT (OUTPATIENT)
Dept: FAMILY MEDICINE CLINIC | Facility: CLINIC | Age: 80
End: 2025-08-19

## 2025-08-19 VITALS
HEART RATE: 76 BPM | BODY MASS INDEX: 22.38 KG/M2 | WEIGHT: 114 LBS | SYSTOLIC BLOOD PRESSURE: 169 MMHG | HEIGHT: 60 IN | DIASTOLIC BLOOD PRESSURE: 69 MMHG | OXYGEN SATURATION: 98 %

## 2025-08-19 DIAGNOSIS — M47.816 LUMBAR SPONDYLOSIS: ICD-10-CM

## 2025-08-19 DIAGNOSIS — I10 PRIMARY HYPERTENSION: ICD-10-CM

## 2025-08-19 DIAGNOSIS — M81.0 AGE-RELATED OSTEOPOROSIS WITHOUT CURRENT PATHOLOGICAL FRACTURE: ICD-10-CM

## 2025-08-19 DIAGNOSIS — E78.00 PURE HYPERCHOLESTEROLEMIA: ICD-10-CM

## 2025-08-19 DIAGNOSIS — H25.13 AGE-RELATED NUCLEAR CATARACT OF BOTH EYES: Primary | ICD-10-CM

## 2025-08-19 DIAGNOSIS — G56.03 BILATERAL CARPAL TUNNEL SYNDROME: ICD-10-CM

## 2025-08-19 PROBLEM — M48.062 SPINAL STENOSIS OF LUMBAR REGION WITH NEUROGENIC CLAUDICATION: Status: RESOLVED | Noted: 2024-02-27 | Resolved: 2025-08-19

## 2025-08-19 PROBLEM — E43 UNSPECIFIED SEVERE PROTEIN-CALORIE MALNUTRITION (HCC): Status: RESOLVED | Noted: 2023-11-17 | Resolved: 2025-08-19

## 2025-08-19 RX ORDER — FLUTICASONE PROPIONATE 50 MCG
2 SPRAY, SUSPENSION (ML) NASAL DAILY
COMMUNITY
Start: 2025-06-03

## 2025-08-28 ENCOUNTER — LAB ENCOUNTER (OUTPATIENT)
Dept: LAB | Age: 80
End: 2025-08-28
Attending: FAMILY MEDICINE

## 2025-08-28 DIAGNOSIS — M81.0 AGE-RELATED OSTEOPOROSIS WITHOUT CURRENT PATHOLOGICAL FRACTURE: ICD-10-CM

## 2025-08-28 LAB
ALBUMIN SERPL-MCNC: 4.9 G/DL (ref 3.2–4.8)
ALBUMIN/GLOB SERPL: 1.7 (ref 1–2)
ALP LIVER SERPL-CCNC: 61 U/L (ref 55–142)
ALT SERPL-CCNC: 21 U/L (ref 10–49)
ANION GAP SERPL CALC-SCNC: 7 MMOL/L (ref 0–18)
AST SERPL-CCNC: 25 U/L (ref ?–34)
BASOPHILS # BLD AUTO: 0.11 X10(3) UL (ref 0–0.2)
BASOPHILS NFR BLD AUTO: 1.1 %
BILIRUB SERPL-MCNC: 0.8 MG/DL (ref 0.2–1.1)
BUN BLD-MCNC: 13 MG/DL (ref 9–23)
BUN/CREAT SERPL: 16.7 (ref 10–20)
CALCIUM BLD-MCNC: 9.8 MG/DL (ref 8.7–10.4)
CHLORIDE SERPL-SCNC: 104 MMOL/L (ref 98–112)
CHOLEST SERPL-MCNC: 179 MG/DL (ref ?–200)
CO2 SERPL-SCNC: 31 MMOL/L (ref 21–32)
CREAT BLD-MCNC: 0.78 MG/DL (ref 0.55–1.02)
DEPRECATED RDW RBC AUTO: 45.8 FL (ref 35.1–46.3)
EGFRCR SERPLBLD CKD-EPI 2021: 78 ML/MIN/1.73M2 (ref 60–?)
EOSINOPHIL # BLD AUTO: 0.58 X10(3) UL (ref 0–0.7)
EOSINOPHIL NFR BLD AUTO: 5.6 %
ERYTHROCYTE [DISTWIDTH] IN BLOOD BY AUTOMATED COUNT: 12.2 % (ref 11–15)
FASTING PATIENT LIPID ANSWER: YES
FASTING STATUS PATIENT QL REPORTED: YES
GLOBULIN PLAS-MCNC: 2.9 G/DL (ref 2–3.5)
GLUCOSE BLD-MCNC: 82 MG/DL (ref 70–99)
HCT VFR BLD AUTO: 36.3 % (ref 35–48)
HDLC SERPL-MCNC: 70 MG/DL (ref 40–59)
HGB BLD-MCNC: 12.1 G/DL (ref 12–16)
IMM GRANULOCYTES # BLD AUTO: 0.03 X10(3) UL (ref 0–1)
IMM GRANULOCYTES NFR BLD: 0.3 %
LDLC SERPL CALC-MCNC: 98 MG/DL (ref ?–100)
LYMPHOCYTES # BLD AUTO: 2.85 X10(3) UL (ref 1–4)
LYMPHOCYTES NFR BLD AUTO: 27.8 %
MCH RBC QN AUTO: 34.2 PG (ref 26–34)
MCHC RBC AUTO-ENTMCNC: 33.3 G/DL (ref 31–37)
MCV RBC AUTO: 102.5 FL (ref 80–100)
MONOCYTES # BLD AUTO: 0.77 X10(3) UL (ref 0.1–1)
MONOCYTES NFR BLD AUTO: 7.5 %
NEUTROPHILS # BLD AUTO: 5.93 X10 (3) UL (ref 1.5–7.7)
NEUTROPHILS # BLD AUTO: 5.93 X10(3) UL (ref 1.5–7.7)
NEUTROPHILS NFR BLD AUTO: 57.7 %
NONHDLC SERPL-MCNC: 109 MG/DL (ref ?–130)
OSMOLALITY SERPL CALC.SUM OF ELEC: 293 MOSM/KG (ref 275–295)
PLATELET # BLD AUTO: 292 10(3)UL (ref 150–450)
POTASSIUM SERPL-SCNC: 4.1 MMOL/L (ref 3.5–5.1)
PROT SERPL-MCNC: 7.8 G/DL (ref 5.7–8.2)
RBC # BLD AUTO: 3.54 X10(6)UL (ref 3.8–5.3)
SODIUM SERPL-SCNC: 142 MMOL/L (ref 136–145)
TRIGL SERPL-MCNC: 59 MG/DL (ref 30–149)
TSI SER-ACNC: 1.11 UIU/ML (ref 0.55–4.78)
VIT D+METAB SERPL-MCNC: 56.7 NG/ML (ref 30–100)
VLDLC SERPL CALC-MCNC: 10 MG/DL (ref 0–30)
WBC # BLD AUTO: 10.3 X10(3) UL (ref 4–11)

## 2025-08-28 PROCEDURE — 84443 ASSAY THYROID STIM HORMONE: CPT | Performed by: FAMILY MEDICINE

## 2025-08-28 PROCEDURE — 80061 LIPID PANEL: CPT | Performed by: FAMILY MEDICINE

## 2025-08-28 PROCEDURE — 82306 VITAMIN D 25 HYDROXY: CPT

## 2025-08-28 PROCEDURE — 85025 COMPLETE CBC W/AUTO DIFF WBC: CPT | Performed by: FAMILY MEDICINE

## 2025-08-28 PROCEDURE — 36415 COLL VENOUS BLD VENIPUNCTURE: CPT | Performed by: FAMILY MEDICINE

## 2025-08-28 PROCEDURE — 80053 COMPREHEN METABOLIC PANEL: CPT | Performed by: FAMILY MEDICINE

## (undated) DEVICE — GAMMEX® PI HYBRID SIZE 8, STERILE POWDER-FREE SURGICAL GLOVE, POLYISOPRENE AND NEOPRENE BLEND: Brand: GAMMEX

## (undated) DEVICE — OR TOWEL, 17" X 26" STERILE, BLUE: Brand: PREMIERPRO

## (undated) DEVICE — SPONGE GZ 4XL4IN 100% COT 12 PLY TYP VII WVN

## (undated) DEVICE — BANDAGE ADH W1INXL3IN NAT FAB WVN N ADH PD

## (undated) DEVICE — NEEDLE SPNL 20GA L3.5IN YEL HUB SS RW FIT

## (undated) DEVICE — INTENDED USE FOR SURGICAL MARKING ON INTACT SKIN, ALSO PROVIDES A PERMANENT METHOD OF IDENTIFYING OBJECTS IN THE OPERATING ROOM: Brand: WRITESITE® PLUS MINI PREP RESISTANT MARKER

## (undated) DEVICE — STANDARD HYPODERMIC NEEDLE,POLYPROPYLENE HUB: Brand: MONOJECT

## (undated) DEVICE — TRAY EPIDURAL PERIFIX 18GA

## (undated) NOTE — ED AVS SNAPSHOT
Tameka Gross   MRN: [id number]    Department:  [facility] Emergency Department   Date of Visit:  1/16/2018           Disclosure     Insurance plans vary and the physician(s) referred by the ER may not be covered by your plan.  Please contact within the next three months to obtain basic health screening including reassessment of your blood pressure.     IF THERE IS ANY CHANGE OR WORSENING OF YOUR CONDITION, CALL YOUR PRIMARY CARE PHYSICIAN AT ONCE OR RETURN IMMEDIATELY TO THE EMERGENCY DEPARTMEN

## (undated) NOTE — MR AVS SNAPSHOT
[facility] at [address] [place] [address] Road [address] [phone]               Thank you for choosing us for your health care visit with Maribeth Spears MD.  We are glad to serve you and happy to provid office, you can view your past visit information in [employer] by going to Visits < Visit Summaries. [employer] questions? Call [phone] for help. [employer] is NOT to be used for urgent needs. For medical emergencies, dial 911.         Educational Inform

## (undated) NOTE — LETTER
[address], [place]  Authorization for Invasive Procedures  Date: 11/9/23           Time: 1947    I hereby authorize Dr. Mariama Mariee, my physician and his/her assistants (if applicable), which may include medical students, residents, and/or fellows, to perform the following surgical operation/ procedure and administer such anesthesia as may be determined necessary by my physician: lumbar epidural steroid injection L5--S1  on [address]  2. I recognize that during the surgical operation/procedure, unforeseen conditions may necessitate additional or different procedures than those listed above. I, therefore, further authorize and request that the above-named surgeon, assistants, or designees perform such procedures as are, in their judgment, necessary and desirable. 3.   My surgeon/physician has discussed prior to my surgery the potential benefits, risks and side effects of this procedure; the likelihood of achieving goals; and potential problems that might occur during recuperation. They also discussed reasonable alternatives to the procedure, including risks, benefits, and side effects related to the alternatives and risks related to not receiving this procedure. I have had all my questions answered and I acknowledge that no guarantee has been made as to the result that may be obtained. 4.   Should the need arise during my operation/procedure, which includes change of level of care prior to discharge, I also consent to the administration of blood and/or blood products. Further, I understand that despite careful testing and screening of blood or blood products by collecting agencies, I may still be subject to ill effects as a result of receiving a blood transfusion and/or blood products.   The following are some, but not all, of the potential risks that can occur: fever and allergic reactions, hemolytic reactions, transmission of diseases such as Hepatitis, AIDS and Cytomegalovirus (CMV) and fluid overload. In the event that I wish to have an autologous transfusion of my own blood, or a directed donor transfusion, I will discuss this with my physician. Check only if Refusing Blood or Blood Products  I understand refusal of blood or blood products as deemed necessary by my physician may have serious consequences to my condition to include possible death. I hereby assume responsibility for my refusal and release the hospital, its personnel, and my physicians from any responsibility for the consequences of my refusal.         o  Refuse         5. I authorize the use of any specimen, organs, tissues, body parts or foreign objects that may be removed from my body during the operation/procedure for diagnosis, research or teaching purposes and their subsequent disposal by hospital authorities. I also authorize the release of specimen test results and/or written reports to my treating physician on the hospital medical staff or other referring or consulting physicians involved in my care, at the discretion of the Pathologist or my treating physician. 6.   I consent to the photographing or videotaping of the operations or procedures to be performed, including appropriate portions of my body for medical, scientific, or educational purposes, provided my identity is not revealed by the pictures or by descriptive texts accompanying them. If the procedure has been photographed/videotaped, the surgeon will obtain the original picture, image, videotape or CD. The hospital will not be responsible for storage, release or maintenance of the picture, image, tape or CD.    7.   I consent to the presence of a  or observers in the operating room as deemed necessary by my physician or their designees. 8.   I recognize that in the event my procedure results in extended X-Ray/fluoroscopy time, I may develop a skin reaction. 9.  If I have a Do Not Attempt Resuscitation (DNAR) order in place, that status will be suspended while in the operating room, procedural suite, and during the recovery period unless otherwise explicitly stated by me (or a person authorized to consent on my behalf). The surgeon or my attending physician will determine when the applicable recovery period ends for purposes of reinstating the DNAR order. 10. Patients having a sterilization procedure: I understand that if the procedure is successful the results will be permanent and it will therefore be impossible for me to inseminate, conceive, or bear children. I also understand that the procedure is intended to result in sterility, although the result has not been guaranteed. 11. I acknowledge that my physician has explained sedation/analgesia administration to me including the risk and benefits I consent to the administration of sedation/analgesia as may be necessary or desirable in the judgment of my physician.     I CERTIFY THAT I HAVE READ AND FULLY UNDERSTAND THE ABOVE CONSENT TO OPERATION and/or OTHER PROCEDURE.        ____________________________________       _________________________________      ______________________________  Signature of Patient         Signature of Responsible Person        Printed Name of Responsible Person        ____________________________________      _________________________________      ______________________________       Signature of Witness          Relationship to Patient                       Date                                       Time    Patient Name: Shelly Melissa     : 1945                 Printed: 2023      Medical Record #: [id number]                      Page 1 of 2          [place] My signature below affirms that prior to the time of the procedure; I have explained to the patient and/or his/her legal representative, the risks and benefits involved in the proposed treatment and any reasonable alternative to the proposed treatment. I have also explained the risks and benefits involved in refusal of the proposed treatment and alternatives to the proposed treatment and have answered the patient's questions.  If I have a significant financial interest in a co-management agreement or a significant financial interest in any product or implant, or other significant relationship used in this procedure/surgery, I have disclosed this and had a discussion with my patient.     _______________________________________________________________ _____________________________  Steven Liu  Physician)                                                                                         (Date)                                   (Time)    Patient Name: Murray Koch     : 1945                 Printed: 2023      Medical Record #: [id number]                      Page 2 of 2

## (undated) NOTE — LETTER
[address]   Date:   5/4/2021     Name:   David Corey    YOB: 1945   MRN:   [id number]       WHERE IS YOUR PAIN NOW? Bartolo the areas on your body where you feel the described sensations.   Use the appropriate sy

## (undated) NOTE — LETTER
[address] [place] [address]  [address]  Phone: [phone]  Fax: [phone]       Hello,     This is the [facility], office of Dr. Tushar Yao.    Thank you for putting your trust in [facility].  Our goal is to deliver the highest quality healthcare and an exceptional patient experience. Review of your medical record shows you are due for the following:         Annual Medicare Physical           Please call [phone] to schedule your appointment or schedule online via [employer].     If you changed to a new provider at another facility, please notify the clinic to update your records.    If you had any recent testing at another facility, please have your results faxed to our office at [phone].     Thank you and have a great day!

## (undated) NOTE — LETTER
[address], [place]  Authorization for Invasive Procedures  Date: 11/10/2023           Time: 65  I hereby authorize Dr Jasbir Tamez, my physician and his/her assistants (if applicable), which may include medical students, residents, and/or fellows, to perform the following surgical operation/ procedure and administer such anesthesia as may be determined necessary by my physician: Lumbar Epidural Steroid Injection L5-S1 on [address]  2. I recognize that during the surgical operation/procedure, unforeseen conditions may necessitate additional or different procedures than those listed above. I, therefore, further authorize and request that the above-named surgeon, assistants, or designees perform such procedures as are, in their judgment, necessary and desirable. 3.   My surgeon/physician has discussed prior to my surgery the potential benefits, risks and side effects of this procedure; the likelihood of achieving goals; and potential problems that might occur during recuperation. They also discussed reasonable alternatives to the procedure, including risks, benefits, and side effects related to the alternatives and risks related to not receiving this procedure. I have had all my questions answered and I acknowledge that no guarantee has been made as to the result that may be obtained. 4.   Should the need arise during my operation/procedure, which includes change of level of care prior to discharge, I also consent to the administration of blood and/or blood products. Further, I understand that despite careful testing and screening of blood or blood products by collecting agencies, I may still be subject to ill effects as a result of receiving a blood transfusion and/or blood products.   The following are some, but not all, of the potential risks that can occur: fever and allergic reactions, hemolytic reactions, transmission of diseases such as Hepatitis, AIDS and Cytomegalovirus (CMV) and fluid overload. In the event that I wish to have an autologous transfusion of my own blood, or a directed donor transfusion, I will discuss this with my physician. Check only if Refusing Blood or Blood Products  I understand refusal of blood or blood products as deemed necessary by my physician may have serious consequences to my condition to include possible death. I hereby assume responsibility for my refusal and release the hospital, its personnel, and my physicians from any responsibility for the consequences of my refusal.         o  Refuse         5. I authorize the use of any specimen, organs, tissues, body parts or foreign objects that may be removed from my body during the operation/procedure for diagnosis, research or teaching purposes and their subsequent disposal by hospital authorities. I also authorize the release of specimen test results and/or written reports to my treating physician on the hospital medical staff or other referring or consulting physicians involved in my care, at the discretion of the Pathologist or my treating physician. 6.   I consent to the photographing or videotaping of the operations or procedures to be performed, including appropriate portions of my body for medical, scientific, or educational purposes, provided my identity is not revealed by the pictures or by descriptive texts accompanying them. If the procedure has been photographed/videotaped, the surgeon will obtain the original picture, image, videotape or CD. The hospital will not be responsible for storage, release or maintenance of the picture, image, tape or CD.    7.   I consent to the presence of a  or observers in the operating room as deemed necessary by my physician or their designees. 8.   I recognize that in the event my procedure results in extended X-Ray/fluoroscopy time, I may develop a skin reaction. 9.  If I have a Do Not Attempt Resuscitation (DNAR) order in place, that status will be suspended while in the operating room, procedural suite, and during the recovery period unless otherwise explicitly stated by me (or a person authorized to consent on my behalf). The surgeon or my attending physician will determine when the applicable recovery period ends for purposes of reinstating the DNAR order. 10. Patients having a sterilization procedure: I understand that if the procedure is successful the results will be permanent and it will therefore be impossible for me to inseminate, conceive, or bear children. I also understand that the procedure is intended to result in sterility, although the result has not been guaranteed. 11. I acknowledge that my physician has explained sedation/analgesia administration to me including the risk and benefits I consent to the administration of sedation/analgesia as may be necessary or desirable in the judgment of my physician. I CERTIFY THAT I HAVE READ AND FULLY UNDERSTAND THE ABOVE CONSENT TO OPERATION and/or OTHER PROCEDURE.        ____________________________________       _________________________________      ______________________________  Signature of Patient         Signature of Responsible Person        Printed Name of Responsible Person        ____________________________________      _________________________________      ______________________________       Signature of Witness          Relationship to Patient                       Date                                       Time    Patient Name: Stanislaw Rodriguez     : 1945                 Printed: November 10, 2023      Medical Record #: [id number]                      Page 1 of 2          [place] My signature below affirms that prior to the time of the procedure; I have explained to the patient and/or his/her legal representative, the risks and benefits involved in the proposed treatment and any reasonable alternative to the proposed treatment.  I have also explained the risks and benefits involved in refusal of the proposed treatment and alternatives to the proposed treatment and have answered the patient's questions.  If I have a significant financial interest in a co-management agreement or a significant financial interest in any product or implant, or other significant relationship used in this procedure/surgery, I have disclosed this and had a discussion with my patient.     _______________________________________________________________ _____________________________  Lola Juarez of Physician)                                                                                         (Date)                                   (Time)    Patient Name: Rashad Small     : 1945                 Printed: November 10, 2023      Medical Record #: [id number]                      Page 2 of 2

## (undated) NOTE — MR AVS SNAPSHOT
[address] at [address] [id number] [phone]               Thank you for choosing us for your health care visit with Liam Hernandez MD.  We are glad to serve you and happy to provide you with [facility] Where to Get Your Medications      These medications were sent to [facility] [address], [address] [phone], [phone]  [address] [place], [place] [address]     Phone:  [phone]    - Montelukast Sodium 10 MG Tabs

## (undated) NOTE — MR AVS SNAPSHOT
[address] at [address] [address]  [phone]               Thank you for choosing us for your health care visit with [place] [address] NURSE.   We are glad to serve you and happy to provide you with this summ

## (undated) NOTE — LETTER
Hospital Discharge Documentation  Please phone to schedule a hospital follow up appointment. From: [address] Hospitalist's Office  Phone: [phone]    Patient discharged time/date: 2023  1:47 PM  Patient discharge disposition:  Home or Self Care       Discharge Summary - D/C Summary        Discharge Summary signed by Paul Puckett MD at 2023 12:02 PM  Version 1 of 1      Author: Paul Puckett MD Service: Internal Medicine Author Type: Physician    Filed: 2023 12:02 PM Date of Service: 2023 11:56 AM Status: Signed    : Paul Puckett MD (Physician)         [facility]    Discharge Summary    Gayle Hubbard Patient Status:  Inpatient    1945 MRN [id number]   Location [facility] 5SW/SE Attending Paul Puckett MD   Hosp Day # 4 PCP Summer Obregon MD     Date of Admission: 2023   Date of Discharge: 2023    Hospital Discharge Diagnoses: Acute on chronic Back Pain    Lace+ Score: 54  59-90 High Risk  29-58 Medium Risk  0-28   Low Risk. TCM Follow-Up Recommendation:  LACE > 58: High Risk of readmission after discharge from the hospital.        Admitting Diagnosis: Back pain with radiation [M54.9]    Disposition: Home    Discharge Diagnosis: . Principal Problem:    Back pain with radiation      Hospital Course:   Reason for Admission:     Discharge Physical Exam:   Physical Exam:    General: No acute distress. Respiratory: Clear to auscultation bilaterally. No wheezes. No rhonchi. Cardiovascular: S1, S2. Regular rate and rhythm. No murmurs, rubs or gallops. Abdomen: Soft, nontender, nondistended. Positive bowel sounds. No rebound or guarding. Neurologic: No focal neurological deficits. Musculoskeletal: Moves all extremities.     Hospital Course:     Back pain with radiation     Spinal Stenosis     Chronic Lumbar Pain   - pain control with IV  pain meds and lidocaine patch   - MRI of the lumbar spine reviewed  - CT of abdomen and pelvis reveals multiple sacral fractures   - continue norco 5/325mg q 4 hours prn for pain- script printed by pain medicine specialist   - tylenol 650 mg q 6 hours scheduled  - ketoralac ordered by Pain as well   - lidocaine patch  - status post steroid injection 11/10 at L5-S1 with local anesthesia   - Pain service consult appreciated  - check vitamin D level- WNL  - IV solumedrol - stop at disharge   - PT/OT after steroid injection- home with home PT     Leukocytosis  - mostly reactive and secondary to steroids   - resolved     HTN  Continue home med            Quality:  DVT Prophylaxis: SCDs  CODE status: Full    Complications: none      Surgical Procedures       Case IDs Date Procedure Surgeon Location Status    [id number] 11/10/23 LUMBAR EPIDURAL STEROID INJECTION Beata Franco  [facility]              Discharge Plan:   Discharge Condition: Stable    Current Discharge Medication List        New Orders    Details   lidocaine-menthol 4-1 % External Patch Place 1 patch onto the skin daily. HYDROcodone-acetaminophen 5-325 MG Oral Tab Take 1 tablet by mouth every 6 (six) hours as needed. Ketorolac Tromethamine 10 MG Oral Tab Take 1 tablet (10 mg total) by mouth every 6 (six) hours as needed. Home Meds - Unchanged    Details   gabapentin 300 MG Oral Cap Take 1 capsule (300 mg total) by mouth 3 (three) times daily. amLODIPine 2.5 MG Oral Tab Take 1 tablet (2.5 mg total) by mouth daily. triamcinolone 0.1 % External Cream Apply 1 Application topically 2 (two) times daily as needed. aspirin 81 MG Oral Tab Take 1 tablet (81 mg total) by mouth daily. Discharge Diet: General diet healthy    Discharge Activity: As tolerated       Discharge Medications        START taking these medications        Instructions Prescription details   HYDROcodone-acetaminophen 5-325 MG Tabs  Commonly known as: Norco      Take 1 tablet by mouth every 6 (six) hours as needed.    Stop taking on: November 15, 2023  Quantity: 10 tablet  Refills: 0     Ketorolac Tromethamine 10 MG Tabs  Commonly known as: TORADOL      Take 1 tablet (10 mg total) by mouth every 6 (six) hours as needed. Stop taking on: November 15, 2023  Quantity: 20 tablet  Refills: 0     lidocaine-menthol 4-1 % Ptch  Start taking on: November 13, 2023      Place 1 patch onto the skin daily. Quantity: 30 patch  Refills: 0            CONTINUE taking these medications        Instructions Prescription details   amLODIPine 2.5 MG Tabs  Commonly known as: Norvasc      Take 1 tablet (2.5 mg total) by mouth daily. Quantity: 30 tablet  Refills: 11     aspirin 81 MG Tabs      Take 1 tablet (81 mg total) by mouth daily. Refills: 0     gabapentin 300 MG Caps  Commonly known as: Neurontin      Take 1 capsule (300 mg total) by mouth 3 (three) times daily. Quantity: 90 capsule  Refills: 2     triamcinolone 0.1 % Crea  Commonly known as: Kenalog      Apply 1 Application topically 2 (two) times daily as needed. Quantity: 60 g  Refills: 0            STOP taking these medications      lidocaine 5 % Ptch  Commonly known as: Lidoderm        methylPREDNISolone 4 MG Tbpk  Commonly known as: Medrol                  Where to Get Your Medications        These medications were sent to [facility], [address] [address] [phone], [address] [place] [address]      Phone: [phone]   HYDROcodone-acetaminophen 5-325 MG Tabs  Ketorolac Tromethamine 10 MG Tabs  lidocaine-menthol 4-1 % Ptch         Follow up: Follow-up Information       Paz Camargo DO Follow up. Specialties: Physical Medicine, PHYSIATRY  Contact information:  [address] [place] [address] Road  [phone]                             Follow up Labs and imaging:          Other Discharge Instructions:         [address]  [address], [address]  Phone: [phone]  Fax: [phone]         Time spent:  > 30 minutes    Jez Mead MD  11/12/2023      Electronically signed by Trinity Marcano MD on 11/12/2023 12:02 PM

## (undated) NOTE — Clinical Note
MARYELLENI. Patient completed TCM. Patient does not have HFU appt scheduled at this time. NCM attempted to schedule TCM/HFU however, no availability within the timeframe recommended. TE to PCP office re: appointment. Thank you.

## (undated) NOTE — LETTER
[address]   Date:   10/2/2019     Name:   Ankita     YOB: 1945   MRN:   [id number]       WHERE IS YOUR PAIN NOW? Bartolo the areas on your body where you feel the described sensations.   Use the appropriate s

## (undated) NOTE — MR AVS SNAPSHOT
[address] at [address] [facility] [id number] [phone]               Thank you for choosing us for your health care visit with Black Agustin DO.   We are glad to serve you and happy to provide you with this bella Summaries. If you've been to the Emergency Department or your doctor's office, you can view your past visit information in [employer] by going to Visits < Visit Summaries. [employer] questions? Call [phone] for help.   [employer] is NOT to be used for urge